# Patient Record
Sex: MALE | Race: WHITE | Employment: OTHER | URBAN - METROPOLITAN AREA
[De-identification: names, ages, dates, MRNs, and addresses within clinical notes are randomized per-mention and may not be internally consistent; named-entity substitution may affect disease eponyms.]

---

## 2017-03-21 ENCOUNTER — OFFICE VISIT (OUTPATIENT)
Dept: INTERNAL MEDICINE CLINIC | Age: 72
End: 2017-03-21

## 2017-03-21 VITALS
SYSTOLIC BLOOD PRESSURE: 144 MMHG | HEIGHT: 69 IN | BODY MASS INDEX: 28.58 KG/M2 | OXYGEN SATURATION: 98 % | DIASTOLIC BLOOD PRESSURE: 82 MMHG | HEART RATE: 63 BPM | WEIGHT: 193 LBS

## 2017-03-21 DIAGNOSIS — E07.9 THYROID DISORDER: ICD-10-CM

## 2017-03-21 DIAGNOSIS — E78.00 HYPERCHOLESTEROLEMIA: ICD-10-CM

## 2017-03-21 DIAGNOSIS — I10 ESSENTIAL HYPERTENSION: Primary | ICD-10-CM

## 2017-03-21 DIAGNOSIS — R73.9 HYPERGLYCEMIA: ICD-10-CM

## 2017-03-21 LAB
A/G RATIO: 2 (ref 1.1–2.2)
ALBUMIN SERPL-MCNC: 4.9 G/DL (ref 3.4–5)
ALP BLD-CCNC: 68 U/L (ref 40–129)
ALT SERPL-CCNC: 23 U/L (ref 10–40)
ANION GAP SERPL CALCULATED.3IONS-SCNC: 15 MMOL/L (ref 3–16)
AST SERPL-CCNC: 21 U/L (ref 15–37)
BILIRUB SERPL-MCNC: 0.4 MG/DL (ref 0–1)
BUN BLDV-MCNC: 18 MG/DL (ref 7–20)
CALCIUM SERPL-MCNC: 10 MG/DL (ref 8.3–10.6)
CHLORIDE BLD-SCNC: 101 MMOL/L (ref 99–110)
CHOLESTEROL, TOTAL: 165 MG/DL (ref 0–199)
CO2: 23 MMOL/L (ref 21–32)
CREAT SERPL-MCNC: 0.9 MG/DL (ref 0.8–1.3)
GFR AFRICAN AMERICAN: >60
GFR NON-AFRICAN AMERICAN: >60
GLOBULIN: 2.4 G/DL
GLUCOSE BLD-MCNC: 106 MG/DL (ref 70–99)
POTASSIUM SERPL-SCNC: 4.7 MMOL/L (ref 3.5–5.1)
SODIUM BLD-SCNC: 139 MMOL/L (ref 136–145)
TOTAL PROTEIN: 7.3 G/DL (ref 6.4–8.2)
TSH REFLEX: 3.46 UIU/ML (ref 0.27–4.2)

## 2017-03-21 PROCEDURE — 99213 OFFICE O/P EST LOW 20 MIN: CPT | Performed by: INTERNAL MEDICINE

## 2017-03-21 PROCEDURE — 3017F COLORECTAL CA SCREEN DOC REV: CPT | Performed by: INTERNAL MEDICINE

## 2017-03-21 PROCEDURE — G8420 CALC BMI NORM PARAMETERS: HCPCS | Performed by: INTERNAL MEDICINE

## 2017-03-21 PROCEDURE — 1123F ACP DISCUSS/DSCN MKR DOCD: CPT | Performed by: INTERNAL MEDICINE

## 2017-03-21 PROCEDURE — G8484 FLU IMMUNIZE NO ADMIN: HCPCS | Performed by: INTERNAL MEDICINE

## 2017-03-21 PROCEDURE — 4040F PNEUMOC VAC/ADMIN/RCVD: CPT | Performed by: INTERNAL MEDICINE

## 2017-03-21 PROCEDURE — G8427 DOCREV CUR MEDS BY ELIG CLIN: HCPCS | Performed by: INTERNAL MEDICINE

## 2017-03-21 PROCEDURE — 4004F PT TOBACCO SCREEN RCVD TLK: CPT | Performed by: INTERNAL MEDICINE

## 2017-03-21 ASSESSMENT — ENCOUNTER SYMPTOMS
ALLERGIC/IMMUNOLOGIC NEGATIVE: 1
EYES NEGATIVE: 1
RESPIRATORY NEGATIVE: 1

## 2017-03-22 LAB
ESTIMATED AVERAGE GLUCOSE: 125.5 MG/DL
HBA1C MFR BLD: 6 %

## 2017-04-17 RX ORDER — AMLODIPINE BESYLATE 10 MG/1
TABLET ORAL
Qty: 90 TABLET | Refills: 3 | Status: SHIPPED | OUTPATIENT
Start: 2017-04-17 | End: 2017-11-02 | Stop reason: SDUPTHER

## 2017-04-17 RX ORDER — SIMVASTATIN 20 MG
TABLET ORAL
Qty: 90 TABLET | Refills: 3 | Status: SHIPPED | OUTPATIENT
Start: 2017-04-17 | End: 2018-04-23 | Stop reason: SDUPTHER

## 2017-09-26 ENCOUNTER — OFFICE VISIT (OUTPATIENT)
Dept: INTERNAL MEDICINE CLINIC | Age: 72
End: 2017-09-26

## 2017-09-26 VITALS
SYSTOLIC BLOOD PRESSURE: 142 MMHG | WEIGHT: 193.6 LBS | HEIGHT: 69 IN | DIASTOLIC BLOOD PRESSURE: 72 MMHG | HEART RATE: 74 BPM | OXYGEN SATURATION: 98 % | BODY MASS INDEX: 28.68 KG/M2

## 2017-09-26 DIAGNOSIS — Z23 NEEDS FLU SHOT: ICD-10-CM

## 2017-09-26 DIAGNOSIS — E07.9 THYROID DISORDER: ICD-10-CM

## 2017-09-26 DIAGNOSIS — R73.9 HYPERGLYCEMIA: ICD-10-CM

## 2017-09-26 DIAGNOSIS — F32.A DEPRESSION, UNSPECIFIED DEPRESSION TYPE: ICD-10-CM

## 2017-09-26 DIAGNOSIS — E78.00 HYPERCHOLESTEROLEMIA: ICD-10-CM

## 2017-09-26 DIAGNOSIS — Z12.2 ENCOUNTER FOR SCREENING FOR LUNG CANCER: ICD-10-CM

## 2017-09-26 DIAGNOSIS — I10 ESSENTIAL HYPERTENSION: Primary | ICD-10-CM

## 2017-09-26 LAB
A/G RATIO: 2 (ref 1.1–2.2)
ALBUMIN SERPL-MCNC: 4.7 G/DL (ref 3.4–5)
ALP BLD-CCNC: 65 U/L (ref 40–129)
ALT SERPL-CCNC: 18 U/L (ref 10–40)
ANION GAP SERPL CALCULATED.3IONS-SCNC: 17 MMOL/L (ref 3–16)
AST SERPL-CCNC: 19 U/L (ref 15–37)
BILIRUB SERPL-MCNC: 0.4 MG/DL (ref 0–1)
BUN BLDV-MCNC: 16 MG/DL (ref 7–20)
CALCIUM SERPL-MCNC: 9.7 MG/DL (ref 8.3–10.6)
CHLORIDE BLD-SCNC: 99 MMOL/L (ref 99–110)
CHOLESTEROL, TOTAL: 163 MG/DL (ref 0–199)
CO2: 22 MMOL/L (ref 21–32)
CREAT SERPL-MCNC: 0.9 MG/DL (ref 0.8–1.3)
GFR AFRICAN AMERICAN: >60
GFR NON-AFRICAN AMERICAN: >60
GLOBULIN: 2.4 G/DL
GLUCOSE BLD-MCNC: 113 MG/DL (ref 70–99)
HDLC SERPL-MCNC: 44 MG/DL (ref 40–60)
LDL CHOLESTEROL CALCULATED: 83 MG/DL
POTASSIUM SERPL-SCNC: 4.9 MMOL/L (ref 3.5–5.1)
SODIUM BLD-SCNC: 138 MMOL/L (ref 136–145)
TOTAL PROTEIN: 7.1 G/DL (ref 6.4–8.2)
TRIGL SERPL-MCNC: 178 MG/DL (ref 0–150)
VLDLC SERPL CALC-MCNC: 36 MG/DL

## 2017-09-26 PROCEDURE — 4040F PNEUMOC VAC/ADMIN/RCVD: CPT | Performed by: INTERNAL MEDICINE

## 2017-09-26 PROCEDURE — 3017F COLORECTAL CA SCREEN DOC REV: CPT | Performed by: INTERNAL MEDICINE

## 2017-09-26 PROCEDURE — 99214 OFFICE O/P EST MOD 30 MIN: CPT | Performed by: INTERNAL MEDICINE

## 2017-09-26 PROCEDURE — 1123F ACP DISCUSS/DSCN MKR DOCD: CPT | Performed by: INTERNAL MEDICINE

## 2017-09-26 PROCEDURE — 4004F PT TOBACCO SCREEN RCVD TLK: CPT | Performed by: INTERNAL MEDICINE

## 2017-09-26 PROCEDURE — G8427 DOCREV CUR MEDS BY ELIG CLIN: HCPCS | Performed by: INTERNAL MEDICINE

## 2017-09-26 PROCEDURE — G8419 CALC BMI OUT NRM PARAM NOF/U: HCPCS | Performed by: INTERNAL MEDICINE

## 2017-09-26 PROCEDURE — 90662 IIV NO PRSV INCREASED AG IM: CPT | Performed by: INTERNAL MEDICINE

## 2017-09-26 PROCEDURE — G0008 ADMIN INFLUENZA VIRUS VAC: HCPCS | Performed by: INTERNAL MEDICINE

## 2017-09-26 RX ORDER — MIRTAZAPINE 45 MG/1
45 TABLET, FILM COATED ORAL NIGHTLY
COMMUNITY

## 2017-09-26 ASSESSMENT — ENCOUNTER SYMPTOMS
RESPIRATORY NEGATIVE: 1
EYES NEGATIVE: 1

## 2017-09-27 ENCOUNTER — TELEPHONE (OUTPATIENT)
Dept: INTERNAL MEDICINE CLINIC | Age: 72
End: 2017-09-27

## 2017-09-27 LAB
ESTIMATED AVERAGE GLUCOSE: 128.4 MG/DL
HBA1C MFR BLD: 6.1 %

## 2017-10-02 ENCOUNTER — TELEPHONE (OUTPATIENT)
Dept: INTERNAL MEDICINE CLINIC | Age: 72
End: 2017-10-02

## 2017-10-31 ENCOUNTER — TELEPHONE (OUTPATIENT)
Dept: INTERNAL MEDICINE CLINIC | Age: 72
End: 2017-10-31

## 2017-10-31 ENCOUNTER — HOSPITAL ENCOUNTER (OUTPATIENT)
Dept: CT IMAGING | Age: 72
Discharge: HOME | End: 2017-10-31
Admitting: INTERNAL MEDICINE

## 2017-10-31 DIAGNOSIS — Z12.2 ENCOUNTER FOR SCREENING FOR LUNG CANCER: ICD-10-CM

## 2017-10-31 DIAGNOSIS — Z12.2 ENCOUNTER FOR SCREENING FOR LUNG CANCER: Primary | ICD-10-CM

## 2017-10-31 DIAGNOSIS — Z12.2 ENCOUNTER FOR SCREENING FOR MALIGNANT NEOPLASM OF RESPIRATORY ORGANS: ICD-10-CM

## 2017-11-02 RX ORDER — LISINOPRIL 20 MG/1
TABLET ORAL
Qty: 90 TABLET | Refills: 3 | Status: SHIPPED | OUTPATIENT
Start: 2017-11-02 | End: 2018-09-20 | Stop reason: SDUPTHER

## 2017-11-02 RX ORDER — AMLODIPINE BESYLATE 10 MG/1
TABLET ORAL
Qty: 90 TABLET | Refills: 3 | Status: SHIPPED | OUTPATIENT
Start: 2017-11-02 | End: 2017-11-06 | Stop reason: SDUPTHER

## 2017-11-06 RX ORDER — AMLODIPINE BESYLATE 10 MG/1
TABLET ORAL
Qty: 90 TABLET | Refills: 3 | Status: SHIPPED | OUTPATIENT
Start: 2017-11-06 | End: 2017-11-20 | Stop reason: SDUPTHER

## 2017-11-09 ENCOUNTER — TELEPHONE (OUTPATIENT)
Dept: INTERNAL MEDICINE CLINIC | Age: 72
End: 2017-11-09

## 2017-11-09 DIAGNOSIS — I10 ESSENTIAL HYPERTENSION: Primary | ICD-10-CM

## 2017-11-09 RX ORDER — AMLODIPINE BESYLATE 10 MG/1
10 TABLET ORAL DAILY
Qty: 20 TABLET | Refills: 0 | Status: SHIPPED | OUTPATIENT
Start: 2017-11-09 | End: 2018-03-27 | Stop reason: CLARIF

## 2017-11-09 RX ORDER — AMLODIPINE BESYLATE 10 MG/1
TABLET ORAL
Qty: 90 TABLET | Refills: 3 | Status: CANCELLED | OUTPATIENT
Start: 2017-11-09

## 2017-11-20 RX ORDER — AMLODIPINE BESYLATE 10 MG/1
TABLET ORAL
Qty: 90 TABLET | Refills: 3 | Status: SHIPPED | OUTPATIENT
Start: 2017-11-20 | End: 2019-02-11 | Stop reason: SDUPTHER

## 2018-01-03 RX ORDER — LEVOTHYROXINE SODIUM 0.07 MG/1
TABLET ORAL
Qty: 90 TABLET | Refills: 3 | Status: SHIPPED | OUTPATIENT
Start: 2018-01-03 | End: 2018-11-15 | Stop reason: CLARIF

## 2018-03-27 ENCOUNTER — OFFICE VISIT (OUTPATIENT)
Dept: INTERNAL MEDICINE CLINIC | Age: 73
End: 2018-03-27

## 2018-03-27 VITALS
WEIGHT: 196 LBS | DIASTOLIC BLOOD PRESSURE: 82 MMHG | HEART RATE: 63 BPM | SYSTOLIC BLOOD PRESSURE: 138 MMHG | HEIGHT: 69 IN | BODY MASS INDEX: 29.03 KG/M2 | OXYGEN SATURATION: 97 %

## 2018-03-27 DIAGNOSIS — E78.00 HYPERCHOLESTEROLEMIA: ICD-10-CM

## 2018-03-27 DIAGNOSIS — R73.9 HYPERGLYCEMIA: ICD-10-CM

## 2018-03-27 DIAGNOSIS — J43.9 PULMONARY EMPHYSEMA, UNSPECIFIED EMPHYSEMA TYPE (HCC): ICD-10-CM

## 2018-03-27 DIAGNOSIS — E07.9 THYROID DISORDER: ICD-10-CM

## 2018-03-27 DIAGNOSIS — I10 ESSENTIAL HYPERTENSION: Primary | ICD-10-CM

## 2018-03-27 LAB
A/G RATIO: 2.1 (ref 1.1–2.2)
ALBUMIN SERPL-MCNC: 4.6 G/DL (ref 3.4–5)
ALP BLD-CCNC: 60 U/L (ref 40–129)
ALT SERPL-CCNC: 22 U/L (ref 10–40)
ANION GAP SERPL CALCULATED.3IONS-SCNC: 14 MMOL/L (ref 3–16)
AST SERPL-CCNC: 21 U/L (ref 15–37)
BILIRUB SERPL-MCNC: 0.3 MG/DL (ref 0–1)
BUN BLDV-MCNC: 21 MG/DL (ref 7–20)
CALCIUM SERPL-MCNC: 9.1 MG/DL (ref 8.3–10.6)
CHLORIDE BLD-SCNC: 102 MMOL/L (ref 99–110)
CHOLESTEROL, TOTAL: 150 MG/DL (ref 0–199)
CO2: 23 MMOL/L (ref 21–32)
CREAT SERPL-MCNC: 0.9 MG/DL (ref 0.8–1.3)
GFR AFRICAN AMERICAN: >60
GFR NON-AFRICAN AMERICAN: >60
GLOBULIN: 2.2 G/DL
GLUCOSE BLD-MCNC: 107 MG/DL (ref 70–99)
POTASSIUM SERPL-SCNC: 4.7 MMOL/L (ref 3.5–5.1)
SODIUM BLD-SCNC: 139 MMOL/L (ref 136–145)
TOTAL PROTEIN: 6.8 G/DL (ref 6.4–8.2)
TSH REFLEX: 3.43 UIU/ML (ref 0.27–4.2)

## 2018-03-27 PROCEDURE — 3017F COLORECTAL CA SCREEN DOC REV: CPT | Performed by: INTERNAL MEDICINE

## 2018-03-27 PROCEDURE — 99214 OFFICE O/P EST MOD 30 MIN: CPT | Performed by: INTERNAL MEDICINE

## 2018-03-27 PROCEDURE — G8482 FLU IMMUNIZE ORDER/ADMIN: HCPCS | Performed by: INTERNAL MEDICINE

## 2018-03-27 PROCEDURE — 4040F PNEUMOC VAC/ADMIN/RCVD: CPT | Performed by: INTERNAL MEDICINE

## 2018-03-27 PROCEDURE — 3023F SPIROM DOC REV: CPT | Performed by: INTERNAL MEDICINE

## 2018-03-27 PROCEDURE — 1123F ACP DISCUSS/DSCN MKR DOCD: CPT | Performed by: INTERNAL MEDICINE

## 2018-03-27 PROCEDURE — G8427 DOCREV CUR MEDS BY ELIG CLIN: HCPCS | Performed by: INTERNAL MEDICINE

## 2018-03-27 PROCEDURE — G8926 SPIRO NO PERF OR DOC: HCPCS | Performed by: INTERNAL MEDICINE

## 2018-03-27 PROCEDURE — 4004F PT TOBACCO SCREEN RCVD TLK: CPT | Performed by: INTERNAL MEDICINE

## 2018-03-27 PROCEDURE — G8419 CALC BMI OUT NRM PARAM NOF/U: HCPCS | Performed by: INTERNAL MEDICINE

## 2018-03-27 ASSESSMENT — PATIENT HEALTH QUESTIONNAIRE - PHQ9
SUM OF ALL RESPONSES TO PHQ QUESTIONS 1-9: 0
SUM OF ALL RESPONSES TO PHQ9 QUESTIONS 1 & 2: 0
2. FEELING DOWN, DEPRESSED OR HOPELESS: 0
1. LITTLE INTEREST OR PLEASURE IN DOING THINGS: 0

## 2018-03-27 ASSESSMENT — ENCOUNTER SYMPTOMS
RESPIRATORY NEGATIVE: 1
ALLERGIC/IMMUNOLOGIC NEGATIVE: 1
GASTROINTESTINAL NEGATIVE: 1
EYES NEGATIVE: 1

## 2018-03-28 ENCOUNTER — TELEPHONE (OUTPATIENT)
Dept: INTERNAL MEDICINE CLINIC | Age: 73
End: 2018-03-28

## 2018-03-28 LAB
ESTIMATED AVERAGE GLUCOSE: 125.5 MG/DL
HBA1C MFR BLD: 6 %

## 2018-04-23 RX ORDER — SIMVASTATIN 20 MG
TABLET ORAL
Qty: 90 TABLET | Refills: 3 | Status: SHIPPED | OUTPATIENT
Start: 2018-04-23 | End: 2018-04-26 | Stop reason: SDUPTHER

## 2018-04-26 RX ORDER — SIMVASTATIN 20 MG
TABLET ORAL
Qty: 90 TABLET | Refills: 3 | Status: SHIPPED | OUTPATIENT
Start: 2018-04-26 | End: 2019-04-29 | Stop reason: SDUPTHER

## 2018-06-15 ENCOUNTER — OFFICE VISIT (OUTPATIENT)
Dept: SURGERY | Age: 73
End: 2018-06-15

## 2018-06-15 VITALS
WEIGHT: 193 LBS | HEART RATE: 58 BPM | DIASTOLIC BLOOD PRESSURE: 85 MMHG | SYSTOLIC BLOOD PRESSURE: 155 MMHG | BODY MASS INDEX: 28.5 KG/M2

## 2018-06-15 DIAGNOSIS — L08.9 INFECTED SEBACEOUS CYST: Primary | ICD-10-CM

## 2018-06-15 DIAGNOSIS — L72.3 INFECTED SEBACEOUS CYST: Primary | ICD-10-CM

## 2018-06-15 PROCEDURE — 4004F PT TOBACCO SCREEN RCVD TLK: CPT | Performed by: SURGERY

## 2018-06-15 PROCEDURE — G8419 CALC BMI OUT NRM PARAM NOF/U: HCPCS | Performed by: SURGERY

## 2018-06-15 PROCEDURE — 99203 OFFICE O/P NEW LOW 30 MIN: CPT | Performed by: SURGERY

## 2018-06-15 PROCEDURE — 1123F ACP DISCUSS/DSCN MKR DOCD: CPT | Performed by: SURGERY

## 2018-06-15 PROCEDURE — 4040F PNEUMOC VAC/ADMIN/RCVD: CPT | Performed by: SURGERY

## 2018-06-15 PROCEDURE — 3017F COLORECTAL CA SCREEN DOC REV: CPT | Performed by: SURGERY

## 2018-06-15 PROCEDURE — G8427 DOCREV CUR MEDS BY ELIG CLIN: HCPCS | Performed by: SURGERY

## 2018-07-06 ENCOUNTER — PROCEDURE VISIT (OUTPATIENT)
Dept: SURGERY | Age: 73
End: 2018-07-06

## 2018-07-06 VITALS
HEART RATE: 61 BPM | BODY MASS INDEX: 28.5 KG/M2 | SYSTOLIC BLOOD PRESSURE: 150 MMHG | WEIGHT: 193 LBS | DIASTOLIC BLOOD PRESSURE: 87 MMHG

## 2018-07-06 DIAGNOSIS — L08.9 INFECTED SEBACEOUS CYST: Primary | ICD-10-CM

## 2018-07-06 DIAGNOSIS — L72.3 INFECTED SEBACEOUS CYST: Primary | ICD-10-CM

## 2018-07-06 PROCEDURE — 11402 EXC TR-EXT B9+MARG 1.1-2 CM: CPT | Performed by: SURGERY

## 2018-07-06 NOTE — PATIENT INSTRUCTIONS
OK to shower overtop of water tight dressing. OK to remove outer dressings in 48 hours. Leave steristrips in place. (These will fall of over time)   OK to take naproxen for pain. Call for any worsening redness, pain, or fevers greater than 101.5. No restrictions, activities as tolerated. OK to use ICE if needed. Will decrease inflammation. Follow up in 2 weeks to remove sutures.

## 2018-07-20 ENCOUNTER — OFFICE VISIT (OUTPATIENT)
Dept: SURGERY | Age: 73
End: 2018-07-20

## 2018-07-20 VITALS
HEART RATE: 66 BPM | BODY MASS INDEX: 28.94 KG/M2 | WEIGHT: 196 LBS | DIASTOLIC BLOOD PRESSURE: 87 MMHG | SYSTOLIC BLOOD PRESSURE: 161 MMHG

## 2018-07-20 DIAGNOSIS — L08.9 INFECTED SEBACEOUS CYST: Primary | ICD-10-CM

## 2018-07-20 DIAGNOSIS — L72.3 INFECTED SEBACEOUS CYST: Primary | ICD-10-CM

## 2018-07-20 PROCEDURE — 99024 POSTOP FOLLOW-UP VISIT: CPT | Performed by: SURGERY

## 2018-09-20 RX ORDER — LISINOPRIL 20 MG/1
TABLET ORAL
Qty: 90 TABLET | Refills: 3 | Status: SHIPPED | OUTPATIENT
Start: 2018-09-20 | End: 2018-09-24 | Stop reason: SDUPTHER

## 2018-11-05 ENCOUNTER — HOSPITAL ENCOUNTER (OUTPATIENT)
Dept: CT IMAGING | Age: 73
Discharge: HOME | End: 2018-11-05
Payer: MEDICARE

## 2018-11-05 DIAGNOSIS — F17.200 TOBACCO USE DISORDER: ICD-10-CM

## 2018-11-05 PROCEDURE — G0297 LDCT FOR LUNG CA SCREEN: HCPCS

## 2018-12-21 DIAGNOSIS — E07.9 THYROID DISORDER: ICD-10-CM

## 2018-12-21 DIAGNOSIS — R73.9 HYPERGLYCEMIA: ICD-10-CM

## 2018-12-21 LAB
T4 FREE: 1.2 NG/DL (ref 0.9–1.8)
TSH REFLEX: 5.83 UIU/ML (ref 0.27–4.2)

## 2018-12-22 LAB
ESTIMATED AVERAGE GLUCOSE: 125.5 MG/DL
HBA1C MFR BLD: 6 %

## 2019-11-12 ENCOUNTER — HOSPITAL ENCOUNTER (OUTPATIENT)
Dept: CT IMAGING | Age: 74
Discharge: HOME | End: 2019-11-12
Payer: MEDICARE

## 2019-11-12 DIAGNOSIS — E87.5 SERUM POTASSIUM ELEVATED: ICD-10-CM

## 2019-11-12 DIAGNOSIS — R77.0 ABNORMAL ALBUMIN: ICD-10-CM

## 2019-11-12 DIAGNOSIS — F17.200 TOBACCO USE DISORDER: ICD-10-CM

## 2019-11-12 LAB — POTASSIUM SERPL-SCNC: 4.4 MMOL/L (ref 3.5–5.1)

## 2019-11-12 PROCEDURE — G0297 LDCT FOR LUNG CA SCREEN: HCPCS

## 2019-11-13 LAB
ALBUMIN SERPL-MCNC: 3.9 G/DL (ref 3.1–4.9)
ALPHA-1-GLOBULIN: 0.3 G/DL (ref 0.2–0.4)
ALPHA-2-GLOBULIN: 1 G/DL (ref 0.4–1.1)
BETA GLOBULIN: 1.1 G/DL (ref 0.9–1.6)
GAMMA GLOBULIN: 1 G/DL (ref 0.6–1.8)
SPE/IFE INTERPRETATION: NORMAL
TOTAL PROTEIN: 7.2 G/DL (ref 6.4–8.2)

## 2019-11-15 LAB
KAPPA, FREE LIGHT CHAINS, SERUM: 26.29 MG/L (ref 3.3–19.4)
KAPPA/LAMBDA RATIO: 1.88 (ref 0.26–1.65)
KAPPA/LAMBDA TEST COMMENT: ABNORMAL
LAMBDA, FREE LIGHT CHAINS, SERUM: 14.02 MG/L (ref 5.71–26.3)

## 2019-12-10 ENCOUNTER — INITIAL CONSULT (OUTPATIENT)
Dept: SURGERY | Age: 74
End: 2019-12-10
Payer: MEDICARE

## 2019-12-10 VITALS
HEART RATE: 61 BPM | BODY MASS INDEX: 27.76 KG/M2 | WEIGHT: 188 LBS | DIASTOLIC BLOOD PRESSURE: 84 MMHG | SYSTOLIC BLOOD PRESSURE: 135 MMHG

## 2019-12-10 DIAGNOSIS — Z72.0 TOBACCO ABUSE: ICD-10-CM

## 2019-12-10 DIAGNOSIS — I71.40 ABDOMINAL AORTIC ANEURYSM (AAA) WITHOUT RUPTURE: Primary | ICD-10-CM

## 2019-12-10 PROCEDURE — G8427 DOCREV CUR MEDS BY ELIG CLIN: HCPCS | Performed by: SURGERY

## 2019-12-10 PROCEDURE — 99204 OFFICE O/P NEW MOD 45 MIN: CPT | Performed by: SURGERY

## 2019-12-10 PROCEDURE — G8482 FLU IMMUNIZE ORDER/ADMIN: HCPCS | Performed by: SURGERY

## 2019-12-10 PROCEDURE — G8417 CALC BMI ABV UP PARAM F/U: HCPCS | Performed by: SURGERY

## 2019-12-10 PROCEDURE — 3017F COLORECTAL CA SCREEN DOC REV: CPT | Performed by: SURGERY

## 2019-12-10 PROCEDURE — 4040F PNEUMOC VAC/ADMIN/RCVD: CPT | Performed by: SURGERY

## 2019-12-10 PROCEDURE — 1123F ACP DISCUSS/DSCN MKR DOCD: CPT | Performed by: SURGERY

## 2019-12-10 PROCEDURE — 4004F PT TOBACCO SCREEN RCVD TLK: CPT | Performed by: SURGERY

## 2019-12-10 ASSESSMENT — ENCOUNTER SYMPTOMS
GASTROINTESTINAL NEGATIVE: 1
ALLERGIC/IMMUNOLOGIC NEGATIVE: 1
RESPIRATORY NEGATIVE: 1
EYES NEGATIVE: 1

## 2020-01-14 ENCOUNTER — OFFICE VISIT (OUTPATIENT)
Dept: SURGERY | Age: 75
End: 2020-01-14
Payer: MEDICARE

## 2020-01-14 ENCOUNTER — PROCEDURE VISIT (OUTPATIENT)
Dept: SURGERY | Age: 75
End: 2020-01-14
Payer: MEDICARE

## 2020-01-14 VITALS — WEIGHT: 187 LBS | DIASTOLIC BLOOD PRESSURE: 80 MMHG | BODY MASS INDEX: 27.62 KG/M2 | SYSTOLIC BLOOD PRESSURE: 148 MMHG

## 2020-01-14 PROCEDURE — 99213 OFFICE O/P EST LOW 20 MIN: CPT | Performed by: SURGERY

## 2020-01-14 PROCEDURE — G8427 DOCREV CUR MEDS BY ELIG CLIN: HCPCS | Performed by: SURGERY

## 2020-01-14 PROCEDURE — 1123F ACP DISCUSS/DSCN MKR DOCD: CPT | Performed by: SURGERY

## 2020-01-14 PROCEDURE — 93978 VASCULAR STUDY: CPT | Performed by: SURGERY

## 2020-01-14 PROCEDURE — 4004F PT TOBACCO SCREEN RCVD TLK: CPT | Performed by: SURGERY

## 2020-01-14 PROCEDURE — G8482 FLU IMMUNIZE ORDER/ADMIN: HCPCS | Performed by: SURGERY

## 2020-01-14 PROCEDURE — G8417 CALC BMI ABV UP PARAM F/U: HCPCS | Performed by: SURGERY

## 2020-01-14 PROCEDURE — 3017F COLORECTAL CA SCREEN DOC REV: CPT | Performed by: SURGERY

## 2020-01-14 PROCEDURE — 4040F PNEUMOC VAC/ADMIN/RCVD: CPT | Performed by: SURGERY

## 2020-01-14 ASSESSMENT — ENCOUNTER SYMPTOMS
EYES NEGATIVE: 1
ALLERGIC/IMMUNOLOGIC NEGATIVE: 1
GASTROINTESTINAL NEGATIVE: 1
RESPIRATORY NEGATIVE: 1

## 2020-01-14 NOTE — PATIENT INSTRUCTIONS
Mr. Alma Delia Butler should schedule an appointment for 6 months for a repeated aortic iliac scan and office visit. At this time his aneurysm is only being monitored to make sure it isn't getting enlarged and in need of surgery.  He will also be getting a bilateral arterial scan of his lower legs

## 2020-01-14 NOTE — PROGRESS NOTES
Daily Progress Note   Lanette Blank MD      1/14/2020    Chief Complaint   Patient presents with    Other     Patient is here for 1 month Aortic Iliac graft f/u and office visit. HISTORY OF PRESENT ILLNESS:                The patient is a 76 y.o. male who presents with results of his aortic iliac graft.  .    Past Medical History:   Diagnosis Date    Anxiety     Depression     Hyperlipidemia     Hypertension     Hypothyroidism     Osteoarthritis        Past Surgical History:   Procedure Laterality Date    COLONOSCOPY  9/12/2014    Dr Margarito Park / Polyps    CYST REMOVAL  9/2011    LUMBAR LAMINECTOMY      OTHER SURGICAL HISTORY  1/29/2015    neck cyst excision       Social History     Socioeconomic History    Marital status:      Spouse name: Not on file    Number of children: Not on file    Years of education: Not on file    Highest education level: Not on file   Occupational History    Not on file   Social Needs    Financial resource strain: Not on file    Food insecurity:     Worry: Not on file     Inability: Not on file    Transportation needs:     Medical: Not on file     Non-medical: Not on file   Tobacco Use    Smoking status: Current Every Day Smoker     Packs/day: 0.50     Years: 53.00     Pack years: 26.50     Types: Cigarettes    Smokeless tobacco: Never Used    Tobacco comment: discused   using the water vapor    Substance and Sexual Activity    Alcohol use: Yes     Comment: occasionally    Drug use: No    Sexual activity: Yes     Partners: Female   Lifestyle    Physical activity:     Days per week: Not on file     Minutes per session: Not on file    Stress: Not on file   Relationships    Social connections:     Talks on phone: Not on file     Gets together: Not on file     Attends Jehovah's witness service: Not on file     Active member of club or organization: Not on file     Attends meetings of clubs or organizations: Not on file     Relationship status: Not on file   Renetta Intimate partner violence:     Fear of current or ex partner: Not on file     Emotionally abused: Not on file     Physically abused: Not on file     Forced sexual activity: Not on file   Other Topics Concern    Not on file   Social History Narrative    Not on file       Family History   Adopted: Yes         Current Outpatient Medications:     QUEtiapine (SEROQUEL) 25 MG tablet, Take 25 mg by mouth daily, Disp: , Rfl:     lisinopril (PRINIVIL;ZESTRIL) 30 MG tablet, Take 1 tablet by mouth daily, Disp: 90 tablet, Rfl: 3    levothyroxine (SYNTHROID) 88 MCG tablet, TAKE 1 TABLET EVERY DAY, Disp: 90 tablet, Rfl: 3    metoprolol tartrate (LOPRESSOR) 25 MG tablet, TAKE 1 TABLET DAILY, Disp: 90 tablet, Rfl: 3    simvastatin (ZOCOR) 20 MG tablet, TAKE 1 TABLET AT BEDTIME, Disp: 90 tablet, Rfl: 3    amLODIPine (NORVASC) 10 MG tablet, TAKE 1 TABLET EVERY DAY, Disp: 90 tablet, Rfl: 3    mirtazapine (REMERON) 45 MG tablet, Take 45 mg by mouth nightly, Disp: , Rfl:     LORazepam (ATIVAN) 1 MG tablet, Take 1 mg by mouth 2 times daily as needed for Anxiety. , Disp: , Rfl:     escitalopram (LEXAPRO) 10 MG tablet, Take 15 mg by mouth daily , Disp: , Rfl:     aspirin 81 MG tablet, Take 81 mg by mouth daily. , Disp: , Rfl:     Calcium Polycarbophil (FIBERCON PO), As directed , Disp: , Rfl:     Patient has no known allergies.     Vitals:    01/14/20 1121 01/14/20 1122   BP: (!) 152/76 (!) 148/80   Site: Right Upper Arm Left Upper Arm   Weight: 187 lb (84.8 kg)        Orders Only on 11/12/2019   Component Date Value Ref Range Status    Potassium 11/12/2019 4.4  3.5 - 5.1 mmol/L Final    Total Protein 11/12/2019 7.2  6.4 - 8.2 g/dL Final    Alb 11/12/2019 3.9  3.1 - 4.9 g/dL Final    Alpha-1-Globulin 11/12/2019 0.3  0.2 - 0.4 g/dL Final    Alpha-2-Globulin 11/12/2019 1.0  0.4 - 1.1 g/dL Final    Beta Globulin 11/12/2019 1.1  0.9 - 1.6 g/dL Final    Gamma Globulin 11/12/2019 1.0  0.6 - 1.8 g/dL Final    KAPPA, FREE LIGHT CHAINS, SERUM 11/12/2019 26.29* 3.30 - 19.40 mg/L Final    LAMBDA, FREE LIGHT CHAINS, SERUM 11/12/2019 14.02  5.71 - 26.30 mg/L Final    K/L RATIO 11/12/2019 1.88* 0.26 - 1.65 Final    KAPPA/LAMBDA TEST COMMENT 11/12/2019 see below   Final    Comment: Patients with impaired kidney function may have  falsely elevated free Kappa or free Lambda results.  SPE/BRITTANY Interpretation 11/12/2019 REVIEWED   Final    Comment: The serum protein electrophoresis is unremarkable. No monoclonal band  is seen on serum protein electrophoresis. According to the International Myeloma Working Group recommended  guideline,  SPEP alone may fail to detect monoclonal band in 12% of  patients. The addition of of serum light chains (sFL) and immunofixation  increases detection to >99%. CPT: 82562    Electronically signed out by  Roya Roman MD PhD         Review of Systems   Constitutional: Negative. HENT: Negative. Eyes: Negative. Respiratory: Negative. Cardiovascular: Negative. Gastrointestinal: Negative. Endocrine: Negative. Genitourinary: Negative. Musculoskeletal: Negative. Skin: Negative. Allergic/Immunologic: Negative. Neurological: Negative. Hematological: Negative. Psychiatric/Behavioral: Negative. All other systems reviewed and are negative. Physical Exam  Vitals signs and nursing note reviewed. Constitutional:       Appearance: Normal appearance. He is well-developed. HENT:      Head: Normocephalic and atraumatic. Right Ear: External ear normal.      Left Ear: External ear normal.   Eyes:      General: No scleral icterus. Conjunctiva/sclera: Conjunctivae normal.      Pupils: Pupils are equal, round, and reactive to light. Neck:      Musculoskeletal: Normal range of motion and neck supple. Thyroid: No thyromegaly. Vascular: No JVD. Trachea: No tracheal deviation. Cardiovascular:      Rate and Rhythm: Normal rate and regular rhythm.       Pulses:

## 2020-07-28 ENCOUNTER — OFFICE VISIT (OUTPATIENT)
Dept: SURGERY | Age: 75
End: 2020-07-28
Payer: MEDICARE

## 2020-07-28 ENCOUNTER — PROCEDURE VISIT (OUTPATIENT)
Dept: SURGERY | Age: 75
End: 2020-07-28
Payer: MEDICARE

## 2020-07-28 VITALS
WEIGHT: 192 LBS | SYSTOLIC BLOOD PRESSURE: 136 MMHG | HEIGHT: 69 IN | DIASTOLIC BLOOD PRESSURE: 80 MMHG | BODY MASS INDEX: 28.44 KG/M2

## 2020-07-28 PROCEDURE — G8417 CALC BMI ABV UP PARAM F/U: HCPCS | Performed by: SURGERY

## 2020-07-28 PROCEDURE — 99214 OFFICE O/P EST MOD 30 MIN: CPT | Performed by: SURGERY

## 2020-07-28 PROCEDURE — G8427 DOCREV CUR MEDS BY ELIG CLIN: HCPCS | Performed by: SURGERY

## 2020-07-28 PROCEDURE — 4004F PT TOBACCO SCREEN RCVD TLK: CPT | Performed by: SURGERY

## 2020-07-28 PROCEDURE — 93925 LOWER EXTREMITY STUDY: CPT | Performed by: SURGERY

## 2020-07-28 PROCEDURE — 3017F COLORECTAL CA SCREEN DOC REV: CPT | Performed by: SURGERY

## 2020-07-28 PROCEDURE — 4040F PNEUMOC VAC/ADMIN/RCVD: CPT | Performed by: SURGERY

## 2020-07-28 PROCEDURE — 93978 VASCULAR STUDY: CPT | Performed by: SURGERY

## 2020-07-28 PROCEDURE — 1123F ACP DISCUSS/DSCN MKR DOCD: CPT | Performed by: SURGERY

## 2020-07-28 ASSESSMENT — ENCOUNTER SYMPTOMS
ALLERGIC/IMMUNOLOGIC NEGATIVE: 1
GASTROINTESTINAL NEGATIVE: 1
RESPIRATORY NEGATIVE: 1
EYES NEGATIVE: 1

## 2020-07-28 NOTE — PATIENT INSTRUCTIONS
Mr. Raine Thorne should schedule an appointment for six months for an aorta iliac scan    Continue to try to quit smoking.

## 2020-07-28 NOTE — PROGRESS NOTES
occasionally    Drug use: No    Sexual activity: Yes     Partners: Female   Lifestyle    Physical activity     Days per week: Not on file     Minutes per session: Not on file    Stress: Not on file   Relationships    Social connections     Talks on phone: Not on file     Gets together: Not on file     Attends Jehovah's witness service: Not on file     Active member of club or organization: Not on file     Attends meetings of clubs or organizations: Not on file     Relationship status: Not on file    Intimate partner violence     Fear of current or ex partner: Not on file     Emotionally abused: Not on file     Physically abused: Not on file     Forced sexual activity: Not on file   Other Topics Concern    Not on file   Social History Narrative    Not on file       Family History   Adopted: Yes         Current Outpatient Medications:     amLODIPine (NORVASC) 10 MG tablet, TAKE 1 TABLET EVERY DAY, Disp: 90 tablet, Rfl: 3    simvastatin (ZOCOR) 20 MG tablet, TAKE 1 TABLET AT BEDTIME, Disp: 90 tablet, Rfl: 3    QUEtiapine (SEROQUEL) 25 MG tablet, Take 25 mg by mouth daily, Disp: , Rfl:     lisinopril (PRINIVIL;ZESTRIL) 30 MG tablet, Take 1 tablet by mouth daily, Disp: 90 tablet, Rfl: 3    levothyroxine (SYNTHROID) 88 MCG tablet, TAKE 1 TABLET EVERY DAY, Disp: 90 tablet, Rfl: 3    metoprolol tartrate (LOPRESSOR) 25 MG tablet, TAKE 1 TABLET DAILY, Disp: 90 tablet, Rfl: 3    mirtazapine (REMERON) 45 MG tablet, Take 45 mg by mouth nightly, Disp: , Rfl:     LORazepam (ATIVAN) 1 MG tablet, Take 1 mg by mouth 2 times daily as needed for Anxiety. , Disp: , Rfl:     escitalopram (LEXAPRO) 10 MG tablet, Take 15 mg by mouth daily , Disp: , Rfl:     aspirin 81 MG tablet, Take 81 mg by mouth daily. , Disp: , Rfl:     Calcium Polycarbophil (FIBERCON PO), As directed , Disp: , Rfl:     Patient has no known allergies.     Vitals:    07/28/20 1436   BP: 136/80   Site: Left Upper Arm   Position: Sitting   Cuff Size: Large Adult Weight: 192 lb (87.1 kg)   Height: 5' 9\" (1.753 m)       Orders Only on 11/12/2019   Component Date Value Ref Range Status    Potassium 11/12/2019 4.4  3.5 - 5.1 mmol/L Final    Total Protein 11/12/2019 7.2  6.4 - 8.2 g/dL Final    Alb 11/12/2019 3.9  3.1 - 4.9 g/dL Final    Alpha-1-Globulin 11/12/2019 0.3  0.2 - 0.4 g/dL Final    Alpha-2-Globulin 11/12/2019 1.0  0.4 - 1.1 g/dL Final    Beta Globulin 11/12/2019 1.1  0.9 - 1.6 g/dL Final    Gamma Globulin 11/12/2019 1.0  0.6 - 1.8 g/dL Final    KAPPA, FREE LIGHT CHAINS, SERUM 11/12/2019 26.29* 3.30 - 19.40 mg/L Final    LAMBDA, FREE LIGHT CHAINS, SERUM 11/12/2019 14.02  5.71 - 26.30 mg/L Final    K/L RATIO 11/12/2019 1.88* 0.26 - 1.65 Final    KAPPA/LAMBDA TEST COMMENT 11/12/2019 see below   Final    Comment: Patients with impaired kidney function may have  falsely elevated free Kappa or free Lambda results.  SPE/BRITTANY Interpretation 11/12/2019 REVIEWED   Final    Comment: The serum protein electrophoresis is unremarkable. No monoclonal band  is seen on serum protein electrophoresis. According to the International Myeloma Working Group recommended  guideline,  SPEP alone may fail to detect monoclonal band in 12% of  patients. The addition of of serum light chains (sFL) and immunofixation  increases detection to >99%. CPT: 42969    Electronically signed out by  Wilder Christie MD PhD         Review of Systems   Constitutional: Negative. HENT: Negative. Eyes: Negative. Respiratory: Negative. Cardiovascular: Negative. Gastrointestinal: Negative. Endocrine: Negative. Genitourinary: Negative. Musculoskeletal: Negative. Skin: Negative. Allergic/Immunologic: Negative. Neurological: Negative. Hematological: Negative. Psychiatric/Behavioral: Negative. All other systems reviewed and are negative. Physical Exam  Vitals signs and nursing note reviewed. Constitutional:       Appearance: Normal appearance.  He is well-developed. HENT:      Head: Normocephalic and atraumatic. Right Ear: External ear normal.      Left Ear: External ear normal.   Eyes:      General: No scleral icterus. Conjunctiva/sclera: Conjunctivae normal.      Pupils: Pupils are equal, round, and reactive to light. Neck:      Musculoskeletal: Normal range of motion and neck supple. Thyroid: No thyromegaly. Vascular: No JVD. Trachea: No tracheal deviation. Cardiovascular:      Rate and Rhythm: Normal rate and regular rhythm. Pulses:           Carotid pulses are 2+ on the right side and 2+ on the left side. Radial pulses are 2+ on the right side and 2+ on the left side. Femoral pulses are 2+ on the right side and 2+ on the left side. Popliteal pulses are 2+ on the right side and 2+ on the left side. Dorsalis pedis pulses are 2+ on the right side and 2+ on the left side. Posterior tibial pulses are 1+ on the right side and 2+ on the left side. Heart sounds: Normal heart sounds. No murmur. No gallop. Comments: 7/28/2020  No abdominal bruits    Pulmonary:      Effort: Pulmonary effort is normal.      Breath sounds: Normal breath sounds. No wheezing or rales. Chest:      Chest wall: No tenderness. Abdominal:      General: Bowel sounds are normal. There is no distension or abdominal bruit. Palpations: Abdomen is soft. There is no mass. Tenderness: There is no abdominal tenderness. There is no guarding or rebound. Hernia: No hernia is present. There is no hernia in the ventral area or left inguinal area. Genitourinary:     Comments: RECTAL EXAM  &  Guaiac NOT INDICATED  Musculoskeletal: Normal range of motion. General: No tenderness. Lymphadenopathy:      Head:      Right side of head: No submental, submandibular, preauricular or occipital adenopathy. Left side of head: No submental, submandibular, preauricular or occipital adenopathy.       Cervical: No cervical adenopathy. Right cervical: No superficial, deep or posterior cervical adenopathy. Left cervical: No superficial, deep or posterior cervical adenopathy. Upper Body:      Right upper body: No supraclavicular or pectoral adenopathy. Left upper body: No supraclavicular or pectoral adenopathy. Skin:     General: Skin is warm and dry. Neurological:      Mental Status: He is alert and oriented to person, place, and time. Cranial Nerves: No cranial nerve deficit. Sensory: No sensory deficit. Motor: No tremor, atrophy or abnormal muscle tone. Coordination: Coordination normal.      Gait: Gait normal.   Psychiatric:         Speech: Speech normal.         Behavior: Behavior normal.         Thought Content: Thought content normal.         Judgment: Judgment normal.           ASSESSMENT:    Problem List Items Addressed This Visit     None      Visit Diagnoses     Abdominal aortic aneurysm (AAA) without rupture (HCC)    -  Primary    Popliteal aneurysm (HCC)          His popliteal arteries are slightly enlarged but not big enough to follow on a regular basis perhaps in a year or year and a half we would rescan his aorta did get 3 mm bigger and just 6 months so we will keep a close eye on this    PLAN:    Mr. Priscila Lilly should schedule an appointment for six months for an aorta iliac scan    Continue to try to quit smoking. He has not made any progress on this front        I Lashay Cee MA am scribing for and in the presence of Michele Pena MD on this date of 07/28/20    I Batsheva Sargent MD personally performed the services described in this documentation as scribed by the Medical Assistant Jen Cee in my presence and it is both accurate and complete.         Electronically signed by Michele Pena MD on 7/28/2020 at 2:58 PM

## 2020-08-25 DIAGNOSIS — E78.00 HYPERCHOLESTEROLEMIA: ICD-10-CM

## 2020-08-25 DIAGNOSIS — R73.9 HYPERGLYCEMIA: ICD-10-CM

## 2020-08-25 DIAGNOSIS — E07.9 THYROID DISORDER: ICD-10-CM

## 2020-08-25 LAB
A/G RATIO: 1.6 (ref 1.1–2.2)
ALBUMIN SERPL-MCNC: 4.6 G/DL (ref 3.4–5)
ALP BLD-CCNC: 63 U/L (ref 40–129)
ALT SERPL-CCNC: 28 U/L (ref 10–40)
ANION GAP SERPL CALCULATED.3IONS-SCNC: 15 MMOL/L (ref 3–16)
AST SERPL-CCNC: 29 U/L (ref 15–37)
BILIRUB SERPL-MCNC: 0.5 MG/DL (ref 0–1)
BUN BLDV-MCNC: 25 MG/DL (ref 7–20)
CALCIUM SERPL-MCNC: 9.8 MG/DL (ref 8.3–10.6)
CHLORIDE BLD-SCNC: 103 MMOL/L (ref 99–110)
CHOLESTEROL, TOTAL: 154 MG/DL (ref 0–199)
CO2: 21 MMOL/L (ref 21–32)
CREAT SERPL-MCNC: 1 MG/DL (ref 0.8–1.3)
GFR AFRICAN AMERICAN: >60
GFR NON-AFRICAN AMERICAN: >60
GLOBULIN: 2.9 G/DL
GLUCOSE BLD-MCNC: 124 MG/DL (ref 70–99)
HDLC SERPL-MCNC: 38 MG/DL (ref 40–60)
LDL CHOLESTEROL CALCULATED: 80 MG/DL
POTASSIUM SERPL-SCNC: 4.7 MMOL/L (ref 3.5–5.1)
SODIUM BLD-SCNC: 139 MMOL/L (ref 136–145)
TOTAL PROTEIN: 7.5 G/DL (ref 6.4–8.2)
TRIGL SERPL-MCNC: 180 MG/DL (ref 0–150)
TSH REFLEX: 3.44 UIU/ML (ref 0.27–4.2)
VLDLC SERPL CALC-MCNC: 36 MG/DL

## 2020-08-26 LAB
ESTIMATED AVERAGE GLUCOSE: 131.2 MG/DL
HBA1C MFR BLD: 6.2 %

## 2021-03-04 DIAGNOSIS — R73.9 HYPERGLYCEMIA: ICD-10-CM

## 2021-03-04 DIAGNOSIS — D47.2 MONOCLONAL PARAPROTEINEMIA: ICD-10-CM

## 2021-03-04 DIAGNOSIS — E07.9 THYROID DISORDER: ICD-10-CM

## 2021-03-04 DIAGNOSIS — E78.00 HYPERCHOLESTEROLEMIA: ICD-10-CM

## 2021-03-05 ENCOUNTER — IMMUNIZATION (OUTPATIENT)
Dept: PRIMARY CARE CLINIC | Age: 76
End: 2021-03-05
Payer: MEDICARE

## 2021-03-05 LAB
A/G RATIO: 1.5 (ref 1.1–2.2)
ALBUMIN SERPL-MCNC: 4.5 G/DL (ref 3.4–5)
ALP BLD-CCNC: 57 U/L (ref 40–129)
ALT SERPL-CCNC: 16 U/L (ref 10–40)
ANION GAP SERPL CALCULATED.3IONS-SCNC: 12 MMOL/L (ref 3–16)
AST SERPL-CCNC: 24 U/L (ref 15–37)
BILIRUB SERPL-MCNC: 0.3 MG/DL (ref 0–1)
BUN BLDV-MCNC: 16 MG/DL (ref 7–20)
CALCIUM SERPL-MCNC: 10 MG/DL (ref 8.3–10.6)
CHLORIDE BLD-SCNC: 105 MMOL/L (ref 99–110)
CO2: 23 MMOL/L (ref 21–32)
CREAT SERPL-MCNC: 1 MG/DL (ref 0.8–1.3)
ESTIMATED AVERAGE GLUCOSE: 116.9 MG/DL
GFR AFRICAN AMERICAN: >60
GFR NON-AFRICAN AMERICAN: >60
GLOBULIN: 3 G/DL
GLUCOSE BLD-MCNC: 110 MG/DL (ref 70–99)
HBA1C MFR BLD: 5.7 %
KAPPA, FREE LIGHT CHAINS, SERUM: 21.53 MG/L (ref 3.3–19.4)
KAPPA/LAMBDA RATIO: 1.55 (ref 0.26–1.65)
KAPPA/LAMBDA TEST COMMENT: ABNORMAL
LAMBDA, FREE LIGHT CHAINS, SERUM: 13.9 MG/L (ref 5.71–26.3)
POTASSIUM SERPL-SCNC: 4.7 MMOL/L (ref 3.5–5.1)
SODIUM BLD-SCNC: 140 MMOL/L (ref 136–145)
TOTAL PROTEIN: 7.5 G/DL (ref 6.4–8.2)
TSH REFLEX: 2.83 UIU/ML (ref 0.27–4.2)

## 2021-03-05 PROCEDURE — 0001A PR IMM ADMN SARSCOV2 30MCG/0.3ML DIL RECON 1ST DOSE: CPT | Performed by: FAMILY MEDICINE

## 2021-03-05 PROCEDURE — 91300 COVID-19, PFIZER VACCINE 30MCG/0.3ML DOSE: CPT | Performed by: FAMILY MEDICINE

## 2021-03-10 ENCOUNTER — OFFICE VISIT (OUTPATIENT)
Dept: SURGERY | Age: 76
End: 2021-03-10
Payer: MEDICARE

## 2021-03-10 ENCOUNTER — PROCEDURE VISIT (OUTPATIENT)
Dept: SURGERY | Age: 76
End: 2021-03-10
Payer: MEDICARE

## 2021-03-10 VITALS
WEIGHT: 192.2 LBS | HEIGHT: 69 IN | SYSTOLIC BLOOD PRESSURE: 142 MMHG | DIASTOLIC BLOOD PRESSURE: 80 MMHG | BODY MASS INDEX: 28.47 KG/M2

## 2021-03-10 DIAGNOSIS — Z72.0 TOBACCO ABUSE: ICD-10-CM

## 2021-03-10 DIAGNOSIS — I71.40 ABDOMINAL AORTIC ANEURYSM (AAA) WITHOUT RUPTURE: Primary | ICD-10-CM

## 2021-03-10 DIAGNOSIS — I71.40 AAA (ABDOMINAL AORTIC ANEURYSM) WITHOUT RUPTURE: Primary | ICD-10-CM

## 2021-03-10 DIAGNOSIS — I10 HYPERTENSION, UNSPECIFIED TYPE: ICD-10-CM

## 2021-03-10 PROCEDURE — 4004F PT TOBACCO SCREEN RCVD TLK: CPT | Performed by: NURSE PRACTITIONER

## 2021-03-10 PROCEDURE — 99214 OFFICE O/P EST MOD 30 MIN: CPT | Performed by: NURSE PRACTITIONER

## 2021-03-10 PROCEDURE — G8427 DOCREV CUR MEDS BY ELIG CLIN: HCPCS | Performed by: NURSE PRACTITIONER

## 2021-03-10 PROCEDURE — 1123F ACP DISCUSS/DSCN MKR DOCD: CPT | Performed by: NURSE PRACTITIONER

## 2021-03-10 PROCEDURE — G8484 FLU IMMUNIZE NO ADMIN: HCPCS | Performed by: NURSE PRACTITIONER

## 2021-03-10 PROCEDURE — 4040F PNEUMOC VAC/ADMIN/RCVD: CPT | Performed by: NURSE PRACTITIONER

## 2021-03-10 PROCEDURE — 93978 VASCULAR STUDY: CPT | Performed by: STUDENT IN AN ORGANIZED HEALTH CARE EDUCATION/TRAINING PROGRAM

## 2021-03-10 PROCEDURE — 3017F COLORECTAL CA SCREEN DOC REV: CPT | Performed by: NURSE PRACTITIONER

## 2021-03-10 PROCEDURE — G8417 CALC BMI ABV UP PARAM F/U: HCPCS | Performed by: NURSE PRACTITIONER

## 2021-03-10 ASSESSMENT — ENCOUNTER SYMPTOMS
RESPIRATORY NEGATIVE: 1
ALLERGIC/IMMUNOLOGIC NEGATIVE: 1
EYES NEGATIVE: 1
GASTROINTESTINAL NEGATIVE: 1

## 2021-03-10 NOTE — PATIENT INSTRUCTIONS
Return in about 6 months (around 9/10/2021) for AAA scan and office visit. PATIENT EDUCATION focused on A&P of aneurysms and strong familial incidence. Patient was informed that smoking and high blood pressure can affect aneurysms. Smoking can thin vessels and high blood pressure results in too much pressure inside the vessel, which can potentially stretch it further. I explained that it is important to make family members aware of the influence of genetic factors in the development of an aneurysm.

## 2021-03-10 NOTE — LETTER
[address] [facility]  [id number]  [facility] [address]  Phone: [phone]  Fax: [phone]    DEDRA Lynn CNP        March 10, 2021      Alma Hendricks, [address] [place] [address]     Patient: Zeke Gupta    MR Number: <[id number]>    YOB: 1945    Date of Visit: 3/10/2021        Dear Alma Hendricks:    I saw your patient Princess Erwin, in the office for surveillance of an abdominal aortic aneurysm. His ultrasound showed an increase in the size of his aneurysm (4.71 cm). I have asked the patient to follow up in 6 months with a repeat ultrasound. I will keep you posted regarding this patient's progress.     Sincerely,      DEDRA Lynn CNP

## 2021-03-10 NOTE — PROGRESS NOTES
3/10/2021  Chief Complaint   Patient presents with    Circulatory Problem     6 month FU with AAA scan and office visit     Pain Assessment  The patient is currently not experiencing any pain at this time. HISTORY OF PRESENT ILLNESS:      Alma Burton is a 76 y.o. male who presents with a complaint of Aneurysm follow up. The Aneurysm is located in the abdominal aorta. Patient was last seen 7/8/20. Previous measurement was 4.58 x 4.54 cms per duplex scan at last evaluation. Current measurement is 4.71 x 4.51 cms per duplex scan. The patient has been asymptomatic since last visit. Contributing factors include use of tobacco and hypertension. Previous diagnostic tests have included CT scan and vascular scan. Recent diagnostic tests include: vascular scan. There has been no relevant prior surgery. He continues to smoke just under 1 PPD and has been smoking since he was 15years old. 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 Review of Systems   Constitutional: Negative. HENT: Negative. Eyes: Negative. Respiratory: Negative. Cardiovascular: Negative. Gastrointestinal: Negative. Endocrine: Negative. Genitourinary: Negative. Musculoskeletal: Negative. Skin: Negative. Allergic/Immunologic: Negative. Neurological: Negative. Hematological: Negative. Psychiatric/Behavioral: Negative. All other systems reviewed and are negative. No Known Allergies    Prior to Visit Medications    Medication Sig Taking? Authorizing Provider   traMADol (ULTRAM) 50 MG tablet Take 1 tablet by mouth every 8 hours as needed for Pain for up to 7 days. Intended supply: 7 days.  Take lowest dose possible to manage pain Yes Reshma Becker MD   lisinopril (PRINIVIL;ZESTRIL) 30 MG tablet TAKE ONE TABLET BY MOUTH DAILY Yes Reshma Becker MD   levothyroxine (SYNTHROID) 88 MCG tablet TAKE 1 Christobal Hai Yes Reshma Becker MD   amLODIPine (NORVASC) 10 MG tablet Take one a day Yes Josh Schmid MD   simvastatin (ZOCOR) 20 MG tablet TAKE 1 TABLET AT BEDTIME Yes Josh Schmid MD   metoprolol tartrate (LOPRESSOR) 25 MG tablet TAKE 1 TABLET DAILY Yes Josh Schmid MD   mirtazapine (REMERON) 45 MG tablet Take 45 mg by mouth nightly Yes Historical Provider, MD   LORazepam (ATIVAN) 1 MG tablet Take 1 mg by mouth 2 times daily as needed for Anxiety. Yes Historical Provider, MD   escitalopram (LEXAPRO) 10 MG tablet Take 15 mg by mouth daily  Yes Historical Provider, MD   aspirin 81 MG tablet Take 81 mg by mouth daily. Yes Historical Provider, MD   Calcium Polycarbophil (FIBERCON PO) As directed  Yes Historical Provider, MD       History reviewed. Physical Exam  Vitals signs and nursing note reviewed. Constitutional:       Appearance: Normal appearance. He is well-developed. HENT:      Head: Normocephalic and atraumatic. Right Ear: External ear normal.      Left Ear: External ear normal.   Eyes:      General: No scleral icterus. Conjunctiva/sclera: Conjunctivae normal.      Pupils: Pupils are equal, round, and reactive to light. Neck:      Musculoskeletal: Normal range of motion and neck supple. Thyroid: No thyromegaly. Vascular: No JVD. Trachea: No tracheal deviation. Cardiovascular:      Rate and Rhythm: Normal rate and regular rhythm. Pulses:           Carotid pulses are 2+ on the right side and 2+ on the left side. Radial pulses are 2+ on the right side and 2+ on the left side. Femoral pulses are 2+ on the right side and 2+ on the left side. Popliteal pulses are 2+ on the right side and 2+ on the left side. Dorsalis pedis pulses are 2+ on the right side and 2+ on the left side. Posterior tibial pulses are 1+ on the right side and 2+ on the left side. Heart sounds: Normal heart sounds. No murmur. No gallop.        Comments:   Popliteals are enlarged  Pulmonary:      Effort: Pulmonary effort is normal.      Breath sounds: Normal breath sounds. No wheezing or rales. Chest:      Chest wall: No tenderness. Abdominal:      General: Bowel sounds are normal. There is no distension or abdominal bruit. Palpations: Abdomen is soft. Pulsatile midline mass: AAA measures 4.71 cm x 4.51 cm per vascular scan today. Tenderness: There is no abdominal tenderness. There is no guarding or rebound. Musculoskeletal: Normal range of motion. General: No tenderness. Skin:     General: Skin is warm and dry. Neurological:      Mental Status: He is alert and oriented to person, place, and time. Cranial Nerves: No cranial nerve deficit. Sensory: No sensory deficit. Motor: No tremor, atrophy or abnormal muscle tone. Coordination: Coordination normal.      Gait: Gait normal.   Psychiatric:         Speech: Speech normal.         Behavior: Behavior normal.         Thought Content: Thought content normal.         Judgment: Judgment normal.         ASSESSMENT:     Diagnosis   1. Abdominal aortic aneurysm (AAA) without rupture ([address]    2. Tobacco abuse   The patient was instructed/counseled on smoking cessation. 3. Hypertension, unspecified type - stable, on lisinopril, amlodipine and metoprolol       PATIENT EDUCATION focused on A&P of aneurysms and strong familial incidence. Patient was informed that smoking and high blood pressure can affect aneurysms. Smoking can thin vessels and high blood pressure results in too much pressure inside the vessel, which can potentially stretch it further. I explained that it is important to make family members aware of the influence of genetic factors in the development of an aneurysm. PLAN:      Return in about 6 months (around 9/10/2021) for AAA scan and office visit.

## 2021-03-26 ENCOUNTER — IMMUNIZATION (OUTPATIENT)
Dept: PRIMARY CARE CLINIC | Age: 76
End: 2021-03-26
Payer: MEDICARE

## 2021-03-26 PROCEDURE — 0002A COVID-19, PFIZER VACCINE 30MCG/0.3ML DOSE: CPT | Performed by: FAMILY MEDICINE

## 2021-03-26 PROCEDURE — 91300 COVID-19, PFIZER VACCINE 30MCG/0.3ML DOSE: CPT | Performed by: FAMILY MEDICINE

## 2021-04-13 ENCOUNTER — OFFICE VISIT (OUTPATIENT)
Dept: SURGERY | Age: 76
End: 2021-04-13
Payer: MEDICARE

## 2021-04-13 VITALS
DIASTOLIC BLOOD PRESSURE: 89 MMHG | SYSTOLIC BLOOD PRESSURE: 154 MMHG | BODY MASS INDEX: 27.99 KG/M2 | RESPIRATION RATE: 16 BRPM | HEART RATE: 70 BPM | OXYGEN SATURATION: 99 % | HEIGHT: 69 IN | WEIGHT: 189 LBS

## 2021-04-13 DIAGNOSIS — K60.2 ANAL FISSURE: Primary | ICD-10-CM

## 2021-04-13 PROCEDURE — 1123F ACP DISCUSS/DSCN MKR DOCD: CPT | Performed by: SURGERY

## 2021-04-13 PROCEDURE — 4040F PNEUMOC VAC/ADMIN/RCVD: CPT | Performed by: SURGERY

## 2021-04-13 PROCEDURE — G8417 CALC BMI ABV UP PARAM F/U: HCPCS | Performed by: SURGERY

## 2021-04-13 PROCEDURE — 3017F COLORECTAL CA SCREEN DOC REV: CPT | Performed by: SURGERY

## 2021-04-13 PROCEDURE — 4004F PT TOBACCO SCREEN RCVD TLK: CPT | Performed by: SURGERY

## 2021-04-13 PROCEDURE — 99203 OFFICE O/P NEW LOW 30 MIN: CPT | Performed by: SURGERY

## 2021-04-13 PROCEDURE — G8427 DOCREV CUR MEDS BY ELIG CLIN: HCPCS | Performed by: SURGERY

## 2021-04-13 NOTE — PATIENT INSTRUCTIONS
have to limit fluids, talk with your doctor before you increase the amount of fluids you drink. ¨ Miralax or colace can be helpful to take as needed for constipation. Read and follow all instructions on the label. ¨ Use the toilet when only when you feel the urge. Do not strain when having a bowel movement. Do not sit on the toilet too long, play games on your cell phone, or read while on the commode. ¨ Get some exercise every day. Build up slowly to 30 to 60 minutes a day on 5 or more days of the week. · Support your feet with a small step stool when you sit on the toilet. This helps flex your hips and places your pelvis in a squatting position. · Most over-the-counter ointments and creams are not helpful, and can even cause damage to the skin  · Use baby wipes instead of toilet paper to clean after a bowel movement. These products do not irritate the anus. · Sit in a few inches of warm water (sitz bath) 3 times a day and after bowel movements. The warm water helps ease discomfort. Do not put soaps, salts, or shampoos in the water. Be sure to follow up in the office as instructed  · Typically you will have a follow up appointment scheduled in 4-6 weeks to reassess your symptoms. If not, please call the office to schedule this. · You may need to be seen sooner if you have fever, chills, excessive bleeding, increasing pain, inability to urinate, or changes in appetite. · Please call the office at [phone] with any questions or concerns  · If it is outside of normal hours and you have any concerns, please go to your nearest emergency room. What other options are available to treat fissures if I continue to have symptoms:  · Special ointments can be prescribed to help relax the muscle spasm. Typically, these need to be compounded (mixed) at a special pharmacy. A pea-sized amount should be used around (not inside) the anus twice daily.   · Botox injections of the sphincter can be performed, which will provide spasm relief for 1-2 months. Occasionally this needs to be repeated. This is typically performed as a same day surgery with anesthesia/sedation. · Internal sphincterotomy is a surgery in which a portion of the internal sphincter muscle is cut, to relieve the spasm. This procedure has a high success rate, but a higher risk of complications, including rarely a loss of bowel control (incontinence). This is typically performed as a same day surgery with anesthesia/sedation. · Fissurectomy and dermal advancement flap is a surgery in which healthy skin flaps are brought in from around the anus to cover the fissure and promote healing. This surgery is a bit more complex and sometimes require an overnight stay in the hospital.  · The decision of which treatment is right for you depends on the chronicity and severity of your symptoms, age, gender, previous anorectal and other surgeries, underlying bowel control issues, and any suspicion for cancer or other diseases. · Colonoscopy may be recommended at some point in your care if you have not had one recently or bleeding continues after the fissure heals    · Please talk to your colorectal surgeon about any health conditions or concerns you may have regarding your anal fissure treatment.

## 2021-04-13 NOTE — PROGRESS NOTES
[address] [address] [address]  Dept: [phone]  Dept Fax: [phone]  Loc: [phone]    Visit Date: 4/13/2021    Kim Gotti is a 76 y.o. male who presents today for: Rectal Pain and Rectal Bleeding      HPI:       Kim Gotti is a 76 y.o. male referred by Dr. Mauricio Woodard and Martín Baxter for anorectal discomfort. Patient has had 1.5 months of anorectal pain and discomfort. Symptoms occur most of the day, requiring narcotic louise nmedication. Bleeding: yes  Constipation: no  Patient has tried: rective ointment  Previous similar symptoms: no  Previous anorectal surgeries: no    Denies fever, chills, abd pain, nausea, emesis, weight loss. Patient's problem list, medications, past medical, surgical, family, and social histories were reviewed and updated in the chart as indicated today. Past Medical History:   Diagnosis Date    Anxiety     Depression     Hyperlipidemia     Hypertension     Hypothyroidism     Osteoarthritis        Past Surgical History:   Procedure Laterality Date    COLONOSCOPY  9/12/2014    Dr Martín Baxter / Polyps    CYST REMOVAL  9/2011    LUMBAR LAMINECTOMY      OTHER SURGICAL HISTORY  1/29/2015    neck cyst excision       Family History: Family history of colorectal cancer/polyps: no    Social History:   Social History     Tobacco Use    Smoking status: Current Every Day Smoker     Packs/day: 0.50     Years: 53.00     Pack years: 26.50     Types: Cigarettes    Smokeless tobacco: Never Used    Tobacco comment: discussed using the water vapor   Substance Use Topics    Alcohol use: Yes     Comment: occasionally      Tobacco cessation counseling provided as appropriate. REVIEW OF SYSTEMS:    Pertinent positives and negatives are mentioned in the HPI above. Otherwise, all other systems were reviewed and negative.       Objective:     Physical Exam   BP (!) 154/89 (Site: Left Upper Arm, Position: ointment, fiber supplementation, sitz baths, improving dietary and lifestyle choices  Risk of complications/morbidity: moderate    Patient has signs and symptoms consistent with anal fissure. Exam reveals posteior midline acute anal fissure. We will start with conservative management, including fiber supplementation, water, healthy fruits and vegetables, and medical management with prescription topical calcium channel blocker ointment. Discussed the use of the prescription ointment and that it will need to be obtained from a compounding pharmacy, which my office will arrange. If symptoms do not improve in 6-8 weeks, patient may require more invasive treatment options, such as Botox injection, partial sphincterotomy, or fissurectomy with anocutaneous advancement flap. We briefly discussed operative risks of these various options. He spent a lot of time talking about his pain issues including his previous back surgery. He is quite miserable. I told him that if he is not having a good response within the next 1 to 2 weeks, that he should call me and we can move forward with surgical planning sooner than 6 weeks if needed. Patient understands the need for full anorectal exam in the future after resolution of symptoms (or colonoscopy depending on other risk factors for colon cancer). I provided written information in the After Visit Summary AVS Regarding: Anal fissure    DISPOSITION:  Call me in 2 weeks for symptom reassessment    My findings will be relayed to consulting practitioner or PCP via Epic    Total encounter time:  35 min, including any number of the following: review of labs, imaging, provider notes, outside hospital records; performing examination/evaluation; counseling patient and family; ordering medications/tests; placing referrals and communication with referring physicians; coordination of care, and documentation in the EHR.     Note completed using dictation software, please excuse any errors.     Electronically signed by Dayami Alvarado MD on 4/13/2021 at 1:59 PM

## 2021-04-26 ENCOUNTER — TELEPHONE (OUTPATIENT)
Dept: SURGERY | Age: 76
End: 2021-04-26

## 2021-04-26 ENCOUNTER — PATIENT MESSAGE (OUTPATIENT)
Dept: SURGERY | Age: 76
End: 2021-04-26

## 2021-04-26 NOTE — TELEPHONE ENCOUNTER
Patient states that the ointment that was prescribed has not helped with his pain and would like to know what else he can do for his pain    Please contact the patient [phone]

## 2021-04-27 NOTE — TELEPHONE ENCOUNTER
From: Tha Servin  To: Bigg Mary MD  Sent: 4/26/2021 12:05 PM EDT  Subject: Visit Follow-Up Question    I wanted to be sure you have a clear view of the problem. The pain is becoming more acute. Unfortunately,  the medication did not work. This situation is playing havoc with my psychology as well! I need the next step as soon as possible. I have been dealing with this condition since the week of Feb, 22,2021.   Many Thanks

## 2021-04-27 NOTE — TELEPHONE ENCOUNTER
Patient returned call to office  Read him the message from Dr Todd Gross  He made an appt at [facility] this Thursday

## 2021-04-27 NOTE — TELEPHONE ENCOUNTER
Can use tylenol/motrin. Would not prescribe opioids, as they can cause constipation and worsen fissure. If he has tried the ointment for a few wks and no improvement, lets bring him back in to talk about surgery.

## 2021-04-29 ENCOUNTER — OFFICE VISIT (OUTPATIENT)
Dept: SURGERY | Age: 76
End: 2021-04-29
Payer: MEDICARE

## 2021-04-29 VITALS
RESPIRATION RATE: 20 BRPM | TEMPERATURE: 97.5 F | DIASTOLIC BLOOD PRESSURE: 87 MMHG | HEART RATE: 99 BPM | WEIGHT: 190.2 LBS | SYSTOLIC BLOOD PRESSURE: 136 MMHG | OXYGEN SATURATION: 97 % | BODY MASS INDEX: 28.09 KG/M2

## 2021-04-29 DIAGNOSIS — K60.2 ANAL FISSURE: Primary | ICD-10-CM

## 2021-04-29 PROCEDURE — 1123F ACP DISCUSS/DSCN MKR DOCD: CPT | Performed by: SURGERY

## 2021-04-29 PROCEDURE — G8417 CALC BMI ABV UP PARAM F/U: HCPCS | Performed by: SURGERY

## 2021-04-29 PROCEDURE — 4040F PNEUMOC VAC/ADMIN/RCVD: CPT | Performed by: SURGERY

## 2021-04-29 PROCEDURE — G8427 DOCREV CUR MEDS BY ELIG CLIN: HCPCS | Performed by: SURGERY

## 2021-04-29 PROCEDURE — 99213 OFFICE O/P EST LOW 20 MIN: CPT | Performed by: SURGERY

## 2021-04-29 PROCEDURE — 3017F COLORECTAL CA SCREEN DOC REV: CPT | Performed by: SURGERY

## 2021-04-29 PROCEDURE — 4004F PT TOBACCO SCREEN RCVD TLK: CPT | Performed by: SURGERY

## 2021-04-29 RX ORDER — NITROGLYCERIN 4 MG/G
OINTMENT RECTAL
Status: ON HOLD | COMMUNITY
Start: 2021-03-25 | End: 2021-05-13 | Stop reason: CLARIF

## 2021-04-29 NOTE — PROGRESS NOTES
Disp: , Rfl:   No Known Allergies  Past Surgical History:   Procedure Laterality Date    COLONOSCOPY  9/12/2014    Dr Clemons Prim / Polyps    CYST REMOVAL  9/2011    LUMBAR LAMINECTOMY      OTHER SURGICAL HISTORY  1/29/2015    neck cyst excision     Family History   Adopted: Yes       Social History:   Social History     Tobacco Use    Smoking status: Current Every Day Smoker     Packs/day: 0.50     Years: 53.00     Pack years: 26.50     Types: Cigarettes    Smokeless tobacco: Never Used    Tobacco comment: discussed using the water vapor   Substance Use Topics    Alcohol use: Yes     Comment: occasionally      Tobacco cessation counseling provided as appropriate. REVIEW OF SYSTEMS:    Pertinent positives and negatives are mentioned in the HPI. Otherwise, all other systems were reviewed and negative. Objective:     Physical Exam   /87   Pulse 99   Temp 97.5 °F (36.4 °C)   Resp 20   Wt 190 lb 3.2 oz (86.3 kg)   SpO2 97%   BMI 28.09 kg/m²   Constitutional: Appears well-developed and well-nourished. Grooming appropriate. No gross deformities. Body mass index is 28.09 kg/m². Eyes: No scleral icterus. Conjunctiva/lids normal. Vision intact grossly. Pupils equal/symmetric, reactive bilaterally. ENT: External ears/nose without defect, scars, or masses. Hearing grossly intact. No facial deformity. Lips normal, normal dentition. Neck: No masses. Trachea midline. No crepitus. Thyroid not enlarged. Cardiovascular: Normal rate. No peripheral edema. Abdominal aorta normal size to palpation. Pulmonary/Chest: Effort normal. No respiratory distress. No wheezes. No use of accessory muscles. Musculoskeletal: Normal range of motion of head/neck, without deformity, pain, or crepitus, with normal strength and tone. Normal gait. Nails without clubbing or cyanosis. Neurological: Alert and oriented to person, place, and time. No gross deficits. Sensation intact. Skin: Skin is dry. No rashes noted. No pallor.  No

## 2021-05-06 ENCOUNTER — TELEPHONE (OUTPATIENT)
Dept: SURGERY | Age: 76
End: 2021-05-06

## 2021-05-06 NOTE — TELEPHONE ENCOUNTER
Patient called in stating that he needs to schedule his surgery    Please contact the patient at [phone]

## 2021-05-07 ENCOUNTER — PREP FOR PROCEDURE (OUTPATIENT)
Dept: SURGERY | Age: 76
End: 2021-05-07

## 2021-05-07 RX ORDER — SODIUM CHLORIDE 0.9 % (FLUSH) 0.9 %
10 SYRINGE (ML) INJECTION EVERY 12 HOURS SCHEDULED
Status: CANCELLED | OUTPATIENT
Start: 2021-05-07

## 2021-05-07 RX ORDER — SODIUM CHLORIDE 0.9 % (FLUSH) 0.9 %
10 SYRINGE (ML) INJECTION PRN
Status: CANCELLED | OUTPATIENT
Start: 2021-05-07

## 2021-05-07 RX ORDER — SODIUM CHLORIDE 9 MG/ML
25 INJECTION, SOLUTION INTRAVENOUS PRN
Status: CANCELLED | OUTPATIENT
Start: 2021-05-07

## 2021-05-07 NOTE — TELEPHONE ENCOUNTER
Surgery scheduled for 5-13-21. Instructions given and emailed. Patient is frantic that he will be out of pain medication because he is in so much pain.

## 2021-05-07 NOTE — TELEPHONE ENCOUNTER
OK, we can Rx more if need be.  Alternatively we can try to schedule his surgery as an add on case Monday

## 2021-05-10 ENCOUNTER — OFFICE VISIT (OUTPATIENT)
Dept: PRIMARY CARE CLINIC | Age: 76
End: 2021-05-10
Payer: MEDICARE

## 2021-05-10 DIAGNOSIS — Z01.818 PRE-OP EXAMINATION: Primary | ICD-10-CM

## 2021-05-10 LAB — SARS-COV-2: NOT DETECTED

## 2021-05-10 PROCEDURE — 99211 OFF/OP EST MAY X REQ PHY/QHP: CPT | Performed by: NURSE PRACTITIONER

## 2021-05-11 NOTE — PROGRESS NOTES
[facility] [employer] / [facility] ([facility]) [address], [address]    Acknowledgment of Informed Consent for Surgical or Medical Procedure and Sedation  I agree to allow doctor(s) Silvana Mccall and his/her associates or assistants, including residents and/or other qualified medical practitioner to perform the following medical treatment or procedure and to administer or direct the administration of sedation as necessary:  Procedure(s):EXAM UNDER ANESTHESIA, FISSURECTOMY, BOTOX INJECTION ANAL FISSURE    My doctor has explained the following regarding the proposed procedure:   the explanation of the procedure   the benefits of the procedure   the potential problems that might occur during recuperation   the risks and side effects of the procedure which could include but are not limited to severe blood loss, infection, stroke or death   the benefits, risks and side effect of alternative procedures including the consequences of declining this procedure or any alternative procedures   the likelihood of achieving satisfactory results. I acknowledge no guarantee or assurance has been made to me regarding the results. I understand that during the course of this treatment/procedure, unforeseen conditions can occur which require an additional or different procedure. I agree to allow my physician or assistants to perform such extension of the original procedure as they may find necessary. I understand that sedation will often result in temporary impairment of memory and fine motor skills and that sedation can occasionally progress to a state of deep sedation or general anesthesia. I understand the risks of anesthesia for surgery include, but are not limited to, sore throat, hoarseness, injury to face, mouth, or teeth; nausea; headache; injury to blood vessels or nerves; death, brain damage, or paralysis.     I understand that if I have a Limitation of Treatment order in effect during my hospitalization, the order may or may not be in effect during this procedure. I give my doctor permission to give me blood or blood products. I understand that there are risks with receiving blood such as hepatitis, AIDS, fever, or allergic reaction. I acknowledge that the risks, benefits, and alternatives of this treatment have been explained to me and that no express or implied warranty has been given by the hospital, any blood bank, or any person or entity as to the blood or blood components transfused. At the discretion of my doctor, I agree to allow observers, equipment/product representatives and allow photographing, and/or televising of the procedure, provided my name or identity is maintained confidentially. I agree the hospital may dispose of or use for scientific or educational purposes any tissue, fluid, or body parts which may be removed.     ________________________________Date________Time______ am/pm  ([employer] [employer])  Patient or Signature of Closest Relative or Legal Guardian    ________________________________Date________Time______am/pm      Page 1 of  1  Witness

## 2021-05-11 NOTE — PROGRESS NOTES
[address] [facility] patients having surgery or anesthesia are required to be Covid tested. You will need to quarantine from the time you are tested until your surgery. PRIOR TO PROCEDURE DATE:        1. PLEASE FOLLOW ANY  GUIDELINES/ INSTRUCTIONS PRIOR TO YOUR PROCEDURE AS ADVISED BY YOUR SURGEON. 2. Arrange for someone to drive you home and be with you for the first 24 hours after discharge for your safety after your procedure for which you received sedation. Ensure it is someone we can share information with regarding your discharge. 3. You must contact your surgeon for instructions IF:   You are taking any blood thinners, aspirin, anti-inflammatory or vitamin E.   There is a change in your physical condition such as a cold, fever, rash, cuts, sores or any other infection, especially near your surgical site. 4. Do not drink alcohol the day before or day of your procedure. 5. A Pre-op History and Physical for surgery MUST be completed by your Physician or Urgent Care within 30 days of your procedure date. Please bring a copy with you on the day of your procedure and along with any other testing performed. THE DAY OF YOUR PROCEDURE:  1. Follow instructions for ARRIVAL TIME as DIRECTED BY YOUR SURGEON. 2. Enter the MAIN entrance from [address] [place] [address] and follow the signs to the free [employer] or [employer] parking (offered free of charge 6am-5pm). 3. Enter the Main Entrance of the hospital (do not enter from the lower level of the parking garage). Upon entrance, check in with the  at the main desk on your left. If no one is available at the desk, proceed into the [facility] Waiting Room and go through the door directly into the [facility]. There is a Check-in desk ACROSS from Room 5 (marked with a sign hanging from the ceiling).  The phone number for the surgery center is [phone]. 4. Please call [phone] option #2 option #2 if you have not been preregistered yet. On the day of your procedure bring your insurance card and photo ID. You will be registered at your bedside once brought back to your room. 5. DO NOT EAT ANYTHING eight hours prior to your arrival for surgery. May have 8 ounces of water 4 hours prior to your arrival for surgery. NOTE: ALL Gastric, Bariatric and Bowel surgery patients MUST follow their surgeon's instructions. 6. MEDICATIONS    Take the following medications with a SMALL sip of water: Metoprolol, Amlodipine, Synthroid   Use your usual dose of inhalers the morning of surgery. BRING your rescue inhaler with you to hospital.    Anesthesia does NOT want you to take insulin the morning of surgery. They will control your blood sugar while you are at the hospital. Please contact your ordering physician for instructions regarding your insulin the night before your procedure. If you have an insulin pump, please keep it set on basal rate. 7. Do not swallow water when brushing teeth. No gum, candy, mints or ice chips. Refrain from smoking or at least decrease the amount. 8. Dress in loose, comfortable clothing appropriate for redressing after your procedure. Do not wear jewelry (including body piercings), make-up (especially NO eye make-up), fingernail polish (NO toenail polish if foot/leg surgery), lotion, powders or metal hairclips. 9. Dentures, glasses, or contacts will need to be removed before your procedure. Bring cases for your glasses, contacts, dentures, or hearing aids to protect them while you are in surgery. 10. If you use a CPAP, please bring it with you on the day of your procedure. 11. We recommend that valuable personal  belongings such as cash, cell phones, e-tablets or jewelry, be left at home during your stay. The hospital will not be responsible for valuables that are not secured in the hospital safe. However, if your insurance requires a co-pay, you may want to bring a method of payment, i.e. Check or credit card, if you wish to pay your co-pay the day of surgery. 12. If you are to stay overnight, you may bring a bag with personal items. Please have any large items you may need brought in by your family after your arrival to your hospital room. 15. If you have a Living Will or Durable Power of , please bring a copy on the day of your procedure. 15. With your permission, one family member may accompany you while you are being prepared for surgery. Once you are ready, additional family members may join you. HOW WE KEEP YOU SAFE and WORK TO PREVENT SURGICAL SITE INFECTIONS:  1. Health care workers should always check your ID bracelet to verify your name and birth date. You will be asked many times to state your name, date of birth, and allergies. 2. Health care workers should always clean their hands with soap or alcohol gel before providing care to you. It is okay to ask anyone if they cleaned their hands before they touch you. 3. You will be actively involved in verifying the type of procedure you are having and ensuring the correct surgical site. This will be confirmed multiple times prior to your procedure. Do NOT yane your surgery site UNLESS instructed to by your surgeon. 4. Do not shave or wax for 72 hours prior to procedure near your operative site. Shaving with a razor can irritate your skin and make it easier to develop an infection. On the day of your procedure, any hair that needs to be removed near the surgical site will be clipped by a healthcare worker using a special clippers designed to avoid skin irritation. 5. When you are in the operating room, your surgical site will be cleansed with a special soap, and in most cases, you will be given an antibiotic before the surgery begins. What to expect AFTER YOUR PROCEDURE:  1.  Immediately following your

## 2021-05-12 ENCOUNTER — ANESTHESIA EVENT (OUTPATIENT)
Dept: OPERATING ROOM | Age: 76
End: 2021-05-12
Payer: MEDICARE

## 2021-05-13 ENCOUNTER — HOSPITAL ENCOUNTER (OUTPATIENT)
Age: 76
Setting detail: OUTPATIENT SURGERY
Discharge: HOME | End: 2021-05-13
Attending: SURGERY | Admitting: SURGERY
Payer: MEDICARE

## 2021-05-13 ENCOUNTER — ANESTHESIA (OUTPATIENT)
Dept: OPERATING ROOM | Age: 76
End: 2021-05-13
Payer: MEDICARE

## 2021-05-13 VITALS
TEMPERATURE: 97.1 F | OXYGEN SATURATION: 95 % | HEIGHT: 70 IN | DIASTOLIC BLOOD PRESSURE: 88 MMHG | HEART RATE: 52 BPM | SYSTOLIC BLOOD PRESSURE: 152 MMHG | WEIGHT: 185 LBS | RESPIRATION RATE: 14 BRPM | BODY MASS INDEX: 26.48 KG/M2

## 2021-05-13 VITALS
RESPIRATION RATE: 8 BRPM | SYSTOLIC BLOOD PRESSURE: 154 MMHG | OXYGEN SATURATION: 98 % | TEMPERATURE: 91.2 F | DIASTOLIC BLOOD PRESSURE: 83 MMHG

## 2021-05-13 DIAGNOSIS — K60.2 ANAL FISSURE: Primary | ICD-10-CM

## 2021-05-13 PROCEDURE — 7100000011 HC PHASE II RECOVERY - ADDTL 15 MIN: Performed by: SURGERY

## 2021-05-13 PROCEDURE — 7100000010 HC PHASE II RECOVERY - FIRST 15 MIN: Performed by: SURGERY

## 2021-05-13 PROCEDURE — 46505 CHEMODENERVATION ANAL MUSC: CPT | Performed by: SURGERY

## 2021-05-13 PROCEDURE — 3700000001 HC ADD 15 MINUTES (ANESTHESIA): Performed by: SURGERY

## 2021-05-13 PROCEDURE — 7100000001 HC PACU RECOVERY - ADDTL 15 MIN: Performed by: SURGERY

## 2021-05-13 PROCEDURE — 6360000002 HC RX W HCPCS: Performed by: NURSE ANESTHETIST, CERTIFIED REGISTERED

## 2021-05-13 PROCEDURE — 6360000002 HC RX W HCPCS: Performed by: SURGERY

## 2021-05-13 PROCEDURE — 7100000000 HC PACU RECOVERY - FIRST 15 MIN: Performed by: SURGERY

## 2021-05-13 PROCEDURE — 3600000003 HC SURGERY LEVEL 3 BASE: Performed by: SURGERY

## 2021-05-13 PROCEDURE — 2580000003 HC RX 258: Performed by: SURGERY

## 2021-05-13 PROCEDURE — 3700000000 HC ANESTHESIA ATTENDED CARE: Performed by: SURGERY

## 2021-05-13 PROCEDURE — 3600000013 HC SURGERY LEVEL 3 ADDTL 15MIN: Performed by: SURGERY

## 2021-05-13 PROCEDURE — 2580000003 HC RX 258: Performed by: ANESTHESIOLOGY

## 2021-05-13 PROCEDURE — 2500000003 HC RX 250 WO HCPCS: Performed by: NURSE ANESTHETIST, CERTIFIED REGISTERED

## 2021-05-13 PROCEDURE — 2709999900 HC NON-CHARGEABLE SUPPLY: Performed by: SURGERY

## 2021-05-13 RX ORDER — SODIUM CHLORIDE, SODIUM LACTATE, POTASSIUM CHLORIDE, CALCIUM CHLORIDE 600; 310; 30; 20 MG/100ML; MG/100ML; MG/100ML; MG/100ML
INJECTION, SOLUTION INTRAVENOUS CONTINUOUS
Status: DISCONTINUED | OUTPATIENT
Start: 2021-05-13 | End: 2021-05-13 | Stop reason: HOSPADM

## 2021-05-13 RX ORDER — SODIUM CHLORIDE 0.9 % (FLUSH) 0.9 %
10 SYRINGE (ML) INJECTION EVERY 12 HOURS SCHEDULED
Status: DISCONTINUED | OUTPATIENT
Start: 2021-05-13 | End: 2021-05-13 | Stop reason: HOSPADM

## 2021-05-13 RX ORDER — SODIUM CHLORIDE 9 MG/ML
25 INJECTION, SOLUTION INTRAVENOUS PRN
Status: DISCONTINUED | OUTPATIENT
Start: 2021-05-13 | End: 2021-05-13 | Stop reason: HOSPADM

## 2021-05-13 RX ORDER — FENTANYL CITRATE 50 UG/ML
INJECTION, SOLUTION INTRAMUSCULAR; INTRAVENOUS PRN
Status: DISCONTINUED | OUTPATIENT
Start: 2021-05-13 | End: 2021-05-13 | Stop reason: SDUPTHER

## 2021-05-13 RX ORDER — DIPHENHYDRAMINE HYDROCHLORIDE 50 MG/ML
12.5 INJECTION INTRAMUSCULAR; INTRAVENOUS
Status: DISCONTINUED | OUTPATIENT
Start: 2021-05-13 | End: 2021-05-13 | Stop reason: HOSPADM

## 2021-05-13 RX ORDER — ONDANSETRON 2 MG/ML
4 INJECTION INTRAMUSCULAR; INTRAVENOUS
Status: DISCONTINUED | OUTPATIENT
Start: 2021-05-13 | End: 2021-05-13 | Stop reason: HOSPADM

## 2021-05-13 RX ORDER — DEXAMETHASONE SODIUM PHOSPHATE 4 MG/ML
INJECTION, SOLUTION INTRA-ARTICULAR; INTRALESIONAL; INTRAMUSCULAR; INTRAVENOUS; SOFT TISSUE PRN
Status: DISCONTINUED | OUTPATIENT
Start: 2021-05-13 | End: 2021-05-13 | Stop reason: SDUPTHER

## 2021-05-13 RX ORDER — PROCHLORPERAZINE EDISYLATE 5 MG/ML
5 INJECTION INTRAMUSCULAR; INTRAVENOUS
Status: DISCONTINUED | OUTPATIENT
Start: 2021-05-13 | End: 2021-05-13 | Stop reason: HOSPADM

## 2021-05-13 RX ORDER — MEPERIDINE HYDROCHLORIDE 25 MG/ML
12.5 INJECTION INTRAMUSCULAR; INTRAVENOUS; SUBCUTANEOUS EVERY 5 MIN PRN
Status: DISCONTINUED | OUTPATIENT
Start: 2021-05-13 | End: 2021-05-13 | Stop reason: HOSPADM

## 2021-05-13 RX ORDER — OXYCODONE HYDROCHLORIDE 5 MG/1
5 TABLET ORAL EVERY 6 HOURS PRN
Qty: 28 TABLET | Refills: 0 | Status: SHIPPED | OUTPATIENT
Start: 2021-05-13 | End: 2021-05-20

## 2021-05-13 RX ORDER — ROCURONIUM BROMIDE 10 MG/ML
INJECTION, SOLUTION INTRAVENOUS PRN
Status: DISCONTINUED | OUTPATIENT
Start: 2021-05-13 | End: 2021-05-13 | Stop reason: SDUPTHER

## 2021-05-13 RX ORDER — SODIUM CHLORIDE 0.9 % (FLUSH) 0.9 %
10 SYRINGE (ML) INJECTION PRN
Status: DISCONTINUED | OUTPATIENT
Start: 2021-05-13 | End: 2021-05-13 | Stop reason: HOSPADM

## 2021-05-13 RX ORDER — HYDROCODONE BITARTRATE AND ACETAMINOPHEN 5; 325 MG/1; MG/1
1 TABLET ORAL
Status: DISCONTINUED | OUTPATIENT
Start: 2021-05-13 | End: 2021-05-13 | Stop reason: HOSPADM

## 2021-05-13 RX ORDER — PROPOFOL 10 MG/ML
INJECTION, EMULSION INTRAVENOUS PRN
Status: DISCONTINUED | OUTPATIENT
Start: 2021-05-13 | End: 2021-05-13 | Stop reason: SDUPTHER

## 2021-05-13 RX ORDER — HYDRALAZINE HYDROCHLORIDE 20 MG/ML
5 INJECTION INTRAMUSCULAR; INTRAVENOUS EVERY 10 MIN PRN
Status: DISCONTINUED | OUTPATIENT
Start: 2021-05-13 | End: 2021-05-13 | Stop reason: HOSPADM

## 2021-05-13 RX ORDER — MIDAZOLAM HYDROCHLORIDE 1 MG/ML
INJECTION INTRAMUSCULAR; INTRAVENOUS PRN
Status: DISCONTINUED | OUTPATIENT
Start: 2021-05-13 | End: 2021-05-13 | Stop reason: SDUPTHER

## 2021-05-13 RX ORDER — 0.9 % SODIUM CHLORIDE 0.9 %
500 INTRAVENOUS SOLUTION INTRAVENOUS
Status: DISCONTINUED | OUTPATIENT
Start: 2021-05-13 | End: 2021-05-13 | Stop reason: HOSPADM

## 2021-05-13 RX ORDER — LIDOCAINE HCL/PF 100 MG/5ML
SYRINGE (ML) INJECTION PRN
Status: DISCONTINUED | OUTPATIENT
Start: 2021-05-13 | End: 2021-05-13 | Stop reason: SDUPTHER

## 2021-05-13 RX ORDER — MAGNESIUM HYDROXIDE 1200 MG/15ML
LIQUID ORAL CONTINUOUS PRN
Status: COMPLETED | OUTPATIENT
Start: 2021-05-13 | End: 2021-05-13

## 2021-05-13 RX ORDER — SUCCINYLCHOLINE/SOD CL,ISO/PF 200MG/10ML
SYRINGE (ML) INTRAVENOUS PRN
Status: DISCONTINUED | OUTPATIENT
Start: 2021-05-13 | End: 2021-05-13 | Stop reason: SDUPTHER

## 2021-05-13 RX ORDER — ONDANSETRON 2 MG/ML
INJECTION INTRAMUSCULAR; INTRAVENOUS PRN
Status: DISCONTINUED | OUTPATIENT
Start: 2021-05-13 | End: 2021-05-13 | Stop reason: SDUPTHER

## 2021-05-13 RX ORDER — DOCUSATE SODIUM 100 MG/1
100 CAPSULE, LIQUID FILLED ORAL 2 TIMES DAILY PRN
Qty: 20 CAPSULE | Refills: 0 | Status: SHIPPED | OUTPATIENT
Start: 2021-05-13 | End: 2021-05-23

## 2021-05-13 RX ADMIN — ONDANSETRON 4 MG: 2 INJECTION INTRAMUSCULAR; INTRAVENOUS at 11:47

## 2021-05-13 RX ADMIN — MIDAZOLAM HYDROCHLORIDE 2 MG: 2 INJECTION, SOLUTION INTRAMUSCULAR; INTRAVENOUS at 11:34

## 2021-05-13 RX ADMIN — PROPOFOL 170 MG: 10 INJECTION, EMULSION INTRAVENOUS at 11:37

## 2021-05-13 RX ADMIN — FENTANYL CITRATE 50 MCG: 50 INJECTION, SOLUTION INTRAMUSCULAR; INTRAVENOUS at 11:36

## 2021-05-13 RX ADMIN — SODIUM CHLORIDE, POTASSIUM CHLORIDE, SODIUM LACTATE AND CALCIUM CHLORIDE: 600; 310; 30; 20 INJECTION, SOLUTION INTRAVENOUS at 09:50

## 2021-05-13 RX ADMIN — ROCURONIUM BROMIDE 5 MG: 10 INJECTION INTRAVENOUS at 11:37

## 2021-05-13 RX ADMIN — DEXAMETHASONE SODIUM PHOSPHATE 4 MG: 4 INJECTION, SOLUTION INTRAMUSCULAR; INTRAVENOUS at 11:47

## 2021-05-13 RX ADMIN — Medication 140 MG: at 11:37

## 2021-05-13 RX ADMIN — Medication 50 MG: at 11:37

## 2021-05-13 ASSESSMENT — PAIN - FUNCTIONAL ASSESSMENT: PAIN_FUNCTIONAL_ASSESSMENT: 0-10

## 2021-05-13 ASSESSMENT — PAIN SCALES - GENERAL: PAINLEVEL_OUTOF10: 0

## 2021-05-13 ASSESSMENT — PULMONARY FUNCTION TESTS
PIF_VALUE: 2
PIF_VALUE: 0
PIF_VALUE: 21
PIF_VALUE: 0
PIF_VALUE: 2
PIF_VALUE: 22
PIF_VALUE: 0
PIF_VALUE: 17
PIF_VALUE: 17
PIF_VALUE: 19
PIF_VALUE: 20
PIF_VALUE: 7
PIF_VALUE: 4
PIF_VALUE: 4
PIF_VALUE: 22
PIF_VALUE: 23
PIF_VALUE: 18
PIF_VALUE: 4
PIF_VALUE: 20
PIF_VALUE: 24
PIF_VALUE: 19
PIF_VALUE: 23

## 2021-05-13 ASSESSMENT — PAIN DESCRIPTION - DESCRIPTORS: DESCRIPTORS: DISCOMFORT

## 2021-05-13 ASSESSMENT — COPD QUESTIONNAIRES: CAT_SEVERITY: MODERATE

## 2021-05-13 ASSESSMENT — LIFESTYLE VARIABLES: SMOKING_STATUS: 1

## 2021-05-13 NOTE — PROGRESS NOTES
Patient received from the OR to PACU #6 post EXAM UNDER ANESTHESIA, BOTOX INJECTION ANAL FISSURE of Dr. Catherine Lee. Placed on PACU monitoring equipment. Report given per CRNA and Dr. Eduin Bassett. Per report, patient was stable during the procedure. On arrival, patient is arouseable with frequent NPC and no complaints of pain.

## 2021-05-13 NOTE — ANESTHESIA PRE PROCEDURE
Department of Anesthesiology  Preprocedure Note       Name:  Nimesh Chamberlain   Age:  76 y.o.  :  1945                                          MRN:  [id number]         Date:  2021      Surgeon: Ayanna Guillory):  Graciela Bautista MD    Procedure: Procedure(s):  EXAM UNDER ANESTHESIA, FISSURECTOMY, BOTOX INJECTION ANAL FISSURE    Medications prior to admission:   Prior to Admission medications    Medication Sig Start Date End Date Taking? Authorizing Provider   acetaminophen-codeine (TYLENOL/CODEINE #3) 300-30 MG per tablet Take 1 tablet by mouth every 6 hours as needed for Pain for up to 7 days. 5/10/21 5/17/21 Yes Nancy Perez MD   levothyroxine (SYNTHROID) 88 MCG tablet TAKE 1 TABLET EVERY DAY 21  Yes Nancy Perez MD   amLODIPine (NORVASC) 10 MG tablet Take one a day 21  Yes Nancy Perez MD   lisinopril (PRINIVIL;ZESTRIL) 30 MG tablet TAKE ONE TABLET BY MOUTH DAILY 21  Yes Nancy Perez MD   simvastatin (ZOCOR) 20 MG tablet TAKE 1 TABLET AT BEDTIME 21  Yes Nancy Perez MD   metoprolol tartrate (LOPRESSOR) 25 MG tablet TAKE 1 TABLET DAILY 10/14/19  Yes Nancy Perez MD   mirtazapine (REMERON) 45 MG tablet Take 45 mg by mouth nightly   Yes Historical Provider, MD   LORazepam (ATIVAN) 1 MG tablet Take 1 mg by mouth 2 times daily as needed for Anxiety. Yes Historical Provider, MD   escitalopram (LEXAPRO) 10 MG tablet Take 15 mg by mouth daily    Yes Historical Provider, MD   aspirin 81 MG tablet Take 81 mg by mouth daily.      Yes Historical Provider, MD   Calcium Polycarbophil (FIBERCON PO) As directed    Yes Historical Provider, MD       Current medications:    Current Facility-Administered Medications   Medication Dose Route Frequency Provider Last Rate Last Admin    lactated ringers infusion   Intravenous Continuous Domenico Rubio  mL/hr at 21 0950 New Bag at 21 0950    0.9 % sodium chloride infusion  25 mL Intravenous PRN Susanna Topete MD        sodium chloride flush 0.9 % injection 10 mL  10 mL Intravenous 2 times per day Susanna Topete MD        sodium chloride flush 0.9 % injection 10 mL  10 mL Intravenous PRN Susanna Topete MD        meperidine (DEMEROL) injection 12.5 mg  12.5 mg Intravenous Q5 Min PRN Riki Feather, DO        HYDROmorphone (DILAUDID) injection 0.25 mg  0.25 mg Intravenous Q5 Min PRN Riki Feather, DO        HYDROmorphone (DILAUDID) injection 0.5 mg  0.5 mg Intravenous Q5 Min PRN Riki Feather, DO        HYDROcodone-acetaminophen [facility]) 5-325 MG per tablet 1 tablet  1 tablet Oral Once PRN Riki Feather, DO        ondansetron [facility]) injection 4 mg  4 mg Intravenous Once PRN Riki Feather, DO        prochlorperazine (COMPAZINE) injection 5 mg  5 mg Intravenous Once PRN Riki Feather, DO        0.9 % sodium chloride bolus  500 mL Intravenous Once PRN Riki Feather, DO        diphenhydrAMINE (BENADRYL) injection 12.5 mg  12.5 mg Intravenous Once PRN Riki Feather, DO        hydrALAZINE (APRESOLINE) injection 5 mg  5 mg Intravenous Q10 Min PRN Riki Feather, DO           Allergies:  No Known Allergies    Problem List:    Patient Active Problem List   Diagnosis Code    HTN (hypertension) I10    Palpitations R00.2    Thyroid disorder E07.9    Hypertrophy of prostate without urinary obstruction and other lower urinary tract symptoms (LUTS) N40.0    Hypercholesterolemia E78.00    Depression F32.9    Renal cyst N28.1    Epidermal cyst of neck L72.0    BPH w/o urinary obs/LUTS N40.0    Pulmonary emphysema (HCC) J43.9    Hyperglycemia R73.9    Tobacco abuse Z72.0    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    Abdominal aortic aneurysm (AAA) without rupture (HCC) I71.4       Past Medical History:        Diagnosis Date    Anxiety     Depression     Hyperlipidemia     Hypertension     Hypothyroidism     Osteoarthritis        Past Surgical History:        Procedure Laterality Date    COLONOSCOPY  9/12/2014    Dr Dariel Corral / Polyps    CYST REMOVAL  9/2011    LUMBAR LAMINECTOMY      OTHER SURGICAL HISTORY  1/29/2015    neck cyst excision       Social History:    Social History     Tobacco Use    Smoking status: Current Every Day Smoker     Packs/day: 0.50     Years: 53.00     Pack years: 26.50     Types: Cigarettes    Smokeless tobacco: Never Used    Tobacco comment: discussed using the water vapor   Substance Use Topics    Alcohol use: Yes     Comment: occasionally                                Ready to quit: Not Answered  Counseling given: Not Answered  Comment: discussed using the water vapor      Vital Signs (Current):   Vitals:    05/11/21 0907 05/13/21 0921   BP:  (!) 158/96   Pulse:  79   Resp:  14   Temp:  98.7 °F (37.1 °C)   TempSrc:  Temporal   SpO2:  95%   Weight: 185 lb (83.9 kg) 185 lb (83.9 kg)   Height: 5' 10\" (1.778 m) 5' 10\" (1.778 m)                                              BP Readings from Last 3 Encounters:   05/13/21 (!) 158/96   04/29/21 136/87   04/13/21 (!) 154/89       NPO Status: Time of last liquid consumption: 0730                        Time of last solid consumption: 1900                        Date of last liquid consumption: 05/13/21                        Date of last solid food consumption: 05/12/21    BMI:   Wt Readings from Last 3 Encounters:   05/13/21 185 lb (83.9 kg)   04/29/21 190 lb 3.2 oz (86.3 kg)   04/13/21 189 lb (85.7 kg)     Body mass index is 26.54 kg/m².     CBC: No results found for: WBC, RBC, HGB, HCT, MCV, RDW, PLT    CMP:   Lab Results   Component Value Date     03/04/2021    K 4.7 03/04/2021     03/04/2021    CO2 23 03/04/2021    BUN 16 03/04/2021    CREATININE 1.0 03/04/2021    GFRAA >60 03/04/2021    GFRAA >60 04/26/2013    AGRATIO 1.5 03/04/2021    LABGLOM >60 03/04/2021    LABGLOM 84.4 08/01/2011    GLUCOSE 110 03/04/2021    GLUCOSE 113 08/01/2011    PROT 7.5 03/04/2021    PROT 7.2 10/16/2012    CALCIUM 10.0 03/04/2021    BILITOT 0.3 03/04/2021    ALKPHOS 57 03/04/2021    AST 24 03/04/2021    ALT 16 03/04/2021       POC Tests: No results for input(s): POCGLU, POCNA, POCK, POCCL, POCBUN, POCHEMO, POCHCT in the last 72 hours. Coags: No results found for: PROTIME, INR, APTT    HCG (If Applicable): No results found for: PREGTESTUR, PREGSERUM, HCG, HCGQUANT     ABGs: No results found for: PHART, PO2ART, NNX0ZFL, QVK4GTL, BEART, B6JUEYWS     Type & Screen (If Applicable):  No results found for: LABABO, LABRH    Drug/Infectious Status (If Applicable):  No results found for: HIV, HEPCAB    COVID-19 Screening (If Applicable):   Lab Results   Component Value Date    COVID19 Not Detected 05/10/2021           Anesthesia Evaluation  Patient summary reviewed and Nursing notes reviewed no history of anesthetic complications:   Airway: Mallampati: II  TM distance: >3 FB   Neck ROM: full  Mouth opening: > = 3 FB Dental: normal exam         Pulmonary: breath sounds clear to auscultation  (+) COPD: moderate,  current smoker (1 ppd )          Patient smoked on day of surgery. Cardiovascular:  Exercise tolerance: good (>4 METS),   (+) hypertension: moderate,     (-) past MI    NYHA Classification: II    Rhythm: regular  Rate: normal           Beta Blocker:  Dose within 24 Hrs         Neuro/Psych:               GI/Hepatic/Renal:        (-) GERD      ROS comment: Anal Fissure . Endo/Other:    (+) hypothyroidism::., .                 Abdominal:           Vascular:                                        Anesthesia Plan      general     ASA 2       Induction: intravenous. MIPS: Postoperative opioids intended and Prophylactic antiemetics administered. Anesthetic plan and risks discussed with patient and spouse. Plan discussed with CRNA.     Attending anesthesiologist reviewed and agrees with Pre Eval content              Tamara Alexis DO   5/13/2021

## 2021-05-13 NOTE — OP NOTE
OPERATIVE NOTE     Chris Munoz  1945  [id number]    DATE OF PROCEDURE: 05/13/21     PREOPERATIVE DIAGNOSES: Anal fissure      POSTOPERATIVE DIAGNOSES: Healing mild anal fissure     PROCEDURE: Botox injection anal fissure      SURGEON: MD Elsa Perkins, PGY-5, resident     ANESTHESIA: GET. COMPLICATIONS: None. EBL: min    SPECIMEN: none     FINDINGS: healing anal fissure     INDICATIONS: The patient is a 80-year-old male who has had symptoms of anal fissure, waxing and waning. Patient was recommended to undergo EUA, botox injection anal fissure. The risks, benefits, and alternatives of procedure were explained to the patient including risks of bleeding, infection, pelvic sepsis, unexpected findings, missed lesions, urinary retention, anal stenosis, and potential  sphincter damage and dysfunction, either temporary or permanent. Patient understood all of these risks and agreed to  proceed. Typical postoperative course was discussed, including pain and likely need for up to 3 weeks of recovery. PROCEDURE DETAILS:   The patient was brought to the operating theater. The patient was placed in the prone emiliano-knife position after induction of anesthesia. Buttocks were taped apart carefully using tape. The patient's perianal region was prepped and draped in usual sterile fashion using Betadine prep solution. Time-out was performed confirming the patient's identity as well as the operative site. Antibiotics were not indicated. SCDs were on and functioning. All safety points were followed. Digital rectal exam and anoscopy was performed in all 4 quadrants using lighted anal retractor, noting: normal mucosa, with slightly tight internal anal sphincter. 100 units botox was mixed into 5 cc of sterile saline and injected into internal anal sphincter on etther lateral aspect. Anoscopy was then performed again, wounds were irrigated, confirming complete hemostasis. The patient tolerated the procedure well, was woken up and brought to the PACU in stable condition. All counts were correct x2 at the end of the procedure. No complications. Aria Duffy.  Du Wilkerson MD    Electronically signed by Poncho Iverson MD on 5/13/2021 at 12:08 PM

## 2021-05-13 NOTE — H&P
[facility]    [id number]    [facility] Same Day Surgery Update H & P  Department of General Surgery   Surgical Service   Pre-operative History and Physical  Last H & P within the last 30 days. DIAGNOSIS:   Anal fissure [K60.2]    Procedure(s):  EXAM UNDER ANESTHESIA, FISSURECTOMY, BOTOX INJECTION ANAL FISSURE     HISTORY OF PRESENT ILLNESS:   Patient has an anal fissure that causes pain and discharge. Covid 19:  Patient denies fever, chills, cough or known exposure to Covid-19.        Past Medical History:        Diagnosis Date    Anxiety     Depression     Hyperlipidemia     Hypertension     Hypothyroidism     Osteoarthritis      Past Surgical History:        Procedure Laterality Date    COLONOSCOPY  9/12/2014    Dr Lavelle Santillan / Polyps    CYST REMOVAL  9/2011    LUMBAR LAMINECTOMY      OTHER SURGICAL HISTORY  1/29/2015    neck cyst excision     Past Social History:  Social History     Socioeconomic History    Marital status:      Spouse name: None    Number of children: None    Years of education: None    Highest education level: None   Occupational History    None   Social Needs    Financial resource strain: None    Food insecurity     Worry: None     Inability: None    Transportation needs     Medical: None     Non-medical: None   Tobacco Use    Smoking status: Current Every Day Smoker     Packs/day: 0.50     Years: 53.00     Pack years: 26.50     Types: Cigarettes    Smokeless tobacco: Never Used    Tobacco comment: discussed using the water vapor   Substance and Sexual Activity    Alcohol use: Yes     Comment: occasionally    Drug use: No    Sexual activity: Yes     Partners: Female   Lifestyle    Physical activity     Days per week: None     Minutes per session: None    Stress: None   Relationships    Social connections     Talks on phone: None     Gets together: None     Attends Taoism service: None     Active member of club or organization: None     Attends stridor    Heart:  regular rate and rhythm, No murmur noted    Lungs:  No increased work of breathing, good air exchange, clear to auscultation bilaterally, no crackles or wheezing    Abdomen:  soft, non-distended, non-tender, no rebound tenderness or guarding, normal active bowel sounds and no masses palpated    ASSESSMENT AND PLAN     Patient is a 76 y.o. male with above specified procedure planned. 1.  The patients history and physical was obtained and signed off by the pre-admission testing department. Patient seen and focused exam done today- no new changes since last physical exam on 4-    2. Access to ancillary services are available per request of the provider.     Annemarie Briones     5/13/2021

## 2021-05-13 NOTE — ANESTHESIA POSTPROCEDURE EVALUATION
Department of Anesthesiology  Postprocedure Note    Patient: Lavonne Singh  MRN: [id number]  YOB: 1945  Date of evaluation: 5/13/2021  Time:  4:12 PM     Procedure Summary     Date: 05/13/21 Room / Location: [facility]    Anesthesia Start: 1134 Anesthesia Stop: 1226    Procedure: EXAM UNDER ANESTHESIA, BOTOX INJECTION ANAL FISSURE (N/A ) Diagnosis:       Anal fissure      (Anal fissure [K60.2])    Surgeons: Rogelio Mustafa MD Responsible Provider: Anthony Howard DO    Anesthesia Type: general ASA Status: 2          Anesthesia Type: general    Karen Phase I: Karen Score: 10    Karen Phase II: Karen Score: 10    Last vitals: Reviewed and per EMR flowsheets.        Anesthesia Post Evaluation    Patient location during evaluation: PACU  Patient participation: complete - patient participated  Level of consciousness: awake  Pain score: 2  Airway patency: patent  Nausea & Vomiting: no nausea and no vomiting  Complications: no  Cardiovascular status: hemodynamically stable  Respiratory status: acceptable  Hydration status: euvolemic

## 2021-05-13 NOTE — PROGRESS NOTES
Ambulatory Surgery/Procedure Discharge Note    Vitals:    05/13/21 1336   BP: (!) 152/88   Pulse: 52   Resp: 14   Temp: 97.1 °F (36.2 °C)   SpO2: 95%     Elevated BP meets Karen Score criteria    In: 975 [P.O.:200; I.V.:775]  Out: 250 [Urine:250]    Restroom use offered before discharge. Yes    Pain assessment:  level of pain (1-10, 10 severe),   Pain Level: 0    Rectal pad and underwear in place. Tolerates po well and DC instructions given to pt and S. O. with verbalization of understanding of pt and S. O. with both states 'ready to go home'. Patient discharged to home/self care.  Patient discharged via wheel chair by transporter to waiting family/S.O.       5/13/2021 2:02 PM

## 2021-05-25 ENCOUNTER — OFFICE VISIT (OUTPATIENT)
Dept: SURGERY | Age: 76
End: 2021-05-25

## 2021-05-25 VITALS
SYSTOLIC BLOOD PRESSURE: 141 MMHG | HEART RATE: 81 BPM | BODY MASS INDEX: 26.05 KG/M2 | HEIGHT: 70 IN | DIASTOLIC BLOOD PRESSURE: 95 MMHG | WEIGHT: 182 LBS

## 2021-05-25 DIAGNOSIS — K60.2 ANAL FISSURE: Primary | ICD-10-CM

## 2021-05-25 PROCEDURE — 99024 POSTOP FOLLOW-UP VISIT: CPT | Performed by: SURGERY

## 2021-05-25 RX ORDER — OXYCODONE HYDROCHLORIDE 5 MG/1
5 TABLET ORAL EVERY 6 HOURS PRN
Qty: 28 TABLET | Refills: 0 | Status: SHIPPED | OUTPATIENT
Start: 2021-05-25 | End: 2021-06-01

## 2021-05-25 NOTE — PROGRESS NOTES
[address] [address] [address]  Dept: [phone]  Dept Fax: [phone]: [phone]    Visit Date: 5/25/2021    Anton Shultz is a 68 y.o. male who presents today for: Post-Op Check ([address] 5/13 ANAL FISSURE )      Subjective:     Anton Shultz is a 68 y.o. male here for postoperative visit after EUA, Botox anal fissure less than 2 weeks ago. Discussed with him and his wife the operative findings that the fissure itself is actually fairly mild. Unfortunately, he is quite miserable requiring pain medication, and states he is having some mucus incontinence as well as some bleeding. Patient's problem list, medications, past medical, surgical, family, and social histories were reviewed and updated in the chart as indicated today. Objective:     BP (!) 141/95 (Site: Right Upper Arm, Position: Sitting, Cuff Size: Large Adult)   Pulse 81   Ht 5' 10\" (1.778 m)   Wt 182 lb (82.6 kg)   BMI 26.11 kg/m²     Abdominal/wound: Anorectal exam with chaperone in room, noted prolapsing internal hemorrhoids with small amount of ulceration and bleeding. I gently reduce them and he had some relief of his pain    Assessment/Plan:       ASSESSMENT/PLAN:    2-week status post EUA, Botox for mild anal fissure. Discussed that the fissure actually was very mild and likely that his major issue is from his prolapsing hemorrhoids today. I reduced them and instructed them on how to reduce them. I discussed that if the Botox has caused mild incontinence, this will resolve in the next month or so. If the incontinence is mucus from the hemorrhoids, this will resolve a bit quicker as the hemorrhoids improve. I will go ahead and prescribe another week's worth of pain medication for him. DISPOSITION:  Call me next week    Note completed using dictation software, please excuse any errors.     Referring/primary care physician updated through Epic note if PCP was listed.     Electronically signed by Matheus Ngo MD on 5/25/2021 at 2:18 PM

## 2021-06-07 ENCOUNTER — TELEPHONE (OUTPATIENT)
Dept: SURGERY | Age: 76
End: 2021-06-07

## 2021-06-07 NOTE — TELEPHONE ENCOUNTER
Patient called in and stated that he is still in pain and now has developed hemorrhoids    Patient would like to know if Dr. Sabrina Hinkle wants to see him in office    Please contact the patient at [phone]

## 2021-06-08 NOTE — TELEPHONE ENCOUNTER
I can take a look, but unlikely I will do anything surgically at this point, given his recent fissure surgery

## 2021-06-08 NOTE — TELEPHONE ENCOUNTER
Patient returned call and requested to speak with Dr. Reyna Caldwell    Please call patient at [phone]

## 2021-06-08 NOTE — TELEPHONE ENCOUNTER
Patient called in requesting to speak with Dr. Danyelle Coburn.       Please call patient at [phone]

## 2021-06-08 NOTE — TELEPHONE ENCOUNTER
Spoke to him on the phone. Overall doing better.   I will see him next week on Tuesday for office visit

## 2021-06-15 ENCOUNTER — OFFICE VISIT (OUTPATIENT)
Dept: SURGERY | Age: 76
End: 2021-06-15
Payer: MEDICARE

## 2021-06-15 VITALS
WEIGHT: 182 LBS | HEIGHT: 70 IN | SYSTOLIC BLOOD PRESSURE: 160 MMHG | HEART RATE: 63 BPM | DIASTOLIC BLOOD PRESSURE: 98 MMHG | BODY MASS INDEX: 26.05 KG/M2

## 2021-06-15 DIAGNOSIS — K64.1 GRADE II HEMORRHOIDS: ICD-10-CM

## 2021-06-15 PROCEDURE — 46221 LIGATION OF HEMORRHOID(S): CPT | Performed by: SURGERY

## 2021-06-15 PROCEDURE — 99999 PR OFFICE/OUTPT VISIT,PROCEDURE ONLY: CPT | Performed by: SURGERY

## 2021-06-15 NOTE — PROGRESS NOTES
INTERNAL HEMORRHOID RUBBER BAND LIGATION PROCEDURE NOTE:    Patient presented with symptomatic internal hemorrhoids unresponsive to conservative management. See previous office notes for details of previous history and treatments. Risks of rubber band ligation explained to patient, including but not limited to: immediate and delayed bleeding, pain, infection, external hemorrhoids thrombosis, pelvic sepsis, urinary retention, sphincter dysfunction, need for additional procedures, and recurrence. Patient was previously provided with information in office AVS (after visit summary) and given opportunity to ask any questions and bring up any concerns. Chaperone/MA present in room during entire procedure. Patient was placed in either lateral or knee-chest positioning depending on patient preference. Exam table manipulated for proper visualization and lighting. Buttocks spread. Digital exam and anoscopy performed confirming internal hemorrhoids. Suction rubber band ligator used to place band in 3 positions, 1 cm proximal to the dentate line, at the apex of the internal hemorrhoid. Anoscope removed. Patient tolerated the procedure well without complication. EBL minimal. Post procedure expectations and instructions explained to patient. Written instructions provided as well. Disposition: Follow up in 4 weeks for symptom reassessment.     Electronically signed by Stephany Patino MD on 6/15/2021 at 9:30 AM

## 2021-06-15 NOTE — PATIENT INSTRUCTIONS
from our office for free or at most drug stores over-the-counter. At the doctor's office  · Bring a picture ID, insurance information, and any required copay. Please arrive 10 minutes early. · The procedure is performed without sedation, as most patients have only minor discomfort, if any. · Some patients may experience lightheadedness right after the procedure and need to sit down for 5-10 minutes before leaving the office  · Some patients will feel more comfortable having someone else drive them to the appointment, though this is not required    After the procedure:  · There are no specific wound care instructions other than keeping stools soft as mentioned above  · Warm water soaks (5-10 minutes) can be helpful for any discomfort  · You may feel a \"fullness\" in your rectum and the urge to defecate during the first 24 hours. This is normal.  · You should expect the hemorrhoid tissue to fall off and pass within 2-10 days. This may be accompanied by passing tissue or a small amount of blood. This is normal.  · You will have an office appointment scheduled in 3-4 weeks to assess the need for additional rubber banding or other treatments. Risks and potential complications  · Potential need for additional rubber banding in office (common)  · Potential need for future hemorrhoid surgery (uncommon)  · Clotting and pain from external hemorrhoids (uncommon)  · Rectal bleeding requiring surgery (uncommon)  · Difficulty with urinating (uncommon)  · Damage to sphincter muscle resulting in changes in your ability to control stool or gas (rare)  · Infections requiring emergency surgery and colostomy (very rare)    When should you call the office? · You have any questions or concerns. · You don't understand how to prepare for your procedure.   · You experience sharp or severe pain that doesn't subside within 1-2 hours  · You have excessive bleeding, fever, chills, or inability to urinate  · You need to reschedule or have changed your mind about having the procedure. · Dr Linton Book office phone # is [phone]  · If you are unable to reach the office (outside of normal business hours) and you have any concerns, go to your nearest emergency room.

## 2021-06-28 ENCOUNTER — TELEPHONE (OUTPATIENT)
Dept: SURGERY | Age: 76
End: 2021-06-28

## 2021-06-28 NOTE — TELEPHONE ENCOUNTER
Patient called in stating that he is doing well after his banding, but he thinks there is one more hemorrhoid that needs a possible banding     Please contact the patient at [phone]

## 2021-07-07 ENCOUNTER — OFFICE VISIT (OUTPATIENT)
Dept: SURGERY | Age: 76
End: 2021-07-07
Payer: MEDICARE

## 2021-07-07 VITALS
SYSTOLIC BLOOD PRESSURE: 161 MMHG | DIASTOLIC BLOOD PRESSURE: 94 MMHG | WEIGHT: 185 LBS | OXYGEN SATURATION: 96 % | HEIGHT: 70 IN | TEMPERATURE: 97.4 F | BODY MASS INDEX: 26.48 KG/M2 | HEART RATE: 71 BPM

## 2021-07-07 DIAGNOSIS — K64.1 GRADE II HEMORRHOIDS: Primary | ICD-10-CM

## 2021-07-07 PROCEDURE — 99999 PR OFFICE/OUTPT VISIT,PROCEDURE ONLY: CPT | Performed by: SURGERY

## 2021-07-07 PROCEDURE — 46221 LIGATION OF HEMORRHOID(S): CPT | Performed by: SURGERY

## 2021-07-07 RX ORDER — OXYCODONE HYDROCHLORIDE AND ACETAMINOPHEN 5; 325 MG/1; MG/1
1 TABLET ORAL EVERY 6 HOURS PRN
Qty: 12 TABLET | Refills: 0 | Status: SHIPPED | OUTPATIENT
Start: 2021-07-07 | End: 2021-07-10

## 2021-07-07 NOTE — PATIENT INSTRUCTIONS
DISCHARGE INSTRUCTIONS:     Rubber Band Ligation for Hemorrhoids: Before, During, and After Your Procedure    What is rubber band ligation? Rubber band ligation treats hemorrhoids. Hemorrhoids are swollen veins in the rectal area that can commonly cause bleeding, excess tissue (prolapse), discomfort, and difficulty keeping clean. This treatment is only for internal hemorrhoids. To do the procedure, your doctor puts a special viewing tool into your anus. This tool is called an anoscope. The doctor then uses a suction tool to grab the hemorrhoid and put a rubber band around it. The band stops the blood flow. This causes the hemorrhoids to shrink and fall off in 2 to 10 days, and purposeful scarring of the tissue back into the rectum. This procedure is done in the office. Typically one hemorrhoid is treated at a time during your first visit. More can be treated if a repeat procedure is required. The procedure takes about 10-15 minutes. You can go home when it's done. Some people are able to return to regular activities right away. It's very important not to strain when you have a bowel movement before and after your procedure. Continue with your daily fiber supplement (metamucil, benefiber, konsyl, generic brand), healthy fruits and vegetables, plenty of water (4-6 glasses per day), and MiraLax as needed. Stools should be soft and easy to pass, but not diarrhea. Preparing for the procedure  · Understand exactly what procedure is planned, along with the risks, benefits, and other options. · If you take blood thinners, such as warfarin (Coumadin), clopidogrel (Plavix), or aspirin, be sure to talk to your doctor. He or she will tell you if you should stop taking these medicines before your procedure. Make sure that you understand exactly what your doctor wants you to do. · Please perform a fleets enema 1-2 hours before your appointment. This will insure the rectum is cleaned out.  This can be provided from our office for free or at most drug stores over-the-counter. At the doctor's office  · Bring a picture ID, insurance information, and any required copay. Please arrive 10 minutes early. · The procedure is performed without sedation, as most patients have only minor discomfort, if any. · Some patients may experience lightheadedness right after the procedure and need to sit down for 5-10 minutes before leaving the office  · Some patients will feel more comfortable having someone else drive them to the appointment, though this is not required    After the procedure:  · There are no specific wound care instructions other than keeping stools soft as mentioned above  · Warm water soaks (5-10 minutes) can be helpful for any discomfort  · You may feel a \"fullness\" in your rectum and the urge to defecate during the first 24 hours. This is normal.  · You should expect the hemorrhoid tissue to fall off and pass within 2-10 days. This may be accompanied by passing tissue or a small amount of blood. This is normal.  · You will have an office appointment scheduled in 3-4 weeks to assess the need for additional rubber banding or other treatments. Risks and potential complications  · Potential need for additional rubber banding in office (common)  · Potential need for future hemorrhoid surgery (uncommon)  · Clotting and pain from external hemorrhoids (uncommon)  · Rectal bleeding requiring surgery (uncommon)  · Difficulty with urinating (uncommon)  · Damage to sphincter muscle resulting in changes in your ability to control stool or gas (rare)  · Infections requiring emergency surgery and colostomy (very rare)    When should you call the office? · You have any questions or concerns. · You don't understand how to prepare for your procedure.   · You experience sharp or severe pain that doesn't subside within 1-2 hours  · You have excessive bleeding, fever, chills, or inability to urinate  · You need to reschedule or have changed your mind about having the procedure. · Dr Ventura [facility] office phone # is [phone]  · If you are unable to reach the office (outside of normal business hours) and you have any concerns, go to your nearest emergency room.

## 2021-07-07 NOTE — PROGRESS NOTES
INTERNAL HEMORRHOID RUBBER BAND LIGATION PROCEDURE NOTE:    Patient presented with symptomatic internal hemorrhoids unresponsive to conservative management. See previous office notes for details of previous history and treatments. Risks of rubber band ligation explained to patient, including but not limited to: immediate and delayed bleeding, pain, infection, external hemorrhoids thrombosis, pelvic sepsis, urinary retention, sphincter dysfunction, need for additional procedures, and recurrence. Patient was previously provided with information in office AVS (after visit summary) and given opportunity to ask any questions and bring up any concerns. Chaperone/MA present in room during entire procedure. Patient was placed in either lateral or knee-chest positioning depending on patient preference. Exam table manipulated for proper visualization and lighting. Buttocks spread. Digital exam and anoscopy performed confirming internal hemorrhoids. Suction rubber band ligator used to place band in 2 positions, 1 cm proximal to the dentate line, at the apex of the internal hemorrhoid. Anoscope removed. Patient tolerated the procedure well without complication. EBL minimal. Post procedure expectations and instructions explained to patient. Written instructions provided as well. Disposition: Follow up in 4 weeks for symptom reassessment.     Electronically signed by Donna Watt MD on 7/7/2021 at 2:35 PM

## 2021-09-16 ENCOUNTER — PROCEDURE VISIT (OUTPATIENT)
Dept: SURGERY | Age: 76
End: 2021-09-16
Payer: MEDICARE

## 2021-09-16 ENCOUNTER — OFFICE VISIT (OUTPATIENT)
Dept: SURGERY | Age: 76
End: 2021-09-16
Payer: MEDICARE

## 2021-09-16 VITALS
DIASTOLIC BLOOD PRESSURE: 80 MMHG | SYSTOLIC BLOOD PRESSURE: 136 MMHG | WEIGHT: 183 LBS | HEIGHT: 69 IN | BODY MASS INDEX: 27.11 KG/M2

## 2021-09-16 DIAGNOSIS — I71.40 ABDOMINAL AORTIC ANEURYSM (AAA) WITHOUT RUPTURE: Primary | ICD-10-CM

## 2021-09-16 DIAGNOSIS — I10 HYPERTENSION, UNSPECIFIED TYPE: ICD-10-CM

## 2021-09-16 DIAGNOSIS — I77.1 ILIAC ARTERY STENOSIS, LEFT (HCC): ICD-10-CM

## 2021-09-16 DIAGNOSIS — Z72.0 TOBACCO ABUSE: ICD-10-CM

## 2021-09-16 PROCEDURE — G8417 CALC BMI ABV UP PARAM F/U: HCPCS | Performed by: NURSE PRACTITIONER

## 2021-09-16 PROCEDURE — 1123F ACP DISCUSS/DSCN MKR DOCD: CPT | Performed by: NURSE PRACTITIONER

## 2021-09-16 PROCEDURE — 4004F PT TOBACCO SCREEN RCVD TLK: CPT | Performed by: NURSE PRACTITIONER

## 2021-09-16 PROCEDURE — 99214 OFFICE O/P EST MOD 30 MIN: CPT | Performed by: NURSE PRACTITIONER

## 2021-09-16 PROCEDURE — G8427 DOCREV CUR MEDS BY ELIG CLIN: HCPCS | Performed by: NURSE PRACTITIONER

## 2021-09-16 PROCEDURE — 4040F PNEUMOC VAC/ADMIN/RCVD: CPT | Performed by: NURSE PRACTITIONER

## 2021-09-16 ASSESSMENT — ENCOUNTER SYMPTOMS
EYES NEGATIVE: 1
RESPIRATORY NEGATIVE: 1
GASTROINTESTINAL NEGATIVE: 1
ALLERGIC/IMMUNOLOGIC NEGATIVE: 1

## 2021-09-16 NOTE — LETTER
[facility]  [address]  [facility] [address]  Phone: [phone]  Fax: [phone]    DEDRA Kennedy CNP        September 16, 2021      Barbara Page, [address] [place] [address]     Patient: Peewee Serrano    MR Number: <[id number]>    YOB: 1945    Date of Visit: 9/16/2021        Dear Barbara Page:    I saw your patient Osmar Zee, in the office for surveillance of an abdominal aortic aneurysm. His ultrasound showed no significant change in the size of his aneurysm (4.63 cm x 4.44 cm). I have asked the patient to follow up in 6 months with a repeat ultrasound. I will keep you posted regarding this patient's progress.     Sincerely,      DEDRA Kennedy CNP

## 2021-09-16 NOTE — PATIENT INSTRUCTIONS
Return in about 6 months (around 3/16/2022) for AAA scan and office visit. PATIENT EDUCATION focused on A&P of aneurysms and strong familial incidence. Patient was informed that smoking and high blood pressure can affect aneurysms. Smoking can thin vessels and high blood pressure results in too much pressure inside the vessel, which can potentially stretch it further. I explained that it is important to make family members aware of the influence of genetic factors in the development of an aneurysm.

## 2021-09-17 PROCEDURE — 93978 VASCULAR STUDY: CPT | Performed by: SURGERY

## 2021-09-17 NOTE — PROGRESS NOTES
9/16/2021  Chief Complaint   Patient presents with    Circulatory Problem     6 month FU with AAA scan and office visit         Pain Assessment  The patient is currently not experiencing any pain at this time. HISTORY OF PRESENT ILLNESS:      Dinah Stearns is a 68 y.o. male who presents with a complaint of Aneurysm follow up. The Aneurysm is located in the abdominal aorta. Patient was last seen 3/10/2021. Previous measurement was 4.71 x 4.51 cms per duplex scan at last evaluation. Current measurement is 4.63 x 4.44 cms per duplex scan. The patient has been asymptomatic since last visit. Contributing factors include use of tobacco and hypertension. Previous diagnostic tests have included CT scan and vascular scan. Recent diagnostic tests include: vascular scan. There has been no relevant prior surgery. He continues to smoke just under 1 PPD and has been smoking since he was 15years old. 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 Review of Systems   Constitutional: Negative. HENT: Negative. Eyes: Negative. Respiratory: Negative. Cardiovascular: Negative. Gastrointestinal: Negative. Endocrine: Negative. Genitourinary: Negative. Musculoskeletal: Negative. Skin: Negative. Allergic/Immunologic: Negative. Neurological: Negative. Hematological: Negative. Psychiatric/Behavioral: Negative. All other systems reviewed and are negative. Allergies   Allergen Reactions    Lidocaine      Burning sensation         Prior to Visit Medications    Medication Sig Taking? Authorizing Provider   gabapentin (NEURONTIN) 300 MG capsule Take 300 mg by mouth 3 times daily.  Yes Historical Provider, MD   amLODIPine (NORVASC) 10 MG tablet TAKE 1 TABLET EVERY DAY Yes Barbara Page MD   levothyroxine (SYNTHROID) 88 MCG tablet TAKE 1 TABLET EVERY DAY Yes Barbara Page MD   lisinopril (PRINIVIL;ZESTRIL) 30 MG tablet TAKE ONE TABLET BY MOUTH DAILY Yes Francisco Jenkins normal. There is no distension or abdominal bruit. Palpations: Abdomen is soft. Pulsatile midline mass: AAA measures 4.63 cm x 4.44 cm per vascular scan today. Tenderness: There is no abdominal tenderness. There is no guarding or rebound. Musculoskeletal:         General: No tenderness. Normal range of motion. Cervical back: Normal range of motion and neck supple. Skin:     General: Skin is warm and dry. Neurological:      Mental Status: He is alert and oriented to person, place, and time. Cranial Nerves: No cranial nerve deficit. Sensory: No sensory deficit. Motor: No tremor, atrophy or abnormal muscle tone. Coordination: Coordination normal.      Gait: Gait normal.   Psychiatric:         Speech: Speech normal.         Behavior: Behavior normal.         Thought Content: Thought content normal.         Judgment: Judgment normal.         ASSESSMENT:     Diagnosis   1. Abdominal aortic aneurysm (AAA) without rupture (HCC)  4.63 cm x 4.44 cm   2. Iliac artery stenosis, left (HCC) Velocity is 206.1 cm/s   3. Tobacco abuse   The patient was instructed/counseled on smoking cessation. 4. Hypertension, unspecified type - stable, on lisinopril 30 mg, amlodipine 10 mg and metoprolol 25 mg         PATIENT EDUCATION focused on A&P of aneurysms and strong familial incidence. Patient was informed that smoking and high blood pressure can affect aneurysms. Smoking can thin vessels and high blood pressure results in too much pressure inside the vessel, which can potentially stretch it further. I explained that it is important to make family members aware of the influence of genetic factors in the development of an aneurysm. PLAN:      Return in about 6 months (around 3/16/2022) for AAA scan and office visit.

## 2021-11-04 ENCOUNTER — APPOINTMENT (OUTPATIENT)
Dept: CT IMAGING | Age: 76
End: 2021-11-04
Payer: MEDICARE

## 2021-11-04 ENCOUNTER — HOSPITAL ENCOUNTER (EMERGENCY)
Age: 76
Discharge: HOME | End: 2021-11-04
Attending: EMERGENCY MEDICINE
Payer: MEDICARE

## 2021-11-04 VITALS
DIASTOLIC BLOOD PRESSURE: 77 MMHG | TEMPERATURE: 98.7 F | SYSTOLIC BLOOD PRESSURE: 149 MMHG | RESPIRATION RATE: 27 BRPM | OXYGEN SATURATION: 97 % | HEIGHT: 70 IN | WEIGHT: 186.29 LBS | HEART RATE: 50 BPM | BODY MASS INDEX: 26.67 KG/M2

## 2021-11-04 DIAGNOSIS — N20.1 LEFT URETERAL STONE: Primary | ICD-10-CM

## 2021-11-04 LAB
A/G RATIO: 1.5 (ref 1.1–2.2)
ALBUMIN SERPL-MCNC: 4.1 G/DL (ref 3.4–5)
ALP BLD-CCNC: 73 U/L (ref 40–129)
ALT SERPL-CCNC: 16 U/L (ref 10–40)
ANION GAP SERPL CALCULATED.3IONS-SCNC: 10 MMOL/L (ref 3–16)
AST SERPL-CCNC: 17 U/L (ref 15–37)
BACTERIA: ABNORMAL /HPF
BASOPHILS ABSOLUTE: 0.1 K/UL (ref 0–0.2)
BASOPHILS RELATIVE PERCENT: 0.8 %
BILIRUB SERPL-MCNC: <0.2 MG/DL (ref 0–1)
BILIRUBIN URINE: NEGATIVE
BLOOD, URINE: ABNORMAL
BUN BLDV-MCNC: 24 MG/DL (ref 7–20)
CALCIUM SERPL-MCNC: 9.5 MG/DL (ref 8.3–10.6)
CHLORIDE BLD-SCNC: 106 MMOL/L (ref 99–110)
CLARITY: CLEAR
CO2: 24 MMOL/L (ref 21–32)
COLOR: YELLOW
CREAT SERPL-MCNC: 1.2 MG/DL (ref 0.8–1.3)
EOSINOPHILS ABSOLUTE: 0.5 K/UL (ref 0–0.6)
EOSINOPHILS RELATIVE PERCENT: 3.4 %
EPITHELIAL CELLS, UA: ABNORMAL /HPF (ref 0–5)
GFR AFRICAN AMERICAN: >60
GFR NON-AFRICAN AMERICAN: 59
GLUCOSE BLD-MCNC: 149 MG/DL (ref 70–99)
GLUCOSE URINE: NEGATIVE MG/DL
HCT VFR BLD CALC: 40.2 % (ref 40.5–52.5)
HEMOGLOBIN: 13.5 G/DL (ref 13.5–17.5)
KETONES, URINE: ABNORMAL MG/DL
LEUKOCYTE ESTERASE, URINE: ABNORMAL
LIPASE: 38 U/L (ref 13–60)
LYMPHOCYTES ABSOLUTE: 5 K/UL (ref 1–5.1)
LYMPHOCYTES RELATIVE PERCENT: 35.5 %
MCH RBC QN AUTO: 30.3 PG (ref 26–34)
MCHC RBC AUTO-ENTMCNC: 33.5 G/DL (ref 31–36)
MCV RBC AUTO: 90.4 FL (ref 80–100)
MICROSCOPIC EXAMINATION: YES
MONOCYTES ABSOLUTE: 0.9 K/UL (ref 0–1.3)
MONOCYTES RELATIVE PERCENT: 6.4 %
NEUTROPHILS ABSOLUTE: 7.7 K/UL (ref 1.7–7.7)
NEUTROPHILS RELATIVE PERCENT: 53.9 %
NITRITE, URINE: NEGATIVE
PDW BLD-RTO: 15.5 % (ref 12.4–15.4)
PH UA: 7 (ref 5–8)
PLATELET # BLD: 251 K/UL (ref 135–450)
PMV BLD AUTO: 8 FL (ref 5–10.5)
POTASSIUM SERPL-SCNC: 3.9 MMOL/L (ref 3.5–5.1)
PROTEIN UA: NEGATIVE MG/DL
RBC # BLD: 4.45 M/UL (ref 4.2–5.9)
RBC UA: ABNORMAL /HPF (ref 0–4)
SODIUM BLD-SCNC: 140 MMOL/L (ref 136–145)
SPECIFIC GRAVITY UA: 1.02 (ref 1–1.03)
TOTAL PROTEIN: 6.8 G/DL (ref 6.4–8.2)
URINE REFLEX TO CULTURE: ABNORMAL
URINE TYPE: ABNORMAL
UROBILINOGEN, URINE: 0.2 E.U./DL
WBC # BLD: 14.2 K/UL (ref 4–11)
WBC UA: ABNORMAL /HPF (ref 0–5)

## 2021-11-04 PROCEDURE — 85025 COMPLETE CBC W/AUTO DIFF WBC: CPT

## 2021-11-04 PROCEDURE — 96376 TX/PRO/DX INJ SAME DRUG ADON: CPT

## 2021-11-04 PROCEDURE — 36415 COLL VENOUS BLD VENIPUNCTURE: CPT

## 2021-11-04 PROCEDURE — 81001 URINALYSIS AUTO W/SCOPE: CPT

## 2021-11-04 PROCEDURE — 99285 EMERGENCY DEPT VISIT HI MDM: CPT

## 2021-11-04 PROCEDURE — 74177 CT ABD & PELVIS W/CONTRAST: CPT

## 2021-11-04 PROCEDURE — 83690 ASSAY OF LIPASE: CPT

## 2021-11-04 PROCEDURE — 80053 COMPREHEN METABOLIC PANEL: CPT

## 2021-11-04 PROCEDURE — 96374 THER/PROPH/DIAG INJ IV PUSH: CPT

## 2021-11-04 PROCEDURE — 96375 TX/PRO/DX INJ NEW DRUG ADDON: CPT

## 2021-11-04 PROCEDURE — 6360000004 HC RX CONTRAST MEDICATION: Performed by: EMERGENCY MEDICINE

## 2021-11-04 PROCEDURE — 6360000002 HC RX W HCPCS: Performed by: EMERGENCY MEDICINE

## 2021-11-04 RX ORDER — ONDANSETRON 4 MG/1
4 TABLET, ORALLY DISINTEGRATING ORAL EVERY 6 HOURS PRN
Qty: 12 TABLET | Refills: 0 | Status: SHIPPED | OUTPATIENT
Start: 2021-11-04

## 2021-11-04 RX ORDER — OXYCODONE HYDROCHLORIDE AND ACETAMINOPHEN 5; 325 MG/1; MG/1
1 TABLET ORAL EVERY 6 HOURS PRN
Qty: 12 TABLET | Refills: 0 | Status: SHIPPED | OUTPATIENT
Start: 2021-11-04 | End: 2021-11-07

## 2021-11-04 RX ORDER — MORPHINE SULFATE 4 MG/ML
4 INJECTION, SOLUTION INTRAMUSCULAR; INTRAVENOUS ONCE
Status: COMPLETED | OUTPATIENT
Start: 2021-11-04 | End: 2021-11-04

## 2021-11-04 RX ORDER — TAMSULOSIN HYDROCHLORIDE 0.4 MG/1
0.4 CAPSULE ORAL DAILY
Qty: 10 CAPSULE | Refills: 0 | Status: SHIPPED | OUTPATIENT
Start: 2021-11-04 | End: 2022-03-08 | Stop reason: CLARIF

## 2021-11-04 RX ORDER — MORPHINE SULFATE 2 MG/ML
4 INJECTION, SOLUTION INTRAMUSCULAR; INTRAVENOUS ONCE
Status: COMPLETED | OUTPATIENT
Start: 2021-11-04 | End: 2021-11-04

## 2021-11-04 RX ORDER — KETOROLAC TROMETHAMINE 30 MG/ML
15 INJECTION, SOLUTION INTRAMUSCULAR; INTRAVENOUS ONCE
Status: COMPLETED | OUTPATIENT
Start: 2021-11-04 | End: 2021-11-04

## 2021-11-04 RX ORDER — ONDANSETRON 2 MG/ML
4 INJECTION INTRAMUSCULAR; INTRAVENOUS ONCE
Status: COMPLETED | OUTPATIENT
Start: 2021-11-04 | End: 2021-11-04

## 2021-11-04 RX ADMIN — KETOROLAC TROMETHAMINE 15 MG: 30 INJECTION, SOLUTION INTRAMUSCULAR at 06:20

## 2021-11-04 RX ADMIN — MORPHINE SULFATE 4 MG: 2 INJECTION, SOLUTION INTRAMUSCULAR; INTRAVENOUS at 04:09

## 2021-11-04 RX ADMIN — MORPHINE SULFATE 4 MG: 4 INJECTION INTRAVENOUS at 03:37

## 2021-11-04 RX ADMIN — HYDROMORPHONE HYDROCHLORIDE 0.5 MG: 1 INJECTION, SOLUTION INTRAMUSCULAR; INTRAVENOUS; SUBCUTANEOUS at 05:26

## 2021-11-04 RX ADMIN — ONDANSETRON 4 MG: 2 INJECTION INTRAMUSCULAR; INTRAVENOUS at 03:37

## 2021-11-04 RX ADMIN — HYDROMORPHONE HYDROCHLORIDE 0.5 MG: 1 INJECTION, SOLUTION INTRAMUSCULAR; INTRAVENOUS; SUBCUTANEOUS at 04:43

## 2021-11-04 RX ADMIN — IOPAMIDOL 100 ML: 755 INJECTION, SOLUTION INTRAVENOUS at 04:15

## 2021-11-04 ASSESSMENT — PAIN DESCRIPTION - DESCRIPTORS
DESCRIPTORS: ACHING
DESCRIPTORS: ACHING

## 2021-11-04 ASSESSMENT — PAIN SCALES - GENERAL
PAINLEVEL_OUTOF10: 2
PAINLEVEL_OUTOF10: 10
PAINLEVEL_OUTOF10: 2
PAINLEVEL_OUTOF10: 10
PAINLEVEL_OUTOF10: 4
PAINLEVEL_OUTOF10: 10
PAINLEVEL_OUTOF10: 5
PAINLEVEL_OUTOF10: 5
PAINLEVEL_OUTOF10: 10
PAINLEVEL_OUTOF10: 2

## 2021-11-04 ASSESSMENT — PAIN DESCRIPTION - PAIN TYPE
TYPE: ACUTE PAIN

## 2021-11-04 ASSESSMENT — PAIN - FUNCTIONAL ASSESSMENT: PAIN_FUNCTIONAL_ASSESSMENT: 0-10

## 2021-11-04 ASSESSMENT — PAIN DESCRIPTION - FREQUENCY
FREQUENCY: CONTINUOUS

## 2021-11-04 ASSESSMENT — PAIN DESCRIPTION - LOCATION
LOCATION: FLANK

## 2021-11-04 ASSESSMENT — PAIN DESCRIPTION - ORIENTATION
ORIENTATION: LEFT

## 2021-11-04 NOTE — ED NOTES
Patient states,  His pain is coming back. Wife states, it comes in spasms of pain.  aware.       Jessie Fischer RN  11/04/21 6949

## 2021-11-04 NOTE — ED PROVIDER NOTES
[facility]  eMERGENCY dEPARTMENT eNCOUnter      Pt Name: Kirill Bauer  MRN: [id number]  [place] 1945  Date of evaluation: 11/4/2021  Provider: Elizabeth Aiken MD    [address] [place] [address]       Chief Complaint   Patient presents with    Flank Pain     woke up at 2 am with left side flank pain         CRITICAL CARE TIME   Total Critical Care time was 0 minutes, excluding separately reportable procedures. There was a high probability of clinically significant/life threatening deterioration in the patient's condition which required my urgent intervention. HISTORY OF PRESENT ILLNESS  (Location/Symptom, Timing/Onset, Context/Setting, Quality, Duration, Modifying Factors, Severity.)   Kirill Bauer is a 68 y.o. male who presents to the emergency department complaining of severe left flank pain sudden onset that woke him up at 2 AM.  He has nausea but no vomiting. No frequency urgency or dysuria. He said he voided before he left home. He has no previous history of kidney stones. He has a history of an abdominal aortic aneurysm that which they have been monitoring closely every 6 months. Nursing Notes were reviewed and I agree. REVIEW OF SYSTEMS    (2-9 systems for level 4, 10 or more for level 5)     General: No fever or chills. ENT: Negative. Cardiovascular: No chest pain. Pulmonary: No shortness of breath or cough. GI: Left flank, left lower abdominal pain. Nausea, no vomiting. : No frequency urgency or dysuria. No change in color of her urine. Musculoskeletal: No lower extremity pain. Except as noted above the remainder of the review of systems was reviewed and negative.        PAST MEDICAL HISTORY     Past Medical History:   Diagnosis Date    Anxiety     Depression     Hyperlipidemia     Hypertension     Hypothyroidism     Osteoarthritis          SURGICAL HISTORY       Past Surgical History:   Procedure Laterality Date    ANUS SURGERY N/A 5/13/2021    EXAM UNDER ANESTHESIA, BOTOX INJECTION ANAL FISSURE performed by Cathy Santamaria MD at [address] [facility]  9/12/2014    Dr Ashley Wallace / Alex Mayen  9/2011    LUMBAR LAMINECTOMY      OTHER SURGICAL HISTORY  1/29/2015    neck cyst excision         CURRENT MEDICATIONS       Previous Medications    AMLODIPINE (NORVASC) 10 MG TABLET    TAKE 1 TABLET EVERY DAY    ASPIRIN 81 MG TABLET    Take 81 mg by mouth daily. CALCIUM POLYCARBOPHIL (FIBERCON PO)    As directed     ESCITALOPRAM (LEXAPRO) 10 MG TABLET    Take 15 mg by mouth daily     GABAPENTIN (NEURONTIN) 300 MG CAPSULE    Take 300 mg by mouth 3 times daily. LEVOTHYROXINE (SYNTHROID) 88 MCG TABLET    TAKE 1 TABLET EVERY DAY    LISINOPRIL (PRINIVIL;ZESTRIL) 30 MG TABLET    TAKE 1 TABLET EVERY DAY    LORAZEPAM (ATIVAN) 1 MG TABLET    Take 1 mg by mouth 2 times daily as needed for Anxiety.     METOPROLOL TARTRATE (LOPRESSOR) 25 MG TABLET    TAKE 1 TABLET DAILY    MIRTAZAPINE (REMERON) 45 MG TABLET    Take 45 mg by mouth nightly    SIMVASTATIN (ZOCOR) 20 MG TABLET    TAKE 1 TABLET AT BEDTIME       ALLERGIES     Lidocaine    FAMILY HISTORY       Family History   Adopted: Yes          SOCIAL HISTORY       Social History     Socioeconomic History    Marital status:      Spouse name: None    Number of children: None    Years of education: None    Highest education level: None   Occupational History    None   Tobacco Use    Smoking status: Current Every Day Smoker     Packs/day: 0.50     Years: 53.00     Pack years: 26.50     Types: Cigarettes    Smokeless tobacco: Never Used    Tobacco comment: discussed the effect smoking has on aneurysms   Substance and Sexual Activity    Alcohol use: Yes     Comment: rare    Drug use: No    Sexual activity: Yes     Partners: Female   Other Topics Concern    None   Social History Narrative    None     Social Determinants of Health     Financial Resource Strain: High Risk    Difficulty of Paying Living Expenses: Very hard   Food Insecurity: No Food Insecurity    Worried About Running Out of Food in the Last Year: Never true    Ran Out of Food in the Last Year: Never true   Transportation Needs:     Lack of Transportation (Medical):  Lack of Transportation (Non-Medical):    Physical Activity:     Days of Exercise per Week:     Minutes of Exercise per Session:    Stress:     Feeling of Stress :    Social Connections:     Frequency of Communication with Friends and Family:     Frequency of Social Gatherings with Friends and Family:     Attends Gnosticist Services:     Active Member of Clubs or Organizations:     Attends Club or Organization Meetings:     Marital Status:    Intimate Partner Violence:     Fear of Current or Ex-Partner:     Emotionally Abused:     Physically Abused:     Sexually Abused:          PHYSICAL EXAM    (up to 7 for level 4, 8 or more for level 5)     ED Triage Vitals [11/04/21 0319]   BP Temp Temp Source Pulse Resp SpO2 Height Weight   (!) 177/90 97 °F (36.1 °C) Oral 59 20 97 % 5' 10\" (1.778 m) 186 lb 4.6 oz (84.5 kg)       General: Alert male appears uncomfortable. Head: Atraumatic and normocephalic. Eyes: No conjunctival injection. No pallor. Pupils equal round reactive. ENT: Trude Crosser is clear. Oropharynx moist without erythema. Neck: Supple without adenopathy, nontender. Heart: Regular rate and rhythm. No murmurs or gallops noted. Lungs: Breath sounds equal bilaterally and clear. Abdomen: Soft, nondistended, mild left lower abdominal tenderness without guarding or rebound. No palpable mass organomegaly. No flank tenderness. Bowel sounds are normal.  Musculoskeletal: No lower extremity edema. Intact symmetrical distal pulses upper lower extremity. Skin: Warm and dry, good turgor. No pallor or cyanosis. No diaphoresis. Neuro: Awake, alert, oriented. No focal motor deficits. Normal gait.       DIFFERENTIAL DIAGNOSIS   Differential includes but is not limited to pyelonephritis, ureterolithiasis, diverticulitis, ruptured/leaking abdominal aortic aneurysm, other. DIAGNOSTIC RESULTS     EKG: All EKG's are interpreted by Wan Drake MD in the absence of a cardiologist.      RADIOLOGY:   Non-plain film images such as CT, Ultrasound and MRI are read by the radiologist. Plain radiographic images are visualized and preliminarily interpreted Wan Drake MD with the below findings:      Interpretation per the Radiologist below, if available at the time of this note:    CT ABDOMEN PELVIS W IV CONTRAST Additional Contrast? None   Final Result   1. Severe perinephric stranding around the left kidney which appears to be   due to a stone in the proximal left ureter measuring 5 mm. 2. AAA measuring up to 5.1 cm.   3. Dense nodular airspace opacification in the visualized lungs. Pattern may   represent bronchopneumonia or developing viral pattern of pneumonitis. Correlate with pulmonary symptoms.                ED BEDSIDE ULTRASOUND:   Performed by ED Physician - none    LABS:  Labs Reviewed   URINE RT REFLEX TO CULTURE - Abnormal; Notable for the following components:       Result Value    Ketones, Urine TRACE (*)     Blood, Urine MODERATE (*)     Leukocyte Esterase, Urine TRACE (*)     All other components within normal limits    Narrative:     Performed at:  [facility]  [address]   Phone [phone]   CBC WITH AUTO DIFFERENTIAL - Abnormal; Notable for the following components:    WBC 14.2 (*)     Hematocrit 40.2 (*)     RDW 15.5 (*)     All other components within normal limits    Narrative:     Performed at:  [facility]) - [facility]  [address]   Phone [phone]   COMPREHENSIVE METABOLIC PANEL - Abnormal; Notable for the following components:    Glucose 149 (*)     BUN 24 (*)     GFR Non- American 61 (*)     All other components within normal limits    Narrative:     Performed at:  [facility]) [facility]  [address] [facility]   Phone [phone]   MICROSCOPIC URINALYSIS - Abnormal; Notable for the following components:    RBC, UA 11-20 (*)     Bacteria, UA Rare (*)     All other components within normal limits    Narrative:     Performed at:  [facility]) - [facility]  [address]  [facility]   Phone [phone]   LIPASE    Narrative:     Performed at:  [address] [place] [address]  [address]  [facility]   Phone [phone]       All other labs were within normal range or not returned as of this dictation. EMERGENCY DEPARTMENT COURSE and DIFFERENTIAL DIAGNOSIS/MDM:   Vitals:    Vitals:    11/04/21 0545 11/04/21 0600 11/04/21 0615 11/04/21 0630   BP: (!) 140/77 138/78 (!) 153/77 (!) 149/77   Pulse: 59 59 60 50   Resp: 19 20 23 27   Temp:    98.7 °F (37.1 °C)   TempSrc:    Oral   SpO2: 93% 95% 97% 97%   Weight:       Height:           Patient presents with sudden onset left flank pain with nausea. He has hematuria with 11-20 red cells in his urine. He has 0-2 white cells. His BUN is 24 with a creatinine of 1.2. His liver enzymes and lipase are normal.  His CT shows perinephric stranding on the left with a proximal left ureteral stone measuring 5 mm. He does have an abdominal aortic aneurysm up to 5.1 cm which is known by the patient and has been followed regularly every 6 months. There are some nodular opacifications in the lungs which could represent developing pneumonia, per radiology correlate with symptoms. Patient was medicated for pain and nausea. His pain and nausea were controlled. He was referred to urology for follow-up. He was prescribed medication for pain control and nausea. He was put on Flomax. He has no respiratory symptoms.   He will follow up with his primary care physician for recheck of his lungs. He will return here for uncontrolled pain, uncontrolled vomiting, fever, inability to urinate. Test results, diagnosis, and treatment plan were discussed with the patient and his wife. They understand the treatment plan follow-up as discussed. CONSULTS:  None    PROCEDURES:  None    FINAL IMPRESSION      1. Left ureteral stone          DISPOSITION/PLAN   DISPOSITION Decision To Discharge 11/04/2021 06:34:37 AM      PATIENT REFERRED TO:  Lily Esquivel MD  [address] [place] [address]  [phone]    In 3 days        DISCHARGE MEDICATIONS:  New Prescriptions    ONDANSETRON (ZOFRAN ODT) 4 MG DISINTEGRATING TABLET    Take 1 tablet by mouth every 6 hours as needed for Nausea    OXYCODONE-ACETAMINOPHEN (PERCOCET) 5-325 MG PER TABLET    Take 1 tablet by mouth every 6 hours as needed for Pain for up to 3 days. Intended supply: 3 days.  Take lowest dose possible to manage pain    TAMSULOSIN (FLOMAX) 0.4 MG CAPSULE    Take 1 capsule by mouth daily for 10 days       (Please note that portions of this note were completed with a voice recognition program.  Efforts were made to edit the dictations but occasionally words are mis-transcribed.)    Simon Rivera MD  Attending Emergency Physician        Aurelia West MD  11/04/21 0987

## 2021-11-04 NOTE — ED NOTES
Patient resting in bed. States, his pain is down 2-3 out of 10.  Wife at bedside      Eric Watson, [address]  11/04/21 8985

## 2021-11-04 NOTE — ED NOTES
States, the pain medication didn't work.  Patient request more pain medication      Gisela Quiroz RN  11/04/21 0409 on elequis/high blood pressure/diabetes

## 2021-11-04 NOTE — ED NOTES
Patient states, his pain is down 2/10. He is ready to go home. Gave wife and patient discharge instructions. He states, understanding.  Patient discharged to home      Heather Watt RN  11/04/21 2885

## 2021-11-04 NOTE — ED NOTES
Patient rolling in bed. Yelling he is having pain. He is asking his wife \" What is this? It hurts so bad. \"     Ayesha Fernandez RN  11/04/21 8064

## 2021-11-04 NOTE — ED NOTES
Patient states, his pain is down to 5/10. He states,\" I still have some core pain\".  Dr. Macy Holstein aware      Wolf Horner RN  11/04/21 5720 Arya Trejo RN  11/04/21 3175

## 2021-11-04 NOTE — ED NOTES
Patient back from CT-scan he is yelling in pain. He rates 10/10. He states, \"Please stop the pain\".       Heather Watt RN  11/04/21 7343

## 2022-04-22 ENCOUNTER — OFFICE VISIT (OUTPATIENT)
Dept: VASCULAR SURGERY | Age: 77
End: 2022-04-22
Payer: MEDICARE

## 2022-04-22 ENCOUNTER — PROCEDURE VISIT (OUTPATIENT)
Dept: SURGERY | Age: 77
End: 2022-04-22
Payer: MEDICARE

## 2022-04-22 VITALS — BODY MASS INDEX: 27.26 KG/M2 | DIASTOLIC BLOOD PRESSURE: 80 MMHG | SYSTOLIC BLOOD PRESSURE: 126 MMHG | WEIGHT: 190 LBS

## 2022-04-22 DIAGNOSIS — I10 HYPERTENSION, UNSPECIFIED TYPE: ICD-10-CM

## 2022-04-22 DIAGNOSIS — I77.1 ILIAC ARTERY STENOSIS, LEFT (HCC): ICD-10-CM

## 2022-04-22 DIAGNOSIS — I71.40 ABDOMINAL AORTIC ANEURYSM (AAA) WITHOUT RUPTURE: Primary | ICD-10-CM

## 2022-04-22 DIAGNOSIS — Z72.0 TOBACCO ABUSE: ICD-10-CM

## 2022-04-22 PROCEDURE — G8417 CALC BMI ABV UP PARAM F/U: HCPCS | Performed by: NURSE PRACTITIONER

## 2022-04-22 PROCEDURE — 93978 VASCULAR STUDY: CPT | Performed by: SURGERY

## 2022-04-22 PROCEDURE — 99214 OFFICE O/P EST MOD 30 MIN: CPT | Performed by: NURSE PRACTITIONER

## 2022-04-22 PROCEDURE — 1123F ACP DISCUSS/DSCN MKR DOCD: CPT | Performed by: NURSE PRACTITIONER

## 2022-04-22 PROCEDURE — G8427 DOCREV CUR MEDS BY ELIG CLIN: HCPCS | Performed by: NURSE PRACTITIONER

## 2022-04-22 PROCEDURE — 4004F PT TOBACCO SCREEN RCVD TLK: CPT | Performed by: NURSE PRACTITIONER

## 2022-04-22 PROCEDURE — 4040F PNEUMOC VAC/ADMIN/RCVD: CPT | Performed by: NURSE PRACTITIONER

## 2022-04-22 ASSESSMENT — ENCOUNTER SYMPTOMS
EYES NEGATIVE: 1
GASTROINTESTINAL NEGATIVE: 1
RESPIRATORY NEGATIVE: 1
ALLERGIC/IMMUNOLOGIC NEGATIVE: 1

## 2022-04-22 NOTE — PATIENT INSTRUCTIONS
PATIENT EDUCATION focused on A&P of aneurysms and strong familial incidence. Patient was informed that smoking and high blood pressure can affect aneurysms. Smoking can thin vessels and high blood pressure results in too much pressure inside the vessel, which can potentially stretch it further. I explained that it is important to make family members aware of the influence of genetic factors in the development of an aneurysm. Return in about 6 months (around 10/22/2022) for AAA scan and office visit .

## 2022-04-22 NOTE — LETTER
[address] [place] [address] [facility], [address] [place] [address] 2010  [address] [address]  Phone: [phone]  Fax: [phone]    Leopoldo Grebe, APRN - CNP        April 22, 2022      Ana Rosa Betancourt, [address] [place] [id number]     Patient: Claudetta Ghazi    MR Number: [id number]    YOB: 1945    Date of Visit: 4/22/2022        Dear Ana Rosa Betancourt:    I saw your patient Fernando Napoles, in the office for surveillance of an abdominal aortic aneurysm. His ultrasound showed a slight increase in the size of his aneurysm (4.85 cm). I have asked the patient to follow up in 6 months with a repeat ultrasound. I will keep you posted regarding this patient's progress.     Sincerely,      Electronically signed by Leopoldo Grebe, APRN - CNP on 4/22/2022 at 3:51 PM    Leopoldo Grebe, APRN - CNP

## 2022-09-13 PROBLEM — I77.1 ILIAC ARTERY STENOSIS, LEFT (HCC): Status: RESOLVED | Noted: 2021-09-16 | Resolved: 2022-09-13

## 2022-10-28 ENCOUNTER — TELEPHONE (OUTPATIENT)
Dept: VASCULAR SURGERY | Age: 77
End: 2022-10-28

## 2022-10-28 ENCOUNTER — PROCEDURE VISIT (OUTPATIENT)
Dept: SURGERY | Age: 77
End: 2022-10-28
Payer: MEDICARE

## 2022-10-28 DIAGNOSIS — I71.40 ABDOMINAL AORTIC ANEURYSM (AAA) WITHOUT RUPTURE, UNSPECIFIED PART: Primary | ICD-10-CM

## 2022-10-28 DIAGNOSIS — I71.43 INFRARENAL ABDOMINAL AORTIC ANEURYSM (AAA) WITHOUT RUPTURE: Primary | ICD-10-CM

## 2022-10-28 PROCEDURE — 93978 VASCULAR STUDY: CPT | Performed by: STUDENT IN AN ORGANIZED HEALTH CARE EDUCATION/TRAINING PROGRAM

## 2022-10-28 NOTE — TELEPHONE ENCOUNTER
Dr. Murali Torres spoke with Mr. Brandt Daniels about his aorta iliac scan. His AAA has enlarged to more than 5.0 cm, the third enlargement in the last year. Mr. Brandt Daniels has a CTA ordered and was given the scheduling number to call for his CTA to be scheduled. Phone call completed.

## 2022-11-17 ENCOUNTER — TELEPHONE (OUTPATIENT)
Dept: SURGERY | Age: 77
End: 2022-11-17

## 2022-11-17 NOTE — TELEPHONE ENCOUNTER
PT is having his CT Scan on 11/19//22 @ 9:30 a.m. at [facility]

## 2022-11-19 ENCOUNTER — HOSPITAL ENCOUNTER (OUTPATIENT)
Dept: CT IMAGING | Age: 77
Discharge: HOME | End: 2022-11-19
Payer: MEDICARE

## 2022-11-19 DIAGNOSIS — I71.40 ABDOMINAL AORTIC ANEURYSM (AAA) WITHOUT RUPTURE, UNSPECIFIED PART: ICD-10-CM

## 2022-11-19 LAB
GFR SERPL CREATININE-BSD FRML MDRD: >60 ML/MIN/{1.73_M2}
PERFORMED ON: NORMAL
POC CREATININE: 1.1 MG/DL (ref 0.8–1.3)
POC SAMPLE TYPE: NORMAL

## 2022-11-19 PROCEDURE — 6360000004 HC RX CONTRAST MEDICATION: Performed by: SURGERY

## 2022-11-19 PROCEDURE — G1010 CDSM STANSON: HCPCS

## 2022-11-19 PROCEDURE — 82565 ASSAY OF CREATININE: CPT

## 2022-11-19 RX ADMIN — IOPAMIDOL 75 ML: 755 INJECTION, SOLUTION INTRAVENOUS at 09:24

## 2022-12-01 ENCOUNTER — OFFICE VISIT (OUTPATIENT)
Dept: VASCULAR SURGERY | Age: 77
End: 2022-12-01
Payer: MEDICARE

## 2022-12-01 VITALS — BODY MASS INDEX: 27.69 KG/M2 | HEIGHT: 70 IN

## 2022-12-01 DIAGNOSIS — I71.40 ABDOMINAL AORTIC ANEURYSM (AAA) GREATER THAN 5.5 CM IN DIAMETER IN MALE: Primary | ICD-10-CM

## 2022-12-01 PROCEDURE — 1123F ACP DISCUSS/DSCN MKR DOCD: CPT | Performed by: STUDENT IN AN ORGANIZED HEALTH CARE EDUCATION/TRAINING PROGRAM

## 2022-12-01 PROCEDURE — G8427 DOCREV CUR MEDS BY ELIG CLIN: HCPCS | Performed by: STUDENT IN AN ORGANIZED HEALTH CARE EDUCATION/TRAINING PROGRAM

## 2022-12-01 PROCEDURE — 99214 OFFICE O/P EST MOD 30 MIN: CPT | Performed by: STUDENT IN AN ORGANIZED HEALTH CARE EDUCATION/TRAINING PROGRAM

## 2022-12-01 PROCEDURE — G8484 FLU IMMUNIZE NO ADMIN: HCPCS | Performed by: STUDENT IN AN ORGANIZED HEALTH CARE EDUCATION/TRAINING PROGRAM

## 2022-12-01 PROCEDURE — 4004F PT TOBACCO SCREEN RCVD TLK: CPT | Performed by: STUDENT IN AN ORGANIZED HEALTH CARE EDUCATION/TRAINING PROGRAM

## 2022-12-01 PROCEDURE — G8417 CALC BMI ABV UP PARAM F/U: HCPCS | Performed by: STUDENT IN AN ORGANIZED HEALTH CARE EDUCATION/TRAINING PROGRAM

## 2022-12-01 NOTE — PROGRESS NOTES
Sofia Moffett (:  1945) is a 68 y.o. male,Established patient, here for evaluation of the following chief complaint(s):  Surgical Consult         ASSESSMENT/PLAN:  1. Abdominal aortic aneurysm (AAA) greater than 5.5 cm in diameter in male    This is a 68year old male patient with enlarging AAA. The CTA was reviewed demonstrating around 5.6 cm AAA. There is a conical neck that likely will be prohibitive for standard endograft use. Will request consultation with Hugo Hernandez for fenestrated endograft. Patient would like to pursue endograft placement. Once cleared by Hugo Hernandez and graft made will plan to schedule surgery. Risks/benefits/alternatives to surgery clearly reviewed with patient and family member present. Discussed also open surgical repair but patient refused that treatment option at this time. All questions answered. Will plan for f-EVAR in the near future. Subjective   SUBJECTIVE/OBJECTIVE:  This is a 68year old male patient who presents to the office today with known AAA that has grown to 5.6 cm in diameter. He is relatively asymptomatic. He has been followed now for 2 years. He denies back pain or abdominal pain. He does smoke. He denies chest pain or shortness of breath. CTA personally reviewed demonstrating the AAA with conical neck that is concerning for length and effective proximal seal.         Objective   Physical Exam  Constitutional:       Appearance: Normal appearance. Cardiovascular:      Rate and Rhythm: Normal rate and regular rhythm. Pulses: Normal pulses. Pulmonary:      Effort: Pulmonary effort is normal. No respiratory distress. Abdominal:      Palpations: Abdomen is soft. Tenderness: There is no abdominal tenderness. Skin:     General: Skin is warm and dry. Neurological:      Mental Status: He is alert.           Crista Breath, DO, FACS, FSVS, [address] [facility]

## 2022-12-02 ENCOUNTER — TELEPHONE (OUTPATIENT)
Dept: VASCULAR SURGERY | Age: 77
End: 2022-12-02

## 2022-12-02 NOTE — TELEPHONE ENCOUNTER
After deliberation, the patient would like to proceed with open repair of the AAA. He appreciated the concise information you provided at the visit. His concerns are with the possibility of an endoleak and needing routine surveillance. He would like to proceed with surgery in January. Discussed January 17th.

## 2022-12-15 ENCOUNTER — OFFICE VISIT (OUTPATIENT)
Dept: VASCULAR SURGERY | Age: 77
End: 2022-12-15
Payer: MEDICARE

## 2022-12-15 VITALS — BODY MASS INDEX: 27.69 KG/M2 | HEIGHT: 70 IN

## 2022-12-15 DIAGNOSIS — I71.43 INFRARENAL ABDOMINAL AORTIC ANEURYSM (AAA) WITHOUT RUPTURE: Primary | ICD-10-CM

## 2022-12-15 PROCEDURE — G8484 FLU IMMUNIZE NO ADMIN: HCPCS | Performed by: STUDENT IN AN ORGANIZED HEALTH CARE EDUCATION/TRAINING PROGRAM

## 2022-12-15 PROCEDURE — 4004F PT TOBACCO SCREEN RCVD TLK: CPT | Performed by: STUDENT IN AN ORGANIZED HEALTH CARE EDUCATION/TRAINING PROGRAM

## 2022-12-15 PROCEDURE — 99214 OFFICE O/P EST MOD 30 MIN: CPT | Performed by: STUDENT IN AN ORGANIZED HEALTH CARE EDUCATION/TRAINING PROGRAM

## 2022-12-15 PROCEDURE — 1123F ACP DISCUSS/DSCN MKR DOCD: CPT | Performed by: STUDENT IN AN ORGANIZED HEALTH CARE EDUCATION/TRAINING PROGRAM

## 2022-12-15 PROCEDURE — G8417 CALC BMI ABV UP PARAM F/U: HCPCS | Performed by: STUDENT IN AN ORGANIZED HEALTH CARE EDUCATION/TRAINING PROGRAM

## 2022-12-15 PROCEDURE — G8427 DOCREV CUR MEDS BY ELIG CLIN: HCPCS | Performed by: STUDENT IN AN ORGANIZED HEALTH CARE EDUCATION/TRAINING PROGRAM

## 2022-12-16 DIAGNOSIS — Z01.810 ENCOUNTER FOR PRE-OPERATIVE CARDIOVASCULAR CLEARANCE: Primary | ICD-10-CM

## 2023-01-03 ENCOUNTER — HOSPITAL ENCOUNTER (OUTPATIENT)
Dept: NON INVASIVE DIAGNOSTICS | Age: 78
Discharge: HOME | End: 2023-01-03
Payer: MEDICARE

## 2023-01-03 DIAGNOSIS — Z01.810 ENCOUNTER FOR PRE-OPERATIVE CARDIOVASCULAR CLEARANCE: ICD-10-CM

## 2023-01-03 PROCEDURE — 6360000002 HC RX W HCPCS: Performed by: STUDENT IN AN ORGANIZED HEALTH CARE EDUCATION/TRAINING PROGRAM

## 2023-01-03 PROCEDURE — 93017 CV STRESS TEST TRACING ONLY: CPT | Performed by: INTERNAL MEDICINE

## 2023-01-03 PROCEDURE — A9502 TC99M TETROFOSMIN: HCPCS | Performed by: STUDENT IN AN ORGANIZED HEALTH CARE EDUCATION/TRAINING PROGRAM

## 2023-01-03 PROCEDURE — 78452 HT MUSCLE IMAGE SPECT MULT: CPT

## 2023-01-03 PROCEDURE — 3430000000 HC RX DIAGNOSTIC RADIOPHARMACEUTICAL: Performed by: STUDENT IN AN ORGANIZED HEALTH CARE EDUCATION/TRAINING PROGRAM

## 2023-01-03 RX ADMIN — TETROFOSMIN 10 MILLICURIE: 1.38 INJECTION, POWDER, LYOPHILIZED, FOR SOLUTION INTRAVENOUS at 12:42

## 2023-01-03 RX ADMIN — TETROFOSMIN 30 MILLICURIE: 1.38 INJECTION, POWDER, LYOPHILIZED, FOR SOLUTION INTRAVENOUS at 13:31

## 2023-01-03 RX ADMIN — REGADENOSON 0.4 MG: 0.08 INJECTION, SOLUTION INTRAVENOUS at 13:40

## 2023-01-03 NOTE — PROGRESS NOTES
Instructed on Lexiscan Stress Test Procedure including possible side effects/ adverse reactions. Patient verbalizes  understanding and denies having any questions . See [address] [place] [address] Cardiology

## 2023-01-10 ENCOUNTER — ANESTHESIA EVENT (OUTPATIENT)
Dept: OPERATING ROOM | Age: 78
End: 2023-01-10
Payer: MEDICARE

## 2023-01-10 PROBLEM — Z01.818 PREOP EXAMINATION: Status: ACTIVE | Noted: 2023-01-10

## 2023-01-12 ENCOUNTER — TELEPHONE (OUTPATIENT)
Dept: SURGERY | Age: 78
End: 2023-01-12

## 2023-01-12 NOTE — TELEPHONE ENCOUNTER
Pt is allergic to Lidocaine. He can tolerate the medication his dentis uses. He had a visit today and he used Mepivacaine Hcl 3%. The patient wanted you to have this information.

## 2023-01-18 NOTE — PROGRESS NOTES
[facility] time _____0600_______        Surgery time _____0730_______    Take the following medications with a sip of water: Follow your MD/Surgeons pre-procedure instructions regarding your medications     Do not eat or drink anything after 12:00 midnight prior to your surgery. This includes water chewing gum, mints and ice chips. You may brush your teeth and gargle the morning of your surgery, but do not swallow the water     Please see your family doctor/pediatrician for a history and physical and/or concerning medications. Bring any test results/reports from your physicians office. If you are under the care of a heart doctor or specialist doctor, please be aware that you may be asked to them for clearance    You may be asked to stop blood thinners such as Coumadin, Plavix, Fragmin, Lovenox, etc., or any anti-inflammatories such as:  Aspirin, Ibuprofen, Advil, Naproxen prior to your surgery. We also ask that you stop any OTC medications such as fish oil, vitamin E, glucosamine, garlic, Multivitamins, COQ 10, etc.    We ask that you do not smoke 24 hours prior to surgery  We ask that you do not  drink any alcoholic beverages 24 hours prior to surgery     You must make arrangements for a responsible adult to take you home after your surgery. For your safety you will not be allowed to leave alone or drive yourself home. Your surgery will be cancelled if you do not have a ride home. Also for your safety, it is strongly suggested that someone stay with you the first 24 hours after your surgery. A parent or legal guardian must accompany a child scheduled for surgery and plan to stay at the hospital until the child is discharged. Please do not bring other children with you. For your comfort, please wear simple loose fitting clothing to the hospital.  Please do not bring valuables.     Do not wear any make-up or nail polish on your fingers or toes      For your safety, please do not wear any jewelry or body piercing's on the day of surgery. All jewelry must be removed. If you have dentures, they will be removed before going to operating room. For your convenience, we will provide you with a container. If you wear contact lenses or glasses, they will be removed, please bring a case for them. If you have a living will and a durable power of  for healthcare, please bring in a copy. As part of our patient safety program to minimize surgical site infections, we ask you to do the following:    Please notify your surgeon if you develop any illness between         now and the  day of your surgery. This includes a cough, cold, fever, sore throat, nausea,         or vomiting, and diarrhea, etc.   Please notify your surgeon if you experience dizziness, shortness         of breath or blurred vision between now and the time of your surgery. Do not shave your operative site 96 hours prior to surgery. For face and neck surgery, men may use an electric razor 48 hours   prior to surgery. You may shower the night before surgery or the morning of   your surgery with an antibacterial soap. You will need to bring a photo ID and insurance card    [facility] has an onsite pharmacy, would you like to utilize our pharmacy     If you will be staying overnight and use a C-pap machine, please bring   your C-pap to hospital     Our goal is to provide you with excellent care, therefore, visitors will be limited to two(2) in the room at a time so that we may focus on providing this care for you. Please contact pre-admission testing if you have any further questions. [facility] phone number:  [phone] PAT fax number:  [phone]  Please note these are generalized instructions for all surgical cases, you may be provided with more specific instructions according to your surgery.     C-Difficile admission screening and protocol:       * Admitted with diarrhea? [] YES    [x]  NO     *Prior history of C-Diff. In last 3 months? [] YES    [x]  NO     *Antibiotic use in the past 6-8 weeks? [x]  NO    []  YES                 If yes, which ANTIBIOTIC AND REASON______     *Prior hospitalization or nursing home in the last month? []  YES    [x]  NO        SAFETY FIRST. .call before you fall

## 2023-01-25 ENCOUNTER — TELEPHONE (OUTPATIENT)
Dept: SURGERY | Age: 78
End: 2023-01-25

## 2023-01-25 NOTE — TELEPHONE ENCOUNTER
Patient would like a call to discuss his medications prior to surgery. AAA on 1-31. He has a \"morning routine\" and needs to know if he has to hold anything. Please call.  Thanks

## 2023-01-25 NOTE — TELEPHONE ENCOUNTER
Per sx letter pt is to hold low dose aspirin 7 days prior to procedure, called pt and advised via VM.

## 2023-01-31 ENCOUNTER — APPOINTMENT (OUTPATIENT)
Dept: GENERAL RADIOLOGY | Age: 78
DRG: 270 | End: 2023-01-31
Attending: STUDENT IN AN ORGANIZED HEALTH CARE EDUCATION/TRAINING PROGRAM
Payer: MEDICARE

## 2023-01-31 ENCOUNTER — HOSPITAL ENCOUNTER (INPATIENT)
Age: 78
LOS: 7 days | Discharge: HOME | DRG: 270 | End: 2023-02-07
Attending: STUDENT IN AN ORGANIZED HEALTH CARE EDUCATION/TRAINING PROGRAM | Admitting: STUDENT IN AN ORGANIZED HEALTH CARE EDUCATION/TRAINING PROGRAM
Payer: MEDICARE

## 2023-01-31 ENCOUNTER — ANESTHESIA (OUTPATIENT)
Dept: OPERATING ROOM | Age: 78
End: 2023-01-31
Payer: MEDICARE

## 2023-01-31 DIAGNOSIS — Z86.79 S/P AAA REPAIR: Primary | ICD-10-CM

## 2023-01-31 DIAGNOSIS — Z98.890 S/P AAA REPAIR: Primary | ICD-10-CM

## 2023-01-31 DIAGNOSIS — I71.43 INFRARENAL ABDOMINAL AORTIC ANEURYSM (AAA) WITHOUT RUPTURE: ICD-10-CM

## 2023-01-31 PROBLEM — I71.40 ABDOMINAL AORTIC ANEURYSM (AAA) GREATER THAN 5.5 CM IN DIAMETER IN MALE (HCC): Status: ACTIVE | Noted: 2023-01-31

## 2023-01-31 LAB
ABO/RH: NORMAL
ANION GAP SERPL CALCULATED.3IONS-SCNC: 13 MMOL/L (ref 3–16)
ANION GAP SERPL CALCULATED.3IONS-SCNC: 9 MMOL/L (ref 3–16)
ANTIBODY SCREEN: NORMAL
APTT: 30 SEC (ref 23–34.3)
APTT: >180 SEC (ref 23–34.3)
BASE EXCESS ARTERIAL: -0.9 MMOL/L (ref -3–3)
BASE EXCESS ARTERIAL: -2 (ref -3–3)
BASE EXCESS ARTERIAL: -4.2 MMOL/L (ref -3–3)
BASE EXCESS ARTERIAL: -4.5 MMOL/L (ref -3–3)
BASE EXCESS ARTERIAL: -6.5 MMOL/L (ref -3–3)
BASE EXCESS ARTERIAL: 3.4 MMOL/L (ref -3–3)
BLOOD BANK DISPENSE STATUS: NORMAL
BLOOD BANK DISPENSE STATUS: NORMAL
BLOOD BANK PRODUCT CODE: NORMAL
BLOOD BANK PRODUCT CODE: NORMAL
BPU ID: NORMAL
BPU ID: NORMAL
BUN BLDV-MCNC: 20 MG/DL (ref 7–20)
BUN BLDV-MCNC: 24 MG/DL (ref 7–20)
CALCIUM SERPL-MCNC: 8.7 MG/DL (ref 8.3–10.6)
CALCIUM SERPL-MCNC: 9.6 MG/DL (ref 8.3–10.6)
CARBOXYHEMOGLOBIN ARTERIAL: 1.3 % (ref 0–1.5)
CARBOXYHEMOGLOBIN ARTERIAL: 1.4 % (ref 0–1.5)
CARBOXYHEMOGLOBIN ARTERIAL: 1.7 % (ref 0–1.5)
CARBOXYHEMOGLOBIN ARTERIAL: 1.9 % (ref 0–1.5)
CARBOXYHEMOGLOBIN ARTERIAL: 2.1 % (ref 0–1.5)
CHLORIDE BLD-SCNC: 106 MMOL/L (ref 99–110)
CHLORIDE BLD-SCNC: 113 MMOL/L (ref 99–110)
CO2: 23 MMOL/L (ref 21–32)
CO2: 26 MMOL/L (ref 21–32)
CREAT SERPL-MCNC: 1.2 MG/DL (ref 0.8–1.3)
CREAT SERPL-MCNC: 1.4 MG/DL (ref 0.8–1.3)
DESCRIPTION BLOOD BANK: NORMAL
DESCRIPTION BLOOD BANK: NORMAL
FIBRINOGEN: 141 MG/DL (ref 207–509)
FIBRINOGEN: 203 MG/DL (ref 207–509)
GFR SERPL CREATININE-BSD FRML MDRD: 52 ML/MIN/{1.73_M2}
GFR SERPL CREATININE-BSD FRML MDRD: >60 ML/MIN/{1.73_M2}
GLUCOSE BLD-MCNC: 105 MG/DL (ref 70–99)
GLUCOSE BLD-MCNC: 231 MG/DL (ref 70–99)
HCO3 ARTERIAL: 20 MMOL/L (ref 21–29)
HCO3 ARTERIAL: 22.7 MMOL/L (ref 21–29)
HCO3 ARTERIAL: 22.9 MMOL/L (ref 21–29)
HCO3 ARTERIAL: 24.2 MMOL/L (ref 21–29)
HCO3 ARTERIAL: 25 MMOL/L (ref 21–29)
HCO3 ARTERIAL: 29 MMOL/L (ref 21–29)
HCT VFR BLD CALC: 28.8 % (ref 40.5–52.5)
HCT VFR BLD CALC: 32 % (ref 40.5–52.5)
HCT VFR BLD CALC: 35.8 % (ref 40.5–52.5)
HCT VFR BLD CALC: 42.8 % (ref 40.5–52.5)
HEMOGLOBIN, ART, EXTENDED: 10.6 G/DL (ref 13.5–17.5)
HEMOGLOBIN, ART, EXTENDED: 10.6 G/DL (ref 13.5–17.5)
HEMOGLOBIN, ART, EXTENDED: 10.7 G/DL (ref 13.5–17.5)
HEMOGLOBIN, ART, EXTENDED: 10.9 G/DL (ref 13.5–17.5)
HEMOGLOBIN, ART, EXTENDED: 13.7 G/DL (ref 13.5–17.5)
HEMOGLOBIN: 10.1 G/DL (ref 13.5–17.5)
HEMOGLOBIN: 11.7 G/DL (ref 13.5–17.5)
HEMOGLOBIN: 14.1 G/DL (ref 13.5–17.5)
HEMOGLOBIN: 9.1 G/DL (ref 13.5–17.5)
INR BLD: 1.42 (ref 0.87–1.14)
INR BLD: 1.75 (ref 0.87–1.14)
MCH RBC QN AUTO: 29.1 PG (ref 26–34)
MCH RBC QN AUTO: 30.1 PG (ref 26–34)
MCH RBC QN AUTO: 30.1 PG (ref 26–34)
MCHC RBC AUTO-ENTMCNC: 31.5 G/DL (ref 31–36)
MCHC RBC AUTO-ENTMCNC: 32.8 G/DL (ref 31–36)
MCHC RBC AUTO-ENTMCNC: 33 G/DL (ref 31–36)
MCV RBC AUTO: 91.2 FL (ref 80–100)
MCV RBC AUTO: 91.7 FL (ref 80–100)
MCV RBC AUTO: 92.4 FL (ref 80–100)
METHEMOGLOBIN ARTERIAL: 0.6 %
METHEMOGLOBIN ARTERIAL: 0.6 %
METHEMOGLOBIN ARTERIAL: 0.7 %
METHEMOGLOBIN ARTERIAL: 0.8 %
METHEMOGLOBIN ARTERIAL: 0.8 %
O2 SAT, ARTERIAL: 96 % (ref 93–100)
O2 SAT, ARTERIAL: >100 %
O2 THERAPY: ABNORMAL
PCO2 ARTERIAL: 43.4 MMHG (ref 35–45)
PCO2 ARTERIAL: 44.8 MMHG (ref 35–45)
PCO2 ARTERIAL: 47.3 MMHG (ref 35–45)
PCO2 ARTERIAL: 47.6 MM HG (ref 35–45)
PCO2 ARTERIAL: 50.1 MMHG (ref 35–45)
PCO2 ARTERIAL: 50.5 MMHG (ref 35–45)
PDW BLD-RTO: 15.5 % (ref 12.4–15.4)
PDW BLD-RTO: 15.6 % (ref 12.4–15.4)
PDW BLD-RTO: 15.7 % (ref 12.4–15.4)
PERFORMED ON: ABNORMAL
PH ARTERIAL: 7.26 (ref 7.35–7.45)
PH ARTERIAL: 7.27 (ref 7.35–7.45)
PH ARTERIAL: 7.27 (ref 7.35–7.45)
PH ARTERIAL: 7.31 (ref 7.35–7.45)
PH ARTERIAL: 7.35 (ref 7.35–7.45)
PH ARTERIAL: 7.39 (ref 7.35–7.45)
PLATELET # BLD: 174 K/UL (ref 135–450)
PLATELET # BLD: 178 K/UL (ref 135–450)
PLATELET # BLD: 92 K/UL (ref 135–450)
PLATELET SLIDE REVIEW: ABNORMAL
PMV BLD AUTO: 8 FL (ref 5–10.5)
PMV BLD AUTO: 8 FL (ref 5–10.5)
PMV BLD AUTO: 8.3 FL (ref 5–10.5)
PO2 ARTERIAL: 244 MMHG (ref 75–108)
PO2 ARTERIAL: 259 MMHG (ref 75–108)
PO2 ARTERIAL: 269 MMHG (ref 75–108)
PO2 ARTERIAL: 280 MMHG (ref 75–108)
PO2 ARTERIAL: 298 MMHG (ref 75–108)
PO2 ARTERIAL: 86.4 MM HG (ref 75–108)
POC SAMPLE TYPE: ABNORMAL
POTASSIUM SERPL-SCNC: 4.2 MMOL/L (ref 3.5–5.1)
POTASSIUM SERPL-SCNC: 4.3 MMOL/L (ref 3.5–5.1)
PROTHROMBIN TIME: 17.3 SEC (ref 11.7–14.5)
PROTHROMBIN TIME: 20.4 SEC (ref 11.7–14.5)
RBC # BLD: 3.46 M/UL (ref 4.2–5.9)
RBC # BLD: 3.9 M/UL (ref 4.2–5.9)
RBC # BLD: 4.69 M/UL (ref 4.2–5.9)
SLIDE REVIEW: ABNORMAL
SODIUM BLD-SCNC: 141 MMOL/L (ref 136–145)
SODIUM BLD-SCNC: 149 MMOL/L (ref 136–145)
TCO2 ARTERIAL: 21.4 MMOL/L
TCO2 ARTERIAL: 24.3 MMOL/L
TCO2 ARTERIAL: 24.4 MMOL/L
TCO2 ARTERIAL: 26 MMOL/L
TCO2 ARTERIAL: 26.4 MMOL/L
TCO2 ARTERIAL: 30.4 MMOL/L
WBC # BLD: 14.9 K/UL (ref 4–11)
WBC # BLD: 18.8 K/UL (ref 4–11)
WBC # BLD: 7.5 K/UL (ref 4–11)

## 2023-01-31 PROCEDURE — 2500000003 HC RX 250 WO HCPCS

## 2023-01-31 PROCEDURE — 3700000000 HC ANESTHESIA ATTENDED CARE: Performed by: STUDENT IN AN ORGANIZED HEALTH CARE EDUCATION/TRAINING PROGRAM

## 2023-01-31 PROCEDURE — P9012 CRYOPRECIPITATE EACH UNIT: HCPCS

## 2023-01-31 PROCEDURE — 6360000002 HC RX W HCPCS: Performed by: STUDENT IN AN ORGANIZED HEALTH CARE EDUCATION/TRAINING PROGRAM

## 2023-01-31 PROCEDURE — 2709999900 HC NON-CHARGEABLE SUPPLY: Performed by: STUDENT IN AN ORGANIZED HEALTH CARE EDUCATION/TRAINING PROGRAM

## 2023-01-31 PROCEDURE — 3600000015 HC SURGERY LEVEL 5 ADDTL 15MIN: Performed by: STUDENT IN AN ORGANIZED HEALTH CARE EDUCATION/TRAINING PROGRAM

## 2023-01-31 PROCEDURE — 02HV33Z INSERTION OF INFUSION DEVICE INTO SUPERIOR VENA CAVA, PERCUTANEOUS APPROACH: ICD-10-PCS | Performed by: ANESTHESIOLOGY

## 2023-01-31 PROCEDURE — 2580000003 HC RX 258: Performed by: ANESTHESIOLOGY

## 2023-01-31 PROCEDURE — 36430 TRANSFUSION BLD/BLD COMPNT: CPT

## 2023-01-31 PROCEDURE — 94761 N-INVAS EAR/PLS OXIMETRY MLT: CPT

## 2023-01-31 PROCEDURE — 6360000002 HC RX W HCPCS: Performed by: ANESTHESIOLOGY

## 2023-01-31 PROCEDURE — 3E0T3BZ INTRODUCTION OF ANESTHETIC AGENT INTO PERIPHERAL NERVES AND PLEXI, PERCUTANEOUS APPROACH: ICD-10-PCS | Performed by: ANESTHESIOLOGY

## 2023-01-31 PROCEDURE — P9045 ALBUMIN (HUMAN), 5%, 250 ML: HCPCS

## 2023-01-31 PROCEDURE — 2580000003 HC RX 258: Performed by: STUDENT IN AN ORGANIZED HEALTH CARE EDUCATION/TRAINING PROGRAM

## 2023-01-31 PROCEDURE — 3700000001 HC ADD 15 MINUTES (ANESTHESIA): Performed by: STUDENT IN AN ORGANIZED HEALTH CARE EDUCATION/TRAINING PROGRAM

## 2023-01-31 PROCEDURE — L8670 VASCULAR GRAFT, SYNTHETIC: HCPCS | Performed by: STUDENT IN AN ORGANIZED HEALTH CARE EDUCATION/TRAINING PROGRAM

## 2023-01-31 PROCEDURE — 82803 BLOOD GASES ANY COMBINATION: CPT

## 2023-01-31 PROCEDURE — C1713 ANCHOR/SCREW BN/BN,TIS/BN: HCPCS | Performed by: STUDENT IN AN ORGANIZED HEALTH CARE EDUCATION/TRAINING PROGRAM

## 2023-01-31 PROCEDURE — 71045 X-RAY EXAM CHEST 1 VIEW: CPT

## 2023-01-31 PROCEDURE — 7100000001 HC PACU RECOVERY - ADDTL 15 MIN

## 2023-01-31 PROCEDURE — 85730 THROMBOPLASTIN TIME PARTIAL: CPT

## 2023-01-31 PROCEDURE — C1768 GRAFT, VASCULAR: HCPCS | Performed by: STUDENT IN AN ORGANIZED HEALTH CARE EDUCATION/TRAINING PROGRAM

## 2023-01-31 PROCEDURE — 04V00DZ RESTRICTION OF ABDOMINAL AORTA WITH INTRALUMINAL DEVICE, OPEN APPROACH: ICD-10-PCS | Performed by: STUDENT IN AN ORGANIZED HEALTH CARE EDUCATION/TRAINING PROGRAM

## 2023-01-31 PROCEDURE — 2580000003 HC RX 258

## 2023-01-31 PROCEDURE — 35081 REPAIR DEFECT OF ARTERY: CPT | Performed by: STUDENT IN AN ORGANIZED HEALTH CARE EDUCATION/TRAINING PROGRAM

## 2023-01-31 PROCEDURE — 85610 PROTHROMBIN TIME: CPT

## 2023-01-31 PROCEDURE — 86850 RBC ANTIBODY SCREEN: CPT

## 2023-01-31 PROCEDURE — 86901 BLOOD TYPING SEROLOGIC RH(D): CPT

## 2023-01-31 PROCEDURE — P9047 ALBUMIN (HUMAN), 25%, 50ML: HCPCS | Performed by: STUDENT IN AN ORGANIZED HEALTH CARE EDUCATION/TRAINING PROGRAM

## 2023-01-31 PROCEDURE — 85018 HEMOGLOBIN: CPT

## 2023-01-31 PROCEDURE — 6360000002 HC RX W HCPCS

## 2023-01-31 PROCEDURE — 2100000000 HC CCU R&B

## 2023-01-31 PROCEDURE — 3600000005 HC SURGERY LEVEL 5 BASE: Performed by: STUDENT IN AN ORGANIZED HEALTH CARE EDUCATION/TRAINING PROGRAM

## 2023-01-31 PROCEDURE — 80048 BASIC METABOLIC PNL TOTAL CA: CPT

## 2023-01-31 PROCEDURE — 86900 BLOOD TYPING SEROLOGIC ABO: CPT

## 2023-01-31 PROCEDURE — P9035 PLATELET PHERES LEUKOREDUCED: HCPCS

## 2023-01-31 PROCEDURE — 7100000000 HC PACU RECOVERY - FIRST 15 MIN

## 2023-01-31 PROCEDURE — 85027 COMPLETE CBC AUTOMATED: CPT

## 2023-01-31 PROCEDURE — 85014 HEMATOCRIT: CPT

## 2023-01-31 PROCEDURE — 2700000000 HC OXYGEN THERAPY PER DAY

## 2023-01-31 PROCEDURE — 2720000010 HC SURG SUPPLY STERILE: Performed by: STUDENT IN AN ORGANIZED HEALTH CARE EDUCATION/TRAINING PROGRAM

## 2023-01-31 PROCEDURE — 64486 TAP BLOCK UNIL BY INJECTION: CPT | Performed by: ANESTHESIOLOGY

## 2023-01-31 PROCEDURE — 36415 COLL VENOUS BLD VENIPUNCTURE: CPT

## 2023-01-31 PROCEDURE — P9046 ALBUMIN (HUMAN), 25%, 20 ML: HCPCS | Performed by: STUDENT IN AN ORGANIZED HEALTH CARE EDUCATION/TRAINING PROGRAM

## 2023-01-31 PROCEDURE — 85384 FIBRINOGEN ACTIVITY: CPT

## 2023-01-31 PROCEDURE — A4217 STERILE WATER/SALINE, 500 ML: HCPCS | Performed by: STUDENT IN AN ORGANIZED HEALTH CARE EDUCATION/TRAINING PROGRAM

## 2023-01-31 DEVICE — ALBOGRAFT POLYESTER VASCULAR GRAFT 30CMX22MM
Type: IMPLANTABLE DEVICE | Site: ABDOMEN | Status: FUNCTIONAL
Brand: ALBOGRAFT POLYESTER VASCULAR GRAFT

## 2023-01-31 RX ORDER — ALBUMIN (HUMAN) 12.5 G/50ML
25 SOLUTION INTRAVENOUS EVERY 6 HOURS PRN
Status: DISCONTINUED | OUTPATIENT
Start: 2023-01-31 | End: 2023-01-31

## 2023-01-31 RX ORDER — METOPROLOL TARTRATE 5 MG/5ML
2.5 INJECTION INTRAVENOUS EVERY 6 HOURS PRN
Status: DISCONTINUED | OUTPATIENT
Start: 2023-01-31 | End: 2023-02-02

## 2023-01-31 RX ORDER — ROCURONIUM BROMIDE 10 MG/ML
INJECTION, SOLUTION INTRAVENOUS PRN
Status: DISCONTINUED | OUTPATIENT
Start: 2023-01-31 | End: 2023-01-31 | Stop reason: SDUPTHER

## 2023-01-31 RX ORDER — SODIUM CHLORIDE 9 MG/ML
INJECTION, SOLUTION INTRAVENOUS CONTINUOUS
Status: DISCONTINUED | OUTPATIENT
Start: 2023-01-31 | End: 2023-02-01

## 2023-01-31 RX ORDER — ONDANSETRON 2 MG/ML
4 INJECTION INTRAMUSCULAR; INTRAVENOUS EVERY 6 HOURS PRN
Status: DISCONTINUED | OUTPATIENT
Start: 2023-01-31 | End: 2023-02-07 | Stop reason: HOSPADM

## 2023-01-31 RX ORDER — ALBUMIN (HUMAN) 12.5 G/50ML
25 SOLUTION INTRAVENOUS ONCE
Status: COMPLETED | OUTPATIENT
Start: 2023-01-31 | End: 2023-01-31

## 2023-01-31 RX ORDER — SODIUM CHLORIDE 0.9 % (FLUSH) 0.9 %
5-40 SYRINGE (ML) INJECTION PRN
Status: DISCONTINUED | OUTPATIENT
Start: 2023-01-31 | End: 2023-01-31 | Stop reason: SDUPTHER

## 2023-01-31 RX ORDER — LIDOCAINE HYDROCHLORIDE 20 MG/ML
INJECTION, SOLUTION EPIDURAL; INFILTRATION; INTRACAUDAL; PERINEURAL PRN
Status: DISCONTINUED | OUTPATIENT
Start: 2023-01-31 | End: 2023-01-31 | Stop reason: SDUPTHER

## 2023-01-31 RX ORDER — FENTANYL CITRATE 50 UG/ML
25 INJECTION, SOLUTION INTRAMUSCULAR; INTRAVENOUS EVERY 5 MIN PRN
Status: DISCONTINUED | OUTPATIENT
Start: 2023-01-31 | End: 2023-01-31

## 2023-01-31 RX ORDER — MANNITOL 250 MG/ML
INJECTION, SOLUTION INTRAVENOUS PRN
Status: DISCONTINUED | OUTPATIENT
Start: 2023-01-31 | End: 2023-01-31 | Stop reason: SDUPTHER

## 2023-01-31 RX ORDER — SODIUM CHLORIDE 9 MG/ML
INJECTION, SOLUTION INTRAVENOUS PRN
Status: DISCONTINUED | OUTPATIENT
Start: 2023-01-31 | End: 2023-01-31 | Stop reason: HOSPADM

## 2023-01-31 RX ORDER — SODIUM CHLORIDE 9 MG/ML
INJECTION, SOLUTION INTRAVENOUS PRN
Status: DISCONTINUED | OUTPATIENT
Start: 2023-01-31 | End: 2023-01-31

## 2023-01-31 RX ORDER — SODIUM CHLORIDE 0.9 % (FLUSH) 0.9 %
5-40 SYRINGE (ML) INJECTION PRN
Status: DISCONTINUED | OUTPATIENT
Start: 2023-01-31 | End: 2023-01-31

## 2023-01-31 RX ORDER — ONDANSETRON 4 MG/1
4 TABLET, ORALLY DISINTEGRATING ORAL EVERY 8 HOURS PRN
Status: DISCONTINUED | OUTPATIENT
Start: 2023-01-31 | End: 2023-02-07 | Stop reason: HOSPADM

## 2023-01-31 RX ORDER — SODIUM CHLORIDE 9 MG/ML
INJECTION, SOLUTION INTRAVENOUS CONTINUOUS PRN
Status: DISCONTINUED | OUTPATIENT
Start: 2023-01-31 | End: 2023-01-31 | Stop reason: SDUPTHER

## 2023-01-31 RX ORDER — MEPERIDINE HYDROCHLORIDE 50 MG/ML
12.5 INJECTION INTRAMUSCULAR; INTRAVENOUS; SUBCUTANEOUS EVERY 5 MIN PRN
Status: DISCONTINUED | OUTPATIENT
Start: 2023-01-31 | End: 2023-01-31

## 2023-01-31 RX ORDER — BISACODYL 10 MG
10 SUPPOSITORY, RECTAL RECTAL DAILY PRN
Status: DISCONTINUED | OUTPATIENT
Start: 2023-01-31 | End: 2023-02-01

## 2023-01-31 RX ORDER — KETAMINE HCL IN NACL, ISO-OSM 100MG/10ML
SYRINGE (ML) INJECTION PRN
Status: DISCONTINUED | OUTPATIENT
Start: 2023-01-31 | End: 2023-01-31 | Stop reason: SDUPTHER

## 2023-01-31 RX ORDER — FENTANYL CITRATE 50 UG/ML
INJECTION, SOLUTION INTRAMUSCULAR; INTRAVENOUS PRN
Status: DISCONTINUED | OUTPATIENT
Start: 2023-01-31 | End: 2023-01-31 | Stop reason: SDUPTHER

## 2023-01-31 RX ORDER — SODIUM CHLORIDE 0.9 % (FLUSH) 0.9 %
5-40 SYRINGE (ML) INJECTION PRN
Status: DISCONTINUED | OUTPATIENT
Start: 2023-01-31 | End: 2023-02-07 | Stop reason: HOSPADM

## 2023-01-31 RX ORDER — HEPARIN SODIUM 1000 [USP'U]/ML
INJECTION, SOLUTION INTRAVENOUS; SUBCUTANEOUS PRN
Status: DISCONTINUED | OUTPATIENT
Start: 2023-01-31 | End: 2023-01-31 | Stop reason: SDUPTHER

## 2023-01-31 RX ORDER — DEXAMETHASONE SODIUM PHOSPHATE 4 MG/ML
INJECTION, SOLUTION INTRA-ARTICULAR; INTRALESIONAL; INTRAMUSCULAR; INTRAVENOUS; SOFT TISSUE PRN
Status: DISCONTINUED | OUTPATIENT
Start: 2023-01-31 | End: 2023-01-31 | Stop reason: SDUPTHER

## 2023-01-31 RX ORDER — ROPIVACAINE HYDROCHLORIDE 5 MG/ML
INJECTION, SOLUTION EPIDURAL; INFILTRATION; PERINEURAL
Status: COMPLETED | OUTPATIENT
Start: 2023-01-31 | End: 2023-01-31

## 2023-01-31 RX ORDER — ONDANSETRON 2 MG/ML
INJECTION INTRAMUSCULAR; INTRAVENOUS PRN
Status: DISCONTINUED | OUTPATIENT
Start: 2023-01-31 | End: 2023-01-31 | Stop reason: SDUPTHER

## 2023-01-31 RX ORDER — METOPROLOL TARTRATE 5 MG/5ML
5 INJECTION INTRAVENOUS EVERY 6 HOURS PRN
Status: DISCONTINUED | OUTPATIENT
Start: 2023-01-31 | End: 2023-01-31

## 2023-01-31 RX ORDER — SODIUM CHLORIDE 9 MG/ML
INJECTION, SOLUTION INTRAVENOUS PRN
Status: DISCONTINUED | OUTPATIENT
Start: 2023-01-31 | End: 2023-01-31 | Stop reason: SDUPTHER

## 2023-01-31 RX ORDER — SODIUM CHLORIDE 0.9 % (FLUSH) 0.9 %
5-40 SYRINGE (ML) INJECTION EVERY 12 HOURS SCHEDULED
Status: DISCONTINUED | OUTPATIENT
Start: 2023-01-31 | End: 2023-01-31 | Stop reason: HOSPADM

## 2023-01-31 RX ORDER — EPHEDRINE SULFATE 50 MG/ML
INJECTION INTRAVENOUS PRN
Status: DISCONTINUED | OUTPATIENT
Start: 2023-01-31 | End: 2023-01-31 | Stop reason: SDUPTHER

## 2023-01-31 RX ORDER — NITROGLYCERIN 20 MG/100ML
INJECTION INTRAVENOUS PRN
Status: DISCONTINUED | OUTPATIENT
Start: 2023-01-31 | End: 2023-01-31 | Stop reason: SDUPTHER

## 2023-01-31 RX ORDER — SODIUM CHLORIDE 0.9 % (FLUSH) 0.9 %
5-40 SYRINGE (ML) INJECTION EVERY 12 HOURS SCHEDULED
Status: DISCONTINUED | OUTPATIENT
Start: 2023-01-31 | End: 2023-02-07 | Stop reason: HOSPADM

## 2023-01-31 RX ORDER — PROTAMINE SULFATE 10 MG/ML
INJECTION, SOLUTION INTRAVENOUS PRN
Status: DISCONTINUED | OUTPATIENT
Start: 2023-01-31 | End: 2023-01-31 | Stop reason: SDUPTHER

## 2023-01-31 RX ORDER — ONDANSETRON 2 MG/ML
4 INJECTION INTRAMUSCULAR; INTRAVENOUS
Status: COMPLETED | OUTPATIENT
Start: 2023-01-31 | End: 2023-01-31

## 2023-01-31 RX ORDER — LEVOTHYROXINE SODIUM 20 UG/ML
50 INJECTION, SOLUTION INTRAVENOUS DAILY
Status: DISCONTINUED | OUTPATIENT
Start: 2023-02-04 | End: 2023-02-05

## 2023-01-31 RX ORDER — ESMOLOL HYDROCHLORIDE 10 MG/ML
INJECTION INTRAVENOUS PRN
Status: DISCONTINUED | OUTPATIENT
Start: 2023-01-31 | End: 2023-01-31 | Stop reason: SDUPTHER

## 2023-01-31 RX ORDER — FENTANYL CITRATE 50 UG/ML
50 INJECTION, SOLUTION INTRAMUSCULAR; INTRAVENOUS EVERY 5 MIN PRN
Status: COMPLETED | OUTPATIENT
Start: 2023-01-31 | End: 2023-01-31

## 2023-01-31 RX ORDER — 0.9 % SODIUM CHLORIDE 0.9 %
1000 INTRAVENOUS SOLUTION INTRAVENOUS ONCE
Status: COMPLETED | OUTPATIENT
Start: 2023-01-31 | End: 2023-01-31

## 2023-01-31 RX ORDER — MIDAZOLAM HYDROCHLORIDE 1 MG/ML
INJECTION INTRAMUSCULAR; INTRAVENOUS PRN
Status: DISCONTINUED | OUTPATIENT
Start: 2023-01-31 | End: 2023-01-31 | Stop reason: SDUPTHER

## 2023-01-31 RX ORDER — SODIUM CHLORIDE 0.9 % (FLUSH) 0.9 %
5-40 SYRINGE (ML) INJECTION EVERY 12 HOURS SCHEDULED
Status: DISCONTINUED | OUTPATIENT
Start: 2023-01-31 | End: 2023-01-31

## 2023-01-31 RX ORDER — SUCCINYLCHOLINE/SOD CL,ISO/PF 200MG/10ML
SYRINGE (ML) INTRAVENOUS PRN
Status: DISCONTINUED | OUTPATIENT
Start: 2023-01-31 | End: 2023-01-31 | Stop reason: SDUPTHER

## 2023-01-31 RX ORDER — SODIUM CHLORIDE 9 MG/ML
INJECTION, SOLUTION INTRAVENOUS PRN
Status: DISCONTINUED | OUTPATIENT
Start: 2023-01-31 | End: 2023-02-07 | Stop reason: HOSPADM

## 2023-01-31 RX ORDER — GLYCOPYRROLATE 0.2 MG/ML
INJECTION INTRAMUSCULAR; INTRAVENOUS PRN
Status: DISCONTINUED | OUTPATIENT
Start: 2023-01-31 | End: 2023-01-31 | Stop reason: SDUPTHER

## 2023-01-31 RX ORDER — PROPOFOL 10 MG/ML
INJECTION, EMULSION INTRAVENOUS PRN
Status: DISCONTINUED | OUTPATIENT
Start: 2023-01-31 | End: 2023-01-31 | Stop reason: SDUPTHER

## 2023-01-31 RX ORDER — SODIUM CHLORIDE 9 MG/ML
INJECTION, SOLUTION INTRAVENOUS PRN
Status: DISCONTINUED | OUTPATIENT
Start: 2023-01-31 | End: 2023-02-03

## 2023-01-31 RX ORDER — SODIUM CHLORIDE 0.9 % (FLUSH) 0.9 %
5-40 SYRINGE (ML) INJECTION EVERY 12 HOURS SCHEDULED
Status: DISCONTINUED | OUTPATIENT
Start: 2023-01-31 | End: 2023-01-31 | Stop reason: SDUPTHER

## 2023-01-31 RX ORDER — NALOXONE HYDROCHLORIDE 0.4 MG/ML
INJECTION, SOLUTION INTRAMUSCULAR; INTRAVENOUS; SUBCUTANEOUS PRN
Status: DISCONTINUED | OUTPATIENT
Start: 2023-01-31 | End: 2023-02-01

## 2023-01-31 RX ORDER — ALBUMIN, HUMAN INJ 5% 5 %
SOLUTION INTRAVENOUS PRN
Status: DISCONTINUED | OUTPATIENT
Start: 2023-01-31 | End: 2023-01-31 | Stop reason: SDUPTHER

## 2023-01-31 RX ORDER — PHENYLEPHRINE HCL IN 0.9% NACL 1 MG/10 ML
SYRINGE (ML) INTRAVENOUS PRN
Status: DISCONTINUED | OUTPATIENT
Start: 2023-01-31 | End: 2023-01-31 | Stop reason: SDUPTHER

## 2023-01-31 RX ORDER — CALCIUM CHLORIDE 100 MG/ML
INJECTION INTRAVENOUS; INTRAVENTRICULAR PRN
Status: DISCONTINUED | OUTPATIENT
Start: 2023-01-31 | End: 2023-01-31 | Stop reason: SDUPTHER

## 2023-01-31 RX ORDER — SODIUM CHLORIDE 0.9 % (FLUSH) 0.9 %
5-40 SYRINGE (ML) INJECTION PRN
Status: DISCONTINUED | OUTPATIENT
Start: 2023-01-31 | End: 2023-01-31 | Stop reason: HOSPADM

## 2023-01-31 RX ADMIN — Medication 50 MEQ: at 10:37

## 2023-01-31 RX ADMIN — Medication 100 MCG: at 11:29

## 2023-01-31 RX ADMIN — PROPOFOL 150 MG: 10 INJECTION, EMULSION INTRAVENOUS at 08:13

## 2023-01-31 RX ADMIN — FENTANYL CITRATE: 50 INJECTION, SOLUTION INTRAMUSCULAR; INTRAVENOUS at 14:26

## 2023-01-31 RX ADMIN — SODIUM CHLORIDE: 9 INJECTION, SOLUTION INTRAVENOUS at 06:21

## 2023-01-31 RX ADMIN — CEFAZOLIN 2000 MG: 2 INJECTION, POWDER, FOR SOLUTION INTRAMUSCULAR; INTRAVENOUS at 08:16

## 2023-01-31 RX ADMIN — DEXAMETHASONE SODIUM PHOSPHATE 8 MG: 4 INJECTION, SOLUTION INTRAMUSCULAR; INTRAVENOUS at 09:16

## 2023-01-31 RX ADMIN — SODIUM CHLORIDE: 9 INJECTION, SOLUTION INTRAVENOUS at 08:13

## 2023-01-31 RX ADMIN — MIDAZOLAM 1 MG: 1 INJECTION INTRAMUSCULAR; INTRAVENOUS at 07:15

## 2023-01-31 RX ADMIN — ALBUMIN (HUMAN) 250 ML: 12.5 INJECTION, SOLUTION INTRAVENOUS at 09:26

## 2023-01-31 RX ADMIN — Medication 50 MEQ: at 10:10

## 2023-01-31 RX ADMIN — FENTANYL CITRATE 50 MCG: 50 INJECTION, SOLUTION INTRAMUSCULAR; INTRAVENOUS at 14:22

## 2023-01-31 RX ADMIN — FENTANYL CITRATE 50 MCG: 50 INJECTION INTRAMUSCULAR; INTRAVENOUS at 07:26

## 2023-01-31 RX ADMIN — FENTANYL CITRATE 100 MCG: 50 INJECTION INTRAMUSCULAR; INTRAVENOUS at 09:49

## 2023-01-31 RX ADMIN — Medication 30 MG: at 10:58

## 2023-01-31 RX ADMIN — FENTANYL CITRATE 50 MCG: 50 INJECTION, SOLUTION INTRAMUSCULAR; INTRAVENOUS at 13:36

## 2023-01-31 RX ADMIN — Medication 200 MCG: at 12:21

## 2023-01-31 RX ADMIN — HEPARIN SODIUM 5000 UNITS: 1000 INJECTION INTRAVENOUS; SUBCUTANEOUS at 09:31

## 2023-01-31 RX ADMIN — ALBUMIN (HUMAN) 12.5 G: 12.5 INJECTION, SOLUTION INTRAVENOUS at 10:31

## 2023-01-31 RX ADMIN — FENTANYL CITRATE 50 MCG: 50 INJECTION, SOLUTION INTRAMUSCULAR; INTRAVENOUS at 14:15

## 2023-01-31 RX ADMIN — Medication 200 MCG: at 09:41

## 2023-01-31 RX ADMIN — SODIUM CHLORIDE: 9 INJECTION, SOLUTION INTRAVENOUS at 11:59

## 2023-01-31 RX ADMIN — Medication 140 MG: at 08:12

## 2023-01-31 RX ADMIN — SODIUM CHLORIDE: 9 INJECTION, SOLUTION INTRAVENOUS at 10:57

## 2023-01-31 RX ADMIN — Medication 10 MG: at 07:46

## 2023-01-31 RX ADMIN — ROPIVACAINE HYDROCHLORIDE 20 ML: 5 INJECTION, SOLUTION EPIDURAL; INFILTRATION; PERINEURAL at 07:40

## 2023-01-31 RX ADMIN — Medication 200 MCG: at 09:43

## 2023-01-31 RX ADMIN — ONDANSETRON 4 MG: 2 INJECTION INTRAMUSCULAR; INTRAVENOUS at 13:39

## 2023-01-31 RX ADMIN — MANNITOL 12.5 G: 250 INJECTION, SOLUTION INTRAVENOUS at 09:32

## 2023-01-31 RX ADMIN — ROPIVACAINE HYDROCHLORIDE 20 ML: 5 INJECTION, SOLUTION EPIDURAL; INFILTRATION; PERINEURAL at 07:49

## 2023-01-31 RX ADMIN — PHENYLEPHRINE HYDROCHLORIDE 25 MCG/MIN: 10 INJECTION INTRAVENOUS at 08:40

## 2023-01-31 RX ADMIN — MIDAZOLAM 2 MG: 1 INJECTION INTRAMUSCULAR; INTRAVENOUS at 10:58

## 2023-01-31 RX ADMIN — ROCURONIUM BROMIDE 60 MG: 10 INJECTION INTRAVENOUS at 08:17

## 2023-01-31 RX ADMIN — Medication 300 MCG: at 10:54

## 2023-01-31 RX ADMIN — CALCIUM CHLORIDE 0.5 G: 100 INJECTION, SOLUTION INTRAVENOUS; INTRAVENTRICULAR at 10:53

## 2023-01-31 RX ADMIN — PHYTONADIONE 5 MG: 10 INJECTION, EMULSION INTRAMUSCULAR; INTRAVENOUS; SUBCUTANEOUS at 19:40

## 2023-01-31 RX ADMIN — EPHEDRINE SULFATE 10 MG: 50 INJECTION INTRAVENOUS at 12:26

## 2023-01-31 RX ADMIN — Medication 50 MEQ: at 12:07

## 2023-01-31 RX ADMIN — ALBUMIN (HUMAN) 250 ML: 12.5 INJECTION, SOLUTION INTRAVENOUS at 10:50

## 2023-01-31 RX ADMIN — EPHEDRINE SULFATE 10 MG: 50 INJECTION INTRAVENOUS at 09:42

## 2023-01-31 RX ADMIN — ROCURONIUM BROMIDE 30 MG: 10 INJECTION INTRAVENOUS at 10:07

## 2023-01-31 RX ADMIN — ESMOLOL HYDROCHLORIDE 100 MG: 100 INJECTION, SOLUTION INTRAVENOUS at 09:56

## 2023-01-31 RX ADMIN — ALBUMIN (HUMAN) 25 G: 0.25 INJECTION, SOLUTION INTRAVENOUS at 19:34

## 2023-01-31 RX ADMIN — ROCURONIUM BROMIDE 30 MG: 10 INJECTION INTRAVENOUS at 08:52

## 2023-01-31 RX ADMIN — EPHEDRINE SULFATE 10 MG: 50 INJECTION INTRAVENOUS at 12:24

## 2023-01-31 RX ADMIN — EPHEDRINE SULFATE 10 MG: 50 INJECTION INTRAVENOUS at 12:21

## 2023-01-31 RX ADMIN — EPHEDRINE SULFATE 10 MG: 50 INJECTION INTRAVENOUS at 12:32

## 2023-01-31 RX ADMIN — Medication 200 MCG: at 10:34

## 2023-01-31 RX ADMIN — ALBUMIN HUMAN 25 G: 0.25 SOLUTION INTRAVENOUS at 15:21

## 2023-01-31 RX ADMIN — ESMOLOL HYDROCHLORIDE 50 MG: 100 INJECTION, SOLUTION INTRAVENOUS at 09:47

## 2023-01-31 RX ADMIN — ROCURONIUM BROMIDE 10 MG: 10 INJECTION INTRAVENOUS at 08:04

## 2023-01-31 RX ADMIN — EPHEDRINE SULFATE 10 MG: 50 INJECTION INTRAVENOUS at 12:30

## 2023-01-31 RX ADMIN — ALBUMIN (HUMAN) 250 ML: 12.5 INJECTION, SOLUTION INTRAVENOUS at 09:45

## 2023-01-31 RX ADMIN — NITROGLYCERIN 25 MCG: 20 INJECTION INTRAVENOUS at 10:13

## 2023-01-31 RX ADMIN — SODIUM CHLORIDE, PRESERVATIVE FREE 10 ML: 5 INJECTION INTRAVENOUS at 20:04

## 2023-01-31 RX ADMIN — SODIUM CHLORIDE: 9 INJECTION, SOLUTION INTRAVENOUS at 10:20

## 2023-01-31 RX ADMIN — ALBUMIN (HUMAN) 25 G: 0.25 INJECTION, SOLUTION INTRAVENOUS at 18:01

## 2023-01-31 RX ADMIN — Medication 200 MCG: at 12:26

## 2023-01-31 RX ADMIN — Medication 200 MCG: at 10:07

## 2023-01-31 RX ADMIN — SODIUM CHLORIDE: 9 INJECTION, SOLUTION INTRAVENOUS at 23:22

## 2023-01-31 RX ADMIN — Medication 50 MEQ: at 09:56

## 2023-01-31 RX ADMIN — SUGAMMADEX 200 MG: 100 INJECTION, SOLUTION INTRAVENOUS at 12:07

## 2023-01-31 RX ADMIN — CEFAZOLIN 2000 MG: 2 INJECTION, POWDER, FOR SOLUTION INTRAMUSCULAR; INTRAVENOUS at 10:56

## 2023-01-31 RX ADMIN — HYDROMORPHONE HYDROCHLORIDE 0.25 MG: 1 INJECTION, SOLUTION INTRAMUSCULAR; INTRAVENOUS; SUBCUTANEOUS at 12:45

## 2023-01-31 RX ADMIN — Medication 200 MCG: at 09:42

## 2023-01-31 RX ADMIN — Medication 300 MCG: at 10:51

## 2023-01-31 RX ADMIN — CALCIUM CHLORIDE 1 G: 100 INJECTION, SOLUTION INTRAVENOUS; INTRAVENTRICULAR at 10:10

## 2023-01-31 RX ADMIN — Medication 200 MCG: at 12:30

## 2023-01-31 RX ADMIN — NITROGLYCERIN 25 MCG: 20 INJECTION INTRAVENOUS at 10:14

## 2023-01-31 RX ADMIN — FENTANYL CITRATE 50 MCG: 50 INJECTION INTRAMUSCULAR; INTRAVENOUS at 07:22

## 2023-01-31 RX ADMIN — PROPOFOL 150 MG: 10 INJECTION, EMULSION INTRAVENOUS at 08:10

## 2023-01-31 RX ADMIN — ONDANSETRON 4 MG: 2 INJECTION INTRAMUSCULAR; INTRAVENOUS at 11:50

## 2023-01-31 RX ADMIN — NITROGLYCERIN 100 MCG: 20 INJECTION INTRAVENOUS at 09:52

## 2023-01-31 RX ADMIN — FENTANYL CITRATE 100 MCG: 50 INJECTION INTRAMUSCULAR; INTRAVENOUS at 11:01

## 2023-01-31 RX ADMIN — FENTANYL CITRATE 50 MCG: 50 INJECTION, SOLUTION INTRAMUSCULAR; INTRAVENOUS at 13:48

## 2023-01-31 RX ADMIN — Medication 50 MEQ: at 11:20

## 2023-01-31 RX ADMIN — Medication 50 MEQ: at 10:11

## 2023-01-31 RX ADMIN — LIDOCAINE HYDROCHLORIDE 100 MG: 20 INJECTION, SOLUTION EPIDURAL; INFILTRATION; INTRACAUDAL; PERINEURAL at 08:03

## 2023-01-31 RX ADMIN — Medication 200 MCG: at 10:28

## 2023-01-31 RX ADMIN — PHENYLEPHRINE HYDROCHLORIDE 25 MCG/MIN: 10 INJECTION INTRAVENOUS at 13:19

## 2023-01-31 RX ADMIN — EPHEDRINE SULFATE 10 MG: 50 INJECTION INTRAVENOUS at 09:01

## 2023-01-31 RX ADMIN — Medication 10 MG: at 07:40

## 2023-01-31 RX ADMIN — CALCIUM CHLORIDE 0.5 G: 100 INJECTION, SOLUTION INTRAVENOUS; INTRAVENTRICULAR at 10:34

## 2023-01-31 RX ADMIN — PROTAMINE SULFATE 50 MG: 10 INJECTION, SOLUTION INTRAVENOUS at 10:52

## 2023-01-31 RX ADMIN — Medication 200 MCG: at 12:23

## 2023-01-31 RX ADMIN — ESMOLOL HYDROCHLORIDE 50 MG: 100 INJECTION, SOLUTION INTRAVENOUS at 08:11

## 2023-01-31 RX ADMIN — CEFAZOLIN 2000 MG: 2 INJECTION, POWDER, FOR SOLUTION INTRAMUSCULAR; INTRAVENOUS at 23:41

## 2023-01-31 RX ADMIN — SODIUM CHLORIDE 1000 ML: 9 INJECTION, SOLUTION INTRAVENOUS at 17:11

## 2023-01-31 RX ADMIN — EPHEDRINE SULFATE 10 MG: 50 INJECTION INTRAVENOUS at 08:19

## 2023-01-31 RX ADMIN — HYDROMORPHONE HYDROCHLORIDE 0.25 MG: 1 INJECTION, SOLUTION INTRAMUSCULAR; INTRAVENOUS; SUBCUTANEOUS at 12:56

## 2023-01-31 RX ADMIN — EPHEDRINE SULFATE 10 MG: 50 INJECTION INTRAVENOUS at 09:40

## 2023-01-31 RX ADMIN — Medication 200 MCG: at 10:06

## 2023-01-31 RX ADMIN — Medication 25 MEQ: at 12:56

## 2023-01-31 RX ADMIN — ALBUMIN (HUMAN) 250 ML: 12.5 INJECTION, SOLUTION INTRAVENOUS at 10:05

## 2023-01-31 RX ADMIN — SODIUM CHLORIDE: 9 INJECTION, SOLUTION INTRAVENOUS at 13:07

## 2023-01-31 RX ADMIN — GLYCOPYRROLATE 0.2 MG: 0.2 INJECTION, SOLUTION INTRAMUSCULAR; INTRAVENOUS at 09:17

## 2023-01-31 RX ADMIN — CEFAZOLIN 2000 MG: 2 INJECTION, POWDER, FOR SOLUTION INTRAMUSCULAR; INTRAVENOUS at 17:22

## 2023-01-31 RX ADMIN — Medication 200 MCG: at 12:19

## 2023-01-31 RX ADMIN — EPHEDRINE SULFATE 10 MG: 50 INJECTION INTRAVENOUS at 08:15

## 2023-01-31 RX ADMIN — SODIUM CHLORIDE 1000 ML: 9 INJECTION, SOLUTION INTRAVENOUS at 19:33

## 2023-01-31 RX ADMIN — Medication 10 MG: at 08:02

## 2023-01-31 RX ADMIN — MIDAZOLAM 1 MG: 1 INJECTION INTRAMUSCULAR; INTRAVENOUS at 07:20

## 2023-01-31 ASSESSMENT — PAIN - FUNCTIONAL ASSESSMENT
PAIN_FUNCTIONAL_ASSESSMENT: PREVENTS OR INTERFERES SOME ACTIVE ACTIVITIES AND ADLS
PAIN_FUNCTIONAL_ASSESSMENT: 0-10

## 2023-01-31 ASSESSMENT — PAIN DESCRIPTION - ORIENTATION: ORIENTATION: RIGHT;LEFT

## 2023-01-31 ASSESSMENT — LIFESTYLE VARIABLES: SMOKING_STATUS: 1

## 2023-01-31 ASSESSMENT — PAIN SCALES - GENERAL
PAINLEVEL_OUTOF10: 0
PAINLEVEL_OUTOF10: 0
PAINLEVEL_OUTOF10: 10
PAINLEVEL_OUTOF10: 6
PAINLEVEL_OUTOF10: 0

## 2023-01-31 ASSESSMENT — PAIN DESCRIPTION - PAIN TYPE: TYPE: SURGICAL PAIN

## 2023-01-31 ASSESSMENT — PAIN DESCRIPTION - DESCRIPTORS: DESCRIPTORS: ACHING;SORE

## 2023-01-31 ASSESSMENT — ENCOUNTER SYMPTOMS: SHORTNESS OF BREATH: 0

## 2023-01-31 ASSESSMENT — PAIN DESCRIPTION - FREQUENCY: FREQUENCY: CONTINUOUS

## 2023-01-31 ASSESSMENT — PAIN DESCRIPTION - ONSET: ONSET: ON-GOING

## 2023-01-31 ASSESSMENT — PAIN DESCRIPTION - LOCATION: LOCATION: ABDOMEN

## 2023-01-31 NOTE — ANESTHESIA PRE PROCEDURE
Department of Anesthesiology  Preprocedure Note       Name:  Shellie Nguyen   Age:  68 y.o.  :  1945                                          MRN:  [id number]         Date:  2023      Surgeon: Charisma Benavidez):  Shivani Rae DO    Procedure: Procedure(s):  DIRECT REPAIR OF INFRARENAL ABDOMINAL AORTIC ANEURYSM    Medications prior to admission:   Prior to Admission medications    Medication Sig Start Date End Date Taking? Authorizing Provider   Melatonin 5 MG CAPS Take by mouth nightly as needed    Historical Provider, MD   Coenzyme Q10 (COQ-10) 100 MG CAPS Take by mouth daily    Historical Provider, MD   Multiple Vitamin (MULTIVITAMIN) TABS tablet Take 1 tablet by mouth daily    Historical Provider, MD   Misc Natural Products (PROSTATE THERAPY COMPLEX PO) Take by mouth daily    Historical Provider, MD   Chelated Zinc 50 MG TABS Take by mouth See Admin Instructions Only takes 5 days a week    Historical Provider, MD   Cholecalciferol (VITAMIN D3 PO) Take 2,000 Units by mouth daily    Historical Provider, MD   amLODIPine (NORVASC) 10 MG tablet TAKE 1 TABLET EVERY DAY 22   Tr Esquivel MD   lisinopril (PRINIVIL;ZESTRIL) 30 MG tablet TAKE 1 TABLET EVERY DAY 22   Romana Bos, MD   levothyroxine (SYNTHROID) 88 MCG tablet TAKE 1 TABLET EVERY DAY 22   Romana Bos, MD   simvastatin (ZOCOR) 20 MG tablet TAKE 1 TABLET AT BEDTIME 22   Tr Esquivel MD   metoprolol tartrate (LOPRESSOR) 25 MG tablet TAKE 1 TABLET DAILY 21   Tr Esquivel MD   gabapentin (NEURONTIN) 300 MG capsule Take 300 mg by mouth 3 times daily. 21   Historical Provider, MD   mirtazapine (REMERON) 45 MG tablet Take 45 mg by mouth nightly    Historical Provider, MD   LORazepam (ATIVAN) 1 MG tablet Take 1 mg by mouth 2 times daily as needed for Anxiety.     Historical Provider, MD   escitalopram (LEXAPRO) 10 MG tablet Take 15 mg by mouth daily     Historical Provider, MD   aspirin 81 MG tablet Take 81 mg by mouth daily.      Historical Provider, MD       Current medications:    Current Facility-Administered Medications   Medication Dose Route Frequency Provider Last Rate Last Admin   • ceFAZolin (ANCEF) 2,000 mg in sodium chloride 0.9 % 50 mL IVPB (mini-bag)  2,000 mg IntraVENous On Call to OR Jayant Horn DO       • sodium chloride flush 0.9 % injection 5-40 mL  5-40 mL IntraVENous 2 times per day Jessica Mcnamara MD       • sodium chloride flush 0.9 % injection 5-40 mL  5-40 mL IntraVENous PRN Jessica Mcnamara MD       • 0.9 % sodium chloride infusion   IntraVENous PRN Jessica Mcnamara  mL/hr at 01/31/23 0621 New Bag at 01/31/23 0621       Allergies:    Allergies   Allergen Reactions   • Lidocaine Other (See Comments)     Burning sensation         Problem List:    Patient Active Problem List   Diagnosis Code   • HTN (hypertension) I10   • Hypertrophy of prostate without urinary obstruction and other lower urinary tract symptoms (LUTS) N40.0   • Hypercholesterolemia E78.00   • Depression F32.A   • Renal cyst N28.1   • BPH w/o urinary obs/LUTS N40.0   • Pulmonary emphysema (HCC) J43.9   • Hyperglycemia R73.9   • Tobacco abuse Z72.0   • 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   • Abdominal aortic aneurysm (AAA) without rupture I71.40   • Anal fissure K60.2   • Preop examination Z01.818       Past Medical History:        Diagnosis Date   • AAA (abdominal aortic aneurysm)    • Anxiety    • Depression    • Hyperlipidemia    • Hypertension    • Hypothyroidism    • Iliac artery stenosis, left (HCC) 09/16/2021       Past Surgical History:        Procedure Laterality Date   • ANUS SURGERY N/A 05/13/2021    EXAM UNDER ANESTHESIA, BOTOX INJECTION ANAL FISSURE performed by Maikol Delgado MD at [facility] OR   • COLONOSCOPY  09/12/2014    Dr Moreno / Polyps   • LUMBAR LAMINECTOMY     • OTHER SURGICAL HISTORY  01/29/2015    neck cyst excision       Social History:    Social History     Tobacco Use   • Smoking  status: Every Day     Packs/day: 0.50     Years: 53.00     Pack years: 26.50     Types: Cigarettes    Smokeless tobacco: Never    Tobacco comments:     discussed the effect smoking has on aneurysms   Substance Use Topics    Alcohol use: Yes     Comment: rare                                Ready to quit: Not Answered  Counseling given: Not Answered  Tobacco comments: discussed the effect smoking has on aneurysms      Vital Signs (Current):   Vitals:    01/18/23 1651 01/31/23 0610   BP:  (!) 140/68   Pulse:  (!) 48   Resp:  18   Temp:  97 °F (36.1 °C)   TempSrc:  Temporal   SpO2:  97%   Weight: 191 lb (86.6 kg)    Height: 5' 9.5\" (1.765 m)                                               BP Readings from Last 3 Encounters:   01/31/23 (!) 140/68   01/10/23 128/74   09/13/22 128/80       NPO Status: Time of last liquid consumption: 2300                        Time of last solid consumption: 2300                        Date of last liquid consumption: 01/30/23                        Date of last solid food consumption: 01/30/23    BMI:   Wt Readings from Last 3 Encounters:   01/18/23 191 lb (86.6 kg)   01/10/23 197 lb 3.2 oz (89.4 kg)   09/13/22 193 lb (87.5 kg)     Body mass index is 27.8 kg/m².     CBC:   Lab Results   Component Value Date/Time    WBC 7.5 01/31/2023 06:00 AM    RBC 4.69 01/31/2023 06:00 AM    HGB 14.1 01/31/2023 06:00 AM    HCT 42.8 01/31/2023 06:00 AM    MCV 91.2 01/31/2023 06:00 AM    RDW 15.6 01/31/2023 06:00 AM     01/31/2023 06:00 AM       CMP:   Lab Results   Component Value Date/Time     09/13/2022 11:50 AM    K 5.3 09/13/2022 11:50 AM     09/13/2022 11:50 AM    CO2 26 09/13/2022 11:50 AM    BUN 17 09/13/2022 11:50 AM    CREATININE 1.1 11/19/2022 09:21 AM    CREATININE 1.2 09/13/2022 11:50 AM    GFRAA >60 09/13/2022 11:50 AM    GFRAA >60 04/26/2013 09:53 AM    AGRATIO 1.9 09/13/2022 11:50 AM    LABGLOM >60 11/19/2022 09:21 AM    GLUCOSE 109 09/13/2022 11:50 AM    GLUCOSE 113 08/01/2011 08:50 AM    PROT 7.3 09/13/2022 11:50 AM    PROT 7.2 10/16/2012 11:25 AM    CALCIUM 9.6 09/13/2022 11:50 AM    BILITOT <0.2 09/13/2022 11:50 AM    ALKPHOS 73 09/13/2022 11:50 AM    AST 18 09/13/2022 11:50 AM    ALT 15 09/13/2022 11:50 AM       POC Tests: No results for input(s): POCGLU, POCNA, POCK, POCCL, POCBUN, POCHEMO, POCHCT in the last 72 hours. Coags: No results found for: PROTIME, INR, APTT    HCG (If Applicable): No results found for: PREGTESTUR, PREGSERUM, HCG, HCGQUANT     ABGs: No results found for: PHART, PO2ART, VJH0SFK, VRS5BOI, BEART, U9JEMAAZ     Type & Screen (If Applicable):  No results found for: LABABO, LABRH    Drug/Infectious Status (If Applicable):  No results found for: HIV, HEPCAB    COVID-19 Screening (If Applicable):   Lab Results   Component Value Date/Time    COVID19 Not Detected 05/10/2021 03:45 PM           Anesthesia Evaluation  Patient summary reviewed and Nursing notes reviewed no history of anesthetic complications:   Airway: Mallampati: II  TM distance: >3 FB   Neck ROM: full  Mouth opening: > = 3 FB   Dental: normal exam         Pulmonary: breath sounds clear to auscultation  (+) COPD:  current smoker    (-) pneumonia, asthma, shortness of breath and recent URI                           Cardiovascular:    (+) hypertension:,     (-) pacemaker, valvular problems/murmurs, past MI, CAD, CABG/stent, dysrhythmias,  angina,  CHF, orthopnea, PND,  HODGSON, no pulmonary hypertension and no hyperlipidemia      Rhythm: regular                      Neuro/Psych:   (+) psychiatric history:   (-) seizures, neuromuscular disease, TIA, CVA, headaches and depression/anxiety            GI/Hepatic/Renal:        (-) hiatal hernia, GERD, PUD, hepatitis, liver disease, no renal disease, bowel prep and no morbid obesity       Endo/Other:    (+) hypothyroidism::., no malignancy/cancer.     (-) diabetes mellitus, hyperthyroidism, blood dyscrasia, arthritis, no electrolyte abnormalities, no malignancy/cancer               Abdominal:             Vascular:   + PVD, aortic or cerebral, . ROS comment: AAA. Other Findings:           Anesthesia Plan      general and regional     ASA 3     (Bilateral TAP blocks)    arterial line and central line  MIPS: Postoperative opioids intended, Prophylactic antiemetics administered and Postoperative trial extubation. Anesthetic plan and risks discussed with patient. Use of blood products discussed with patient whom consented to blood products. Plan discussed with CRNA. Ehsan Enriquez MD   1/31/2023        This pre-anesthesia assessment may be used as a history and physical.    DOS STAFF ADDENDUM:    Pt seen and examined, chart reviewed (including anesthesia, drug and allergy history). No interval changes to history and physical examination. Anesthetic plan, risks, benefits, alternatives, and personnel involved discussed with patient. Patient verbalized an understanding and agrees to proceed.       Ehsan Enriquez MD  January 31, 2023  6:48 AM

## 2023-01-31 NOTE — PROGRESS NOTES
Pt arrived to room 1310 from OR. Patient arrived on NC oxygen at 4L weaned to 2l throughout. Pt sedate from surgey. RR WNL. Pt alert to voice. A-line intact and zeroed. Gao catheter intact. Patient reporting pain 10/10. Pain medication given as needed. Blood pressure below normal limits, see flowsheets and MAR. Patient alert and oriented x4. Patient able to move all extremities freely. FFP and Cryo ordered. FFP given by this RN. PCA pump in use and explained to patient on how to use. Karen 9. Bedside handoff performed with Tiarra Mcneal RN to assume care.     Electronically signed by Esha Almanza RN on 1/31/2023 at 3:30 PM

## 2023-01-31 NOTE — BRIEF OP NOTE
Brief Postoperative Note      Patient: Mackenzie Mallory  YOB: 1945  MRN: [id number]    Date of Procedure: 1/31/2023    Pre-Op Diagnosis: INFRARENAL ABDOMINAL AORTIC ANEURYSM WITHOUT RUPTURE    Post-Op Diagnosis: Same       Procedure(s):  DIRECT REPAIR OF INFRARENAL ABDOMINAL AORTIC ANEURYSM    Surgeon(s):  Sienna Wagner DO    Assistant:  Surgical Assistant: Cullen Martinez    Anesthesia: General    Estimated Blood Loss (mL): 5500, 3400 back via cellsaver, 4000 total IVF with 9571 albumin    Complications: None    Specimens:   ID Type Source Tests Collected by Time Destination   1 : 1. blood for ABG Blood Blood BLOOD GAS, ARTERIAL Sienna Grant,  1/31/2023 2336    2 : 2. blood for ABG Blood Blood BLOOD GAS, ARTERIAL [facility], DO 1/31/2023 0955    3 : 3. blood for ABG Blood Blood BLOOD GAS, ARTERIAL (Canceled) [facility],  1/31/2023 1019    4 : 4. blood for ABG Blood Blood BLOOD GAS, ARTERIAL (Canceled) [facility],  1/31/2023 1025    5 : 5. blood for coagulation Blood Blood CBC, PLATELET COUNT (Canceled), FIBRINOGEN, PROTIME-INR, APTT Jayant Ananyasara  1/31/2023 1045    6 : 6. blood for ABG Blood Blood BLOOD GAS, ARTERIAL (Canceled) [facility], DO 1/31/2023 1107        Implants:  Implant Name Type Inv.  Item Serial No.  Lot No. LRB No. Used Action   GRAFT VASC ALBOGRFT POLY 22 MM [id number] CM QUENTIN KNIT STR - [id number] Vascular grafts GRAFT VASC ALBOGRFT POLY 22 MM [id number] CM QUENTIN KNIT STR  [facility] [employer]- [id number] N/A 1 Implanted         Drains:   NG/OG/NJ/NE Tube Nasogastric 18 fr Right nostril (Active)       Urinary Catheter 01/31/23 2 Way (Active)           Electronically signed by Sienna Wagner DO on 1/31/2023 at 11:56 AM

## 2023-01-31 NOTE — ANESTHESIA PROCEDURE NOTES
Arterial Line:    An arterial line was placed using surface landmarks, in the pre-op for the following indication(s): continuous blood pressure monitoring and blood sampling needed.    A 20 gauge (size), 1 and 3/8 inch (length), Angiocath (type) catheter was placed, Seldinger technique not used, into the left radial artery, secured by tape.  Anesthesia type: Local  Local infiltration: Injection    Events:  patient tolerated procedure well with no complications and EBL < 5mL.  Anesthesiologist: Corwin Alvarenga MD  Performed: Anesthesiologist   Preanesthetic Checklist  Completed: patient identified, IV checked, site marked, risks and benefits discussed, surgical/procedural consents, equipment checked, pre-op evaluation, timeout performed, anesthesia consent given, oxygen available and monitors applied/VS acknowledged

## 2023-01-31 NOTE — ANESTHESIA POSTPROCEDURE EVALUATION
Department of Anesthesiology  Postprocedure Note    Patient: Jonathan Mancera  MRN: [id number]  YOB: 1945  Date of evaluation: 1/31/2023      Procedure Summary     Date: 01/31/23 Room / Location: [address]    Anesthesia Start: 3302 Anesthesia Stop: 1257    Procedure: DIRECT REPAIR OF INFRARENAL ABDOMINAL AORTIC ANEURYSM (Abdomen) Diagnosis:       Infrarenal abdominal aortic aneurysm (AAA) without rupture      (INFRARENAL ABDOMINAL AORTIC ANEURYSM WITHOUT RUPTURE)    Surgeons: Mai Matthews DO Responsible Provider: Dick Daigle MD    Anesthesia Type: general, regional ASA Status: 3          Anesthesia Type: No value filed.     Karen Phase I: Karen Score: 10    Karen Phase II:        Anesthesia Post Evaluation    Patient location during evaluation: ICU  Patient participation: complete - patient participated  Level of consciousness: awake  Airway patency: patent  Nausea & Vomiting: no nausea and no vomiting  Complications: no  Cardiovascular status: vasoactive/inotropes  Respiratory status: acceptable  Hydration status: euvolemic

## 2023-01-31 NOTE — ANESTHESIA PROCEDURE NOTES
Peripheral Block    Patient location during procedure: pre-op  Reason for block: post-op pain management and at surgeon's request  Start time: 1/31/2023 7:49 AM  End time: 1/31/2023 7:52 AM  Staffing  Performed: anesthesiologist   Anesthesiologist: Arsen Zavala MD  Preanesthetic Checklist  Completed: patient identified, IV checked, site marked, risks and benefits discussed, surgical/procedural consents, equipment checked, pre-op evaluation, timeout performed, anesthesia consent given, oxygen available and monitors applied/VS acknowledged  Peripheral Block   Patient position: supine  Prep: ChloraPrep  Provider prep: mask and sterile gloves  Patient monitoring: cardiac monitor, continuous pulse ox, frequent blood pressure checks and IV access  Block type: TAP  Laterality: right  Injection technique: single-shot  Guidance: ultrasound guided  Infiltration strength: 1 %  Dose: 3 mL    Needle   Needle gauge: 21 G  Needle localization: ultrasound guidance  Needle insertion depth: 3 cm  Needle length: 10 cm  Assessment   Injection assessment: negative aspiration for heme, no paresthesia on injection and local visualized surrounding nerve on ultrasound  Paresthesia pain: none  Slow fractionated injection: yes  Hemodynamics: stableno  Outcomes: uncomplicated and patient tolerated procedure well    Additional Notes  Immediately prior to procedure a \"time out\" was called to verify the correct patient, allergies, laterality, procedure and equipment. Time out performed with preop RN    Local Anesthetic: 0.5 %  Bupivacaine   Amount: 20 ml  in 5 ml increments after negative aspiration each time. External Oblique muscle, Internal Oblique muscle, Transversus Abdominis muscle are identified; the tip of the needle and the spread of the local anesthetic between the Internal Oblique muscle and the Transversus Abdominis muscles are visualized.  The muscles appeared to be anatomically normal and there were no abnormal pathologically findings seen.         Medications Administered  ropivacaine (NAROPIN) injection 0.5% - Perineural   20 mL - 1/31/2023 7:49:00 AM

## 2023-01-31 NOTE — PLAN OF CARE
Problem: Pain  Goal: Verbalizes/displays adequate comfort level or baseline comfort level  Outcome: Progressing  Note: Pt with c/o pain this afternoon r/t surgical incisions. PRN and PCA pain medications administered will mointor      Problem: Safety - Adult  Goal: Free from fall injury  Outcome: Progressing  Note: Pt is a fall risk. Fall risk protocol in place. See Robbin Bradley Fall Score. Pt bed is in low position, bed alarm is on, side rails up, fall risk bracelet applied. , non-skid footwear in use. Patient/family educated on fall risk protocol, instructed to call for assistance when needed and belongings are in reach. assistance. Will continue with hourly rounds for po intake, pain needs, toileting and repositioning as needed. Will continue to monitor for needs. Problem: Skin/Tissue Integrity  Goal: Absence of new skin breakdown  Description: 1. Monitor for areas of redness and/or skin breakdown  2. Assess vascular access sites hourly  3. Every 4-6 hours minimum:  Change oxygen saturation probe site  4. Every 4-6 hours:  If on nasal continuous positive airway pressure, respiratory therapy assess nares and determine need for appliance change or resting period. Outcome: Progressing  Note: Pt at risk for skin breakdown. See Kristian score. Pt remains on bedrest. Unable to reposition self in bed. Heels elevated off bed. Sacral heart mepilex intact to protect, site inspected and intact underneath. Will continue to turn and reposition patient every two hours and as needed. Will continue to keep patient clean and dry, applying skin care cream as needed. Pillows used for repositioning q2hs. Will continue to monitor and assess for skin breakdown.

## 2023-01-31 NOTE — PROGRESS NOTES
Pt's BP support requirement remains high with zenon gtt on max dose. BP above MAP goal. Dr. Win Draft called with pt requirements. See MAR for new orders. Will monitor.

## 2023-01-31 NOTE — ANESTHESIA PROCEDURE NOTES
Peripheral Block    Patient location during procedure: pre-op  Reason for block: post-op pain management and at surgeon's request  Start time: 1/31/2023 7:40 AM  End time: 1/31/2023 7:42 AM  Staffing  Performed: anesthesiologist   Anesthesiologist: Arelis Bhatti MD  Preanesthetic Checklist  Completed: patient identified, IV checked, site marked, risks and benefits discussed, surgical/procedural consents, equipment checked, pre-op evaluation, timeout performed, anesthesia consent given, oxygen available and monitors applied/VS acknowledged  Peripheral Block   Patient position: supine  Prep: ChloraPrep  Provider prep: mask and sterile gloves  Patient monitoring: cardiac monitor, continuous pulse ox, frequent blood pressure checks and IV access  Block type: TAP  Laterality: left  Injection technique: single-shot  Guidance: ultrasound guided  Infiltration strength: 1 %  Dose: 3 mL    Needle   Needle gauge: 21 G  Needle localization: ultrasound guidance  Needle insertion depth: 3 cm  Needle length: 10 cm  Assessment   Injection assessment: negative aspiration for heme, no paresthesia on injection and local visualized surrounding nerve on ultrasound  Paresthesia pain: none  Slow fractionated injection: yes  Hemodynamics: stableno  Outcomes: uncomplicated and patient tolerated procedure well    Additional Notes  Immediately prior to procedure a \"time out\" was called to verify the correct patient, allergies, laterality, procedure and equipment. Time out performed with preop RN    Local Anesthetic: 0.5 %  Bupivacaine   Amount: 20 ml  in 5 ml increments after negative aspiration each time. External Oblique muscle, Internal Oblique muscle, Transversus Abdominis muscle are identified; the tip of the needle and the spread of the local anesthetic between the Internal Oblique muscle and the Transversus Abdominis muscles are visualized.  The muscles appeared to be anatomically normal and there were no abnormal pathologically findings seen.         Medications Administered  ropivacaine (NAROPIN) injection 0.5% - Perineural   20 mL - 1/31/2023 7:40:00 AM

## 2023-01-31 NOTE — ANESTHESIA PROCEDURE NOTES
Central Venous Line:    A central venous line was placed using surface landmarks, in the pre-op for the following indication(s): central venous access and CVP monitoring. Sterility preparation included the following: hand hygiene performed prior to procedure, maximum sterile barriers used and sterile technique used to drape from head to toe. The patient was placed in Trendelenburg position. The right internal jugular vein was prepped. The site was prepped with Chloraprep. A 7 Fr (size), 16 (length), triple lumen was placed. During the procedure, the following specific steps were taken: target vein identified, needle advanced into vein and blood aspirated and guidewire advanced into vein.         Outcomes: uncomplicated and patient tolerated procedure wellno  Anesthesia type: general..No  Staffing  Performed: Anesthesiologist   Anesthesiologist: Shona Yanes MD  Preanesthetic Checklist  Completed: patient identified, timeout performed and monitors applied/VS acknowledged

## 2023-01-31 NOTE — H&P
H&P Update    I have reviewed the history and physical and examined the patient and find no relevant changes. I have reviewed with the patient and/or family the risks, benefits, and alternatives to the procedure. I HAVE PRESENTED REASONABLE ALTERNATIVES TO THE PATIENT'S PROPOSED CARE, TREATMENT, AND SERVICES. THE DISCUSSION I HAVE DONE ENCOMPASSED RISKS, BENEFITS, AND SIDE EFFECTS RELATED TO THE ALTERNATIVES AND THE RISKS RELATED TO NOT RECEIVING THE PROPOSED CARE, TREATMENT, SERVICES. Patient:  Gela Syed   :   1945    Intended Procedure: open aaa repair     Vitals:    23 0610   BP: (!) 140/68   Pulse: (!) 48   Resp: 18   Temp: 97 °F (36.1 °C)   SpO2: 97%       Nursing notes reviewed and agreed. Medications reviewed  Allergies:    Allergies   Allergen Reactions    Lidocaine Other (See Comments)     Burning sensation           Post Procedure plan: cvu    Alejandrina Hogan DO, FACS, FSVS, [address] [facility]

## 2023-02-01 ENCOUNTER — APPOINTMENT (OUTPATIENT)
Dept: GENERAL RADIOLOGY | Age: 78
DRG: 270 | End: 2023-02-01
Attending: STUDENT IN AN ORGANIZED HEALTH CARE EDUCATION/TRAINING PROGRAM
Payer: MEDICARE

## 2023-02-01 ENCOUNTER — TELEPHONE (OUTPATIENT)
Dept: SURGERY | Age: 78
End: 2023-02-01

## 2023-02-01 LAB
ANION GAP SERPL CALCULATED.3IONS-SCNC: 12 MMOL/L (ref 3–16)
APTT: 33.2 SEC (ref 23–34.3)
BUN BLDV-MCNC: 24 MG/DL (ref 7–20)
CALCIUM SERPL-MCNC: 8 MG/DL (ref 8.3–10.6)
CHLORIDE BLD-SCNC: 117 MMOL/L (ref 99–110)
CO2: 23 MMOL/L (ref 21–32)
CREAT SERPL-MCNC: 1.9 MG/DL (ref 0.8–1.3)
EKG ATRIAL RATE: 144 BPM
EKG DIAGNOSIS: NORMAL
EKG Q-T INTERVAL: 346 MS
EKG QRS DURATION: 82 MS
EKG QTC CALCULATION (BAZETT): 489 MS
EKG R AXIS: 22 DEGREES
EKG T AXIS: 259 DEGREES
EKG VENTRICULAR RATE: 120 BPM
GFR SERPL CREATININE-BSD FRML MDRD: 36 ML/MIN/{1.73_M2}
GLUCOSE BLD-MCNC: 134 MG/DL (ref 70–99)
HCT VFR BLD CALC: 25.3 % (ref 40.5–52.5)
HEMOGLOBIN: 8.1 G/DL (ref 13.5–17.5)
INR BLD: 1.47 (ref 0.87–1.14)
LV EF: 68 %
LVEF MODALITY: NORMAL
MCH RBC QN AUTO: 29.2 PG (ref 26–34)
MCHC RBC AUTO-ENTMCNC: 32 G/DL (ref 31–36)
MCV RBC AUTO: 91.4 FL (ref 80–100)
PDW BLD-RTO: 15.6 % (ref 12.4–15.4)
PLATELET # BLD: 116 K/UL (ref 135–450)
PMV BLD AUTO: 8 FL (ref 5–10.5)
POTASSIUM SERPL-SCNC: 4.5 MMOL/L (ref 3.5–5.1)
PROTHROMBIN TIME: 17.8 SEC (ref 11.7–14.5)
RBC # BLD: 2.77 M/UL (ref 4.2–5.9)
SODIUM BLD-SCNC: 152 MMOL/L (ref 136–145)
WBC # BLD: 11.7 K/UL (ref 4–11)

## 2023-02-01 PROCEDURE — APPNB30 APP NON BILLABLE TIME 0-30 MINS: Performed by: NURSE PRACTITIONER

## 2023-02-01 PROCEDURE — 99223 1ST HOSP IP/OBS HIGH 75: CPT | Performed by: INTERNAL MEDICINE

## 2023-02-01 PROCEDURE — 93010 ELECTROCARDIOGRAM REPORT: CPT | Performed by: INTERNAL MEDICINE

## 2023-02-01 PROCEDURE — 85610 PROTHROMBIN TIME: CPT

## 2023-02-01 PROCEDURE — 2700000000 HC OXYGEN THERAPY PER DAY

## 2023-02-01 PROCEDURE — 2500000003 HC RX 250 WO HCPCS: Performed by: STUDENT IN AN ORGANIZED HEALTH CARE EDUCATION/TRAINING PROGRAM

## 2023-02-01 PROCEDURE — 80048 BASIC METABOLIC PNL TOTAL CA: CPT

## 2023-02-01 PROCEDURE — 2580000003 HC RX 258: Performed by: STUDENT IN AN ORGANIZED HEALTH CARE EDUCATION/TRAINING PROGRAM

## 2023-02-01 PROCEDURE — 2100000000 HC CCU R&B

## 2023-02-01 PROCEDURE — 85730 THROMBOPLASTIN TIME PARTIAL: CPT

## 2023-02-01 PROCEDURE — 6360000002 HC RX W HCPCS: Performed by: STUDENT IN AN ORGANIZED HEALTH CARE EDUCATION/TRAINING PROGRAM

## 2023-02-01 PROCEDURE — 93005 ELECTROCARDIOGRAM TRACING: CPT | Performed by: STUDENT IN AN ORGANIZED HEALTH CARE EDUCATION/TRAINING PROGRAM

## 2023-02-01 PROCEDURE — 94761 N-INVAS EAR/PLS OXIMETRY MLT: CPT

## 2023-02-01 PROCEDURE — 6370000000 HC RX 637 (ALT 250 FOR IP): Performed by: STUDENT IN AN ORGANIZED HEALTH CARE EDUCATION/TRAINING PROGRAM

## 2023-02-01 PROCEDURE — 93306 TTE W/DOPPLER COMPLETE: CPT

## 2023-02-01 PROCEDURE — 85027 COMPLETE CBC AUTOMATED: CPT

## 2023-02-01 PROCEDURE — 71045 X-RAY EXAM CHEST 1 VIEW: CPT

## 2023-02-01 PROCEDURE — 0DH68UZ INSERTION OF FEEDING DEVICE INTO STOMACH, VIA NATURAL OR ARTIFICIAL OPENING ENDOSCOPIC: ICD-10-PCS | Performed by: RADIOLOGY

## 2023-02-01 RX ORDER — ENOXAPARIN SODIUM 100 MG/ML
30 INJECTION SUBCUTANEOUS DAILY
Status: DISCONTINUED | OUTPATIENT
Start: 2023-02-01 | End: 2023-02-07 | Stop reason: HOSPADM

## 2023-02-01 RX ORDER — POLYETHYLENE GLYCOL 3350 17 G/17G
17 POWDER, FOR SOLUTION ORAL DAILY PRN
Status: DISCONTINUED | OUTPATIENT
Start: 2023-02-01 | End: 2023-02-02

## 2023-02-01 RX ORDER — DEXTROSE AND SODIUM CHLORIDE 5; .45 G/100ML; G/100ML
INJECTION, SOLUTION INTRAVENOUS CONTINUOUS
Status: DISCONTINUED | OUTPATIENT
Start: 2023-02-01 | End: 2023-02-03

## 2023-02-01 RX ORDER — LORAZEPAM 2 MG/ML
0.5 INJECTION INTRAMUSCULAR 2 TIMES DAILY
Status: DISCONTINUED | OUTPATIENT
Start: 2023-02-01 | End: 2023-02-02

## 2023-02-01 RX ORDER — NALOXONE HYDROCHLORIDE 0.4 MG/ML
INJECTION, SOLUTION INTRAMUSCULAR; INTRAVENOUS; SUBCUTANEOUS PRN
Status: DISCONTINUED | OUTPATIENT
Start: 2023-02-01 | End: 2023-02-02

## 2023-02-01 RX ADMIN — LORAZEPAM 0.5 MG: 2 INJECTION INTRAMUSCULAR; INTRAVENOUS at 08:49

## 2023-02-01 RX ADMIN — Medication 10 MG: at 21:35

## 2023-02-01 RX ADMIN — DEXTROSE AND SODIUM CHLORIDE: 5; 450 INJECTION, SOLUTION INTRAVENOUS at 17:57

## 2023-02-01 RX ADMIN — Medication: at 08:41

## 2023-02-01 RX ADMIN — DEXTROSE AND SODIUM CHLORIDE: 5; 450 INJECTION, SOLUTION INTRAVENOUS at 07:27

## 2023-02-01 RX ADMIN — AMIODARONE HYDROCHLORIDE 0.5 MG/MIN: 50 INJECTION, SOLUTION INTRAVENOUS at 16:34

## 2023-02-01 RX ADMIN — LORAZEPAM 0.5 MG: 2 INJECTION INTRAMUSCULAR; INTRAVENOUS at 21:35

## 2023-02-01 RX ADMIN — AMIODARONE HYDROCHLORIDE 1 MG/MIN: 50 INJECTION, SOLUTION INTRAVENOUS at 07:43

## 2023-02-01 RX ADMIN — ENOXAPARIN SODIUM 30 MG: 100 INJECTION SUBCUTANEOUS at 08:49

## 2023-02-01 RX ADMIN — SODIUM CHLORIDE, PRESERVATIVE FREE 10 ML: 5 INJECTION INTRAVENOUS at 21:41

## 2023-02-01 ASSESSMENT — PAIN SCALES - GENERAL
PAINLEVEL_OUTOF10: 0
PAINLEVEL_OUTOF10: 10
PAINLEVEL_OUTOF10: 8
PAINLEVEL_OUTOF10: 0
PAINLEVEL_OUTOF10: 7
PAINLEVEL_OUTOF10: 9
PAINLEVEL_OUTOF10: 6
PAINLEVEL_OUTOF10: 4

## 2023-02-01 ASSESSMENT — PAIN DESCRIPTION - PAIN TYPE
TYPE: SURGICAL PAIN

## 2023-02-01 ASSESSMENT — PAIN DESCRIPTION - FREQUENCY
FREQUENCY: CONTINUOUS

## 2023-02-01 ASSESSMENT — PAIN DESCRIPTION - ONSET
ONSET: ON-GOING

## 2023-02-01 ASSESSMENT — PAIN DESCRIPTION - ORIENTATION
ORIENTATION: MID
ORIENTATION: RIGHT;LEFT
ORIENTATION: MID
ORIENTATION: MID

## 2023-02-01 ASSESSMENT — PAIN DESCRIPTION - LOCATION
LOCATION: ABDOMEN

## 2023-02-01 ASSESSMENT — PAIN - FUNCTIONAL ASSESSMENT
PAIN_FUNCTIONAL_ASSESSMENT: PREVENTS OR INTERFERES SOME ACTIVE ACTIVITIES AND ADLS

## 2023-02-01 ASSESSMENT — PAIN DESCRIPTION - DESCRIPTORS
DESCRIPTORS: BURNING
DESCRIPTORS: BURNING
DESCRIPTORS: ACHING;SORE
DESCRIPTORS: BURNING

## 2023-02-01 NOTE — PROGRESS NOTES
4 Eyes Skin Assessment     NAME:  Nita Escalante  YOB: 1945  MEDICAL RECORD NUMBER:  [id number]    The patient is being assessed for  Shift Handoff    I agree that One RN have performed a thorough Head to Toe Skin Assessment on the patient. ALL assessment sites listed below have been assessed. Areas assessed by both nurses:    Head, Face, Ears, Shoulders, Back, Chest, Arms, Elbows, Hands, Sacrum. Buttock, Coccyx, Ischium, and Legs. Feet and Heels        Does the Patient have a Wound?  No noted wound(s)       Kristian Prevention initiated by RN: No   Wound Care Orders initiated by RN: No    Pressure Injury (Stage 3,4, Unstageable, DTI, NWPT, and Complex wounds) if present place referral order by RN under : NA    New and Established Ostomies, if present place, referral order under : NA      Nurse 1 eSignature: Electronically signed by Shant Bronson RN on 2/1/23 at 6:32 AM EST    **SHARE this note so that the co-signing nurse is able to place an eSignature**    Nurse 2 eSignature: {Esignature:[id number]} APER CDU1

## 2023-02-01 NOTE — PROGRESS NOTES
Attempted to place NGT and was unsuccessful. Pt not tolerating, stating that he can't do this and that it is too painful. Explained to pt that it necessary to have the NGT placed. Pt's wife at bedside trying to calm patient. Will re-attempt soon.

## 2023-02-01 NOTE — PROGRESS NOTES
[facility]  Progress Note    2/1/2023 9:44 AM    Chief Complaint: AAA     POD #1 Open AAA Repair with Dr. Edelmira Trinidad    Subjective: Pt seen resting in bed in CVU, alert and oriented, wife at bedside, c/o back pain - chronic back issues with surgery in the past, c/o abdominal incisional pain    Vital Signs:  Vitals:    02/01/23 0700 02/01/23 0800 02/01/23 0830 02/01/23 0900   BP: 99/69 114/78  115/67   Pulse: (!) 127 (!) 115 (!) 112 (!) 119   Resp: 21 17 19 23   Temp:  97.6 °F (36.4 °C)     TempSrc:  Oral     SpO2: 92% 95% 94% 93%   Weight:       Height:           I/O:    Intake/Output Summary (Last 24 hours) at 2/1/2023 0944  Last data filed at 2/1/2023 0803  Gross per 24 hour   Intake 07987.01 ml   Output 7930 ml   Net 7256.01 ml       Physical Exam:   General: no apparent distress, alert and oriented, afebrile  Chest/Lungs: non labored breathing no tachypnea, retractions or cyanosis  Cardiac: irregular rate and rhythm  Abdomen: soft and nondistended, JESSICA dressing to midline incision with mild strike-through drainage, abdominal binder in place, tenderness surrounding mid-line incision  Extremities: normal strength, tone, and muscle mass, no deformities, no significant edema to BLE  Vascular: extremities warm and well perfused, no signs of cyanosis or ischemia  Pulses:  -R DP: +2/4 palpable  -L DP: +2/4 palpable    Labs:   Lab Results   Component Value Date/Time     02/01/2023 04:00 AM    K 4.5 02/01/2023 04:00 AM     02/01/2023 04:00 AM    CO2 23 02/01/2023 04:00 AM    BUN 24 02/01/2023 04:00 AM    CREATININE 1.9 02/01/2023 04:00 AM    GFRAA >60 09/13/2022 11:50 AM    GFRAA >60 04/26/2013 09:53 AM    LABGLOM 36 02/01/2023 04:00 AM    GLUCOSE 134 02/01/2023 04:00 AM    GLUCOSE 113 08/01/2011 08:50 AM    CALCIUM 8.0 02/01/2023 04:00 AM     Lab Results   Component Value Date/Time    WBC 11.7 02/01/2023 04:00 AM    RBC 2.77 02/01/2023 04:00 AM    HGB 8.1 02/01/2023 04:00 AM HCT 25.3 02/01/2023 04:00 AM    MCV 91.4 02/01/2023 04:00 AM    RDW 15.6 02/01/2023 04:00 AM     02/01/2023 04:00 AM     Lab Results   Component Value Date    INR 1.47 (H) 02/01/2023    PROTIME 17.8 (H) 02/01/2023        Scheduled Meds:    LORazepam  0.5 mg IntraVENous BID    enoxaparin Sodium  30 mg SubCUTAneous Daily    [START ON 2/4/2023] Levothyroxine Sodium  50 mcg IntraVENous Daily    sodium chloride flush  5-40 mL IntraVENous 2 times per day    nicotine  1 patch TransDERmal Daily     Continuous Infusions:    amiodarone 1 mg/min (02/01/23 0743)    Followed by    amiodarone      dextrose 5 % and 0.45 % NaCl 100 mL/hr at 02/01/23 0727    HYDROmorphone      sodium chloride      phenylephrine (SIDNEY-SYNEPHRINE) 50 mg/250 mL infusion 10 mcg/min (02/01/23 0600)    sodium chloride         Imaging:  CXR - 1/31/2023  Impression  No significant finding in the chest.    Assessment:   -POD #1 Open AAA Repair with Dr. Rashi Massey remains in place to mid-line incision with small amount of drainage noted  -UOP - 1500 overnight  -NG with 700 out overnight  -A-fib this AM  -On small dose of Sidney this AM - dose reduced overnight - hemodynamics improving    Plan:  -Amiodarone infusion and Cardiology consulted for A-fib  -Continue NG Tube  -Up to chair today  -Abdominal binder  -Encourage IS  -Ativan added  -PCA changed to Dilaudid     All pertinent information and plan of care discussed with Dr. Ron Martinez. All questions and concerns were addressed with the patient. I have discussed the above stated plan with the patient and the nurse. The patient verbalized understanding and agreed with the plan.     Thank you for allowing to us to participate in the care of Maureen Gan      Electronically signed by DEDRA Dinero CNP on 2/1/2023 at 9:44 AM

## 2023-02-01 NOTE — PROGRESS NOTES
Pt falling asleep and reaching for NGT unknowingly. Pt awakened and asked if he would be comfortable in wrist restraints while resting. Pt gladly agreed with the use of wrist restraints to prevent pulling out the NGT that Dr Ania Shelton came to bedside to place. Dr Ania Shelton and pt's wife, Alicia Asencio, made aware. Order received for soft bilateral wrist restraints.

## 2023-02-01 NOTE — PROGRESS NOTES
Pt pivoted to chair. NGT placement #2 attempted by another RN while pt in chair. Pt once again did not tolerate. Roslyn Coronel NP notified. NGT to remain out until further instruction. Pt now strictly NPO.

## 2023-02-01 NOTE — PROGRESS NOTES
Walked into pt's room and found NGT out of nose and lying on pt's chest. Pt fell asleep and states he must have pulled it out during that time. Pt remains AxOx4. Carlos Ash NP made aware. Order received to place new NGT and obtain xray.

## 2023-02-01 NOTE — OP NOTE
[address] [place] [address]                                OPERATIVE REPORT    PATIENT NAME: Toy Dakin                    :        1945  MED REC NO:   [id number]                          ROOM:       1310  ACCOUNT NO:   [de-identified]                           ADMIT DATE: 2023  PROVIDER:     Ron Fierro DO    DATE OF PROCEDURE:  2023    PREOPERATIVE DIAGNOSIS:  Abdominal aortic aneurysm greater than 5.5 cm. POSTOPERATIVE DIAGNOSIS:  Abdominal aortic aneurysm greater than 5.5 cm. OPERATION:  Open repair of abdominal aortic aneurysm using a  22 mm tube graft with supra-renal clamp. SURGEON:  Ron Fierro DO    ASSISTANT:  Michael Peace. ANESTHESIA:  General with TAP blocks. ESTIMATED BLOOD LOSS:  5500 mL with 3400 mL returned via Cell Saver. TOTAL IV FLUID:  4 liters of crystalloid combined with Cell Saver and  1250 of albumin. URINE OUTPUT:  240 mL. INDICATIONS:  This is a 26-year-old male patient who has an enlarged  abdominal aortic aneurysm. It has grown from 5.1 to 5.6 cm. He was not  a standard endograft candidate. However, he was a candidate for  fenestrated endograft. I discussed with him clearly about the  possibility of having a fenestrated endograft and the interval scan that  had to be done. He ultimately decided he did not want to have repeat  scans done despite counseling him about the upfront risks associated with open aortic surgery. He wanted to have one time repair. I discussed with him  open repair is quite more challenging on the body, at least up front. The risks, benefits and alternatives including pain, infection,  bleeding, embolization, thrombosis, MI, stroke, death, renal failure,  respiratory failure, intestinal/visceral organ injury, lymphatic leak were all clearly  reviewed with the patient.   I also discussed the issue of possibly  having damage to his sympathetic and parasympathetic nerves resulting in  retrograde ejaculation. This was all very clearly documented and  discussed beforehand. He signed written informed consent. OPERATIVE PROCEDURE:  The patient was brought into the operating room  and placed supine on the table. He underwent general anesthesia which  was uncomplicated. He received a central line, arterial line, a Gao  catheter and NG tube. Cell Saver was in the room. The patient's  abdomen and groins were clipped and he was prepped and draped using the  chlorhexidine-based solution. Once again all bony prominences were  appropriately padded. A time-out was called for indication, patient,  and laterality. We began by making a midline laparotomy. The incision  was over the entire course of his abdomen. Dissected down to the skin  and subcutaneous tissue and identified the midline raphe. This raphe  was then sharply incised and then the preperitoneal fat was carefully  incised using Metzenbaum scissors to identify the peritoneal cavity. After this, we slid our fingers safely and identified no adhesions and  then the entire laparotomy was completed. A quick examination  demonstrated there are no obvious problems in the intraabdominal  contents. The liver was healthy appearance, as well the stomach and as  well as the intestine. Small bowel was mobilized to the patient's right  and carefully the duodenum was sharply incised off of the  retroperitoneum. This was excised all the way up to the level of the  ligament of Treitz. After this, we then cleaned off the anterior wall  of the aorta. I made special care not to injure the actual wall itself. The GUILLERMINA was occluded on the CT scan. We then proceeded to bring up the  Omni retractor and we regained our exposure.   We did have to fight a lot  of lymphatic drainage during the retroperitoneal dissection  Unfortunately especially dissecting out the proximal aorta for clamping.  There was also significant amount of bleeding.  With the  cephalad dissection, ultimately we identified the renal vein.  Because  the aneurysm was somewhat conical and dilated up to the level of the  renal arteries, it was best to clamp from a suprarenal perspective in order to ensure healthy bites of aortic tissue.  We  therefore went ahead and isolated the renal vein.  We did ligate the  inferior lumbar branch.  Mobilized it and identified the right and left  renal arteries.  Distally, we decided that we would try to get away with  the tube graft.  Unfortunately, this terminal aorta and both iliac had  significant amount of calcium, so either way distal anastomosis  intraabdominally will be quite difficult and challenging to hold suture. Ligating the terminal aorta and performing a bifemoral bypass would also carry the same risk of bleeding and extend operative time.   At this point in time, we were able to isolate distally the bilateral  common iliac arteries.  We made special care to try not to incise one of  the tissue overlying the left common iliac artery just given the risk of  retrograde ejaculation associated with the nervous tissue damage.  Gave  the patient 5000 units of heparin.  We allowed to circulate for 3  minutes.  We then clamped in the suprarenal fashion in the aorta.  We  then incised the aorta.  There was still significant forward bleeding.   Another person had to scrub and just hold the clamp down.  After we got  some hemostasis, we were able to identify lumbars which were suture  ligated with silk suture.  The GUILLERMINA once again was already occluded; thrombus was covering its orifice.  After this, we then proceeded to yudelka  off the aorta.  We brought up a 22-mm graft.  We performed the proximal  anastomosis using a running 0 Prolene suture with a Dax strip to  buttress it.  After we released the clamp, unfortunately there was still  bleeding from the anterior lateral wall on the  patient's left side. This required additional repair suture with 3-0 Prolene. Eventually, we  were able to gain hemostasis. Total suprarenal clamp time was about 25  minutes. Once we were able to actually gain hemostasis, we were  satisfied. We then turned our attention to distal anastomosis. Both  iliac arteries were clamped carefully with Ad Hydragrip clamps. There was a little bit of crushing of the calcium you could feel it, but  unfortunately I do not think sewing to these diseased iliac vessels will be any better  than sewing to the terminal aorta. The posterior wall of the aorta has  a lot of calcium as well. Therefore, we decided to use a 0 Prolene  suture here as well. Distal anastomosis was performed after the graft  was beveled and measured. This was done also with a Dax strip. Unfortunately, after releasing this and tying it down and flushing  forward and backward which was excellent backbleeding from the legs, the  patient did have significant bleeding from the back wall of this  anastomosis. This is just likely due to the fact that the calcium did  not allow the suture to cinch down effectively. This is where most of  the blood loss occurred. We decided to go ahead and just continue to  buttress the aneurysm wall against the graft. We had to do this  multiple times in multiple different locations posteriorly on the right  and left lateral aspects of the actual grafts in order to get it to  seal.  Eventually, we gained hemostasis around the graft itself. The  patient did lose a fair amount of his intravascular volume as a  consequence. Therefore, we had to be very careful and slow with our  resuscitation and also try to gain hemostasis this way. The patient was  given 50 mg of Protamine. He has palpable femoral pulses and palpable  pedal pulses at this time.   Once we were reasonably satisfied with the  hemostasis, we then reapproximated the retroperitoneum to the aneurysm  wall itself just because the right lateral wall of the aneurysm was not  broad enough to actually reapproximate it with the actual graft itself without crimping and narrowing it. We made sure to isolate the duodenum from the actual aneurysm itself. We did irrigate with Irrisept prior to this. Hemostatic agent was  placed into the retroperitoneum. After this was done, we then proceeded  to put the abdominal contents back into its position. We then proceeded  to close the fascia on the abdominal wall using running 1-0 PDS suture  that was looped. The skin was stapled and a JESSICA dressing was placed on  the abdominal wall as well as an abdominal binder. The patient was  extubated in the operating room and at this point in time, he was  critical but stable. He will get more volume of course as necessary to  maintain his blood pressure. He was taken to the ICU and we will continue to  monitor from there. There were no immediate complications at this point  in time. All sponge and needle counts were correct x2.         Peg Castillo DO    D: 01/31/2023 12:38:10       T: 01/31/2023 17:58:52     [email or web address]  Job#: [id number]     Doc#: [id number]    CC:

## 2023-02-01 NOTE — PROGRESS NOTES
At 4255, patient had a long run of SVT on monitor, rate 160s. After rate decreased, converted to atrial fibrillation rate 110s-130s. Dr. Raymond Delgado called. EKG, Amio gtt, and Cardiology c/s ordered. Will perform interventions ordered and continue to monitor.     Dimitris Ba RN

## 2023-02-01 NOTE — PLAN OF CARE
Problem: Discharge Planning  Goal: Discharge to home or other facility with appropriate resources  Outcome: Progressing  Flowsheets (Taken 1/31/2023 2000)  Discharge to home or other facility with appropriate resources:   Identify barriers to discharge with patient and caregiver   Arrange for needed discharge resources and transportation as appropriate   Identify discharge learning needs (meds, wound care, etc)   Arrange for interpreters to assist at discharge as needed   Refer to discharge planning if patient needs post-hospital services based on physician order or complex needs related to functional status, cognitive ability or social support system     Problem: Pain  Goal: Verbalizes/displays adequate comfort level or baseline comfort level  1/31/2023 2036 by Rut Borja RN  Outcome: Progressing  Flowsheets (Taken 1/31/2023 2000)  Verbalizes/displays adequate comfort level or baseline comfort level:   Encourage patient to monitor pain and request assistance   Assess pain using appropriate pain scale   Administer analgesics based on type and severity of pain and evaluate response   Implement non-pharmacological measures as appropriate and evaluate response   Consider cultural and social influences on pain and pain management   Notify Licensed Independent Practitioner if interventions unsuccessful or patient reports new pain  1/31/2023 1850 by Monika Rodriguez RN  Outcome: Progressing  Note: Pt with c/o pain this afternoon r/t surgical incisions. PRN and PCA pain medications administered will mointor      Problem: Safety - Adult  Goal: Free from fall injury  1/31/2023 2036 by Rut Borja RN  Outcome: Progressing  1/31/2023 1850 by Monika Rodriguez RN  Outcome: Progressing  Note: Pt is a fall risk. Fall risk protocol in place. See Jose Luis Knox Fall Score. Pt bed is in low position, bed alarm is on, side rails up, fall risk bracelet applied. , non-skid footwear in use.  Patient/family educated on fall risk protocol, instructed to call for assistance when needed and belongings are in reach. assistance. Will continue with hourly rounds for po intake, pain needs, toileting and repositioning as needed. Will continue to monitor for needs. Problem: Skin/Tissue Integrity  Goal: Absence of new skin breakdown  Description: 1. Monitor for areas of redness and/or skin breakdown  2. Assess vascular access sites hourly  3. Every 4-6 hours minimum:  Change oxygen saturation probe site  4. Every 4-6 hours:  If on nasal continuous positive airway pressure, respiratory therapy assess nares and determine need for appliance change or resting period. 1/31/2023 2036 by Vishal Moffett RN  Outcome: Progressing  1/31/2023 1850 by Leroy Zaldivar RN  Outcome: Progressing  Note: Pt at risk for skin breakdown. See Kristian score. Pt remains on bedrest. Unable to reposition self in bed. Heels elevated off bed. Sacral heart mepilex intact to protect, site inspected and intact underneath. Will continue to turn and reposition patient every two hours and as needed. Will continue to keep patient clean and dry, applying skin care cream as needed. Pillows used for repositioning q2hs. Will continue to monitor and assess for skin breakdown.

## 2023-02-01 NOTE — TELEPHONE ENCOUNTER
Gracie and patient requesting an order for wrist restraints. When the patient falls asleep, he reaches for the NG tube. As per Dr Lacho Reyes, okay for either wrist restraints or to bridle the NG tube. Gracie informed.

## 2023-02-01 NOTE — PROGRESS NOTES
Narcotic Waste Documentation    Administered 10 ml of 50ml and wasted 40 ml of fentanyl syringe per [facility] . Electronically signed by Yuniel Mane RN on 2/1/2023 at 9:10 AM   with her/his documentation of Basim Aguilar. I have reviewed the notes, assessments, and/or procedures performed by Ari Eastman, I concur with her/his documentation of Basim Aguilar.

## 2023-02-01 NOTE — CONSULTS
Cardiac Electrophysiology Consultation   Date: 2/1/2023  Admit Date:  1/31/2023  Reason for Consultation: Atrial fibrillation  Consult Requesting Physician: Igor Mauricio DO     HPI: Ronit Wilson is a 68 y.o. with past medical history noted below who is s/p open repair of abdominal aortic aneurysm using a 22 mm tube graft with supra-renal clamp 1/31/2022. Total suprarenal clamp time was about 25 minutes. EP/cardiology consulted this morning for post op atrial fibrillation with RVR    Per the patient and his wife (who has atrial fibrillation), he's never been diagnosed with atrial fibrillation or other cardiac illnesses. He is not aware that he is in atrial fibrillation. Past Medical History:   Diagnosis Date    AAA (abdominal aortic aneurysm)     Anxiety     Depression     Hyperlipidemia     Hypertension     Hypothyroidism     Iliac artery stenosis, left ([address] 09/16/2021        Past Surgical History:   Procedure Laterality Date    ABDOMINAL AORTIC ANEURYSM REPAIR N/A 1/31/2023    DIRECT REPAIR OF INFRARENAL ABDOMINAL AORTIC ANEURYSM performed by Igor Mauricio DO at [address] N/A 05/13/2021    EXAM UNDER ANESTHESIA, BOTOX INJECTION ANAL FISSURE performed by Augustina Ayon MD at [address]  09/12/2014    Dr Eduardo Rowland / Yasmine Gagnon HISTORY  01/29/2015    neck cyst excision       Allergies   Allergen Reactions    Lidocaine Other (See Comments)     Burning sensation         Social History:  Reviewed. reports that he has been smoking cigarettes. He has a 26.50 pack-year smoking history. He has never used smokeless tobacco. He reports current alcohol use. He reports that he does not use drugs. Family History:  Reviewed. family history includes Heart Attack in his mother; Heart Disease in his mother; High Cholesterol in his mother. He was adopted. No premature CAD.      Review of System:  Pertinent positives and negatives are mentioned in the HPI.  The rest of the systems are negative. Physical Examination:  Vitals:    23 1100   BP: 106/71   Pulse: (!) 112   Resp: 19   Temp:    SpO2: (!) 87%        Intake/Output Summary (Last 24 hours) at 2023 1147  Last data filed at 2023 1116  Gross per 24 hour   Intake 7136.01 ml   Output 4665 ml   Net 2471.01 ml     In: 78845 [I.V.:8404.7; Blood:4112.7]  Out: 11813    Wt Readings from Last 3 Encounters:   23 208 lb 12.4 oz (94.7 kg)   01/10/23 197 lb 3.2 oz (89.4 kg)   22 193 lb (87.5 kg)     Temp  Av °F (36.1 °C)  Min: 96 °F (35.6 °C)  Max: 98.9 °F (37.2 °C)  Pulse  Av.5  Min: 77  Max: 127  BP  Min: 77/57  Max: 124/69  SpO2  Av.1 %  Min: 87 %  Max: 100 %    Telemetry: Atrial fibrillation with RVR  Constitutional: Alert. Oriented to person, place, and time. No distress. Head: Normocephalic and atraumatic. Mouth/Throat: Lips appear moist. Oropharynx is clear and moist.  Eyes: Conjunctivae normal. EOM are normal.   Neck: Neck supple. Supple, no JVD  Cardiovascular: Irregularly irregular  Pulmonary/Chest: Diminished at bases. Abdominal: Soft. Tender post operation. Musculoskeletal:No pitting edema . Neurological: Alert and oriented. Skin: Skin is warm and dry. [place] [employer] Psychiatric: No anxiety nor agitation. Labs:  Reviewed.    Recent Labs     23  0600 23  1548 23  0400    149* 152*   K 4.3 4.2 4.5    113* 117*   CO2    BUN 24* 20 24*   CREATININE 1.2 1.4* 1.9*     Recent Labs     23  1045 23  1548 23  2000 23  0400   WBC 14.9* 18.8*  --  11.7*   HGB 10.1* 11.7* 9.1* 8.1*   HCT 32.0* 35.8* 28.8* 25.3*   MCV 92.4 91.7  --  91.4   PLT 92* 178  --  116*     No results found for: CKTOTAL, CKMB, CKMBINDEX, TROPONINI  No results found for: BNP  Lab Results   Component Value Date/Time    PROTIME 17.8 2023 04:00 AM    PROTIME 17.3 2023 03:48 PM    PROTIME 20.4 2023 10:45 AM    INR 1.47 2023 04:00 AM    INR 1.42 2023 03:48 PM    INR 1.75 2023 10:45 AM     Lab Results   Component Value Date/Time    CHOL 164 2022 11:50 AM    HDL 42 2022 11:50 AM    HDL 46 2012 01:06 PM    TRIG 181 2022 11:50 AM       Diagnostic and imaging results reviewed. EC2023 showed atrial fibrillation with RVR. ST kwaku[resspom anterior leads  Echo: none in our system  Cath: none    I independently reviewed and interpreted the ECG, telemetry, serology, echocardiographic results.     Scheduled Meds:   LORazepam  0.5 mg IntraVENous BID    enoxaparin Sodium  30 mg SubCUTAneous Daily    [START ON 2023] Levothyroxine Sodium  50 mcg IntraVENous Daily    sodium chloride flush  5-40 mL IntraVENous 2 times per day    nicotine  1 patch TransDERmal Daily     Continuous Infusions:   amiodarone 1 mg/min (23 0743)    Followed by    amiodarone      dextrose 5 % and 0.45 % NaCl 100 mL/hr at 23 8784    HYDROmorphone      sodium chloride      phenylephrine (JAIME-SYNEPHRINE) 50 mg/250 mL infusion 10 mcg/min (23 0600)    sodium chloride       PRN Meds:.naloxone, polyethylene glycol, sodium chloride flush, sodium chloride, ondansetron **OR** ondansetron, albumin human-kjda, sodium chloride, metoprolol     Problem List:   Patient Active Problem List    Diagnosis Date Noted    Abdominal aortic aneurysm (AAA) greater than 5.5 cm in diameter in male 2023    Preop examination 01/10/2023    Anal fissure     Abdominal aortic aneurysm (AAA) without rupture 03/10/2021    Monoclonal paraproteinemia 2021    Tobacco abuse 12/10/2019    Pulmonary emphysema ([facility] [address] 2016    Hyperglycemia 2016    BPH w/o urinary obs/LUTS 2015    Renal cyst 2014    HTN (hypertension)     Hypertrophy of prostate without urinary obstruction and other lower urinary tract symptoms (LUTS)     Hypercholesterolemia     Depression       Active Hospital Problems    Diagnosis Date Noted Abdominal aortic aneurysm (AAA) greater than 5.5 cm in diameter in male [I71.40] 01/31/2023     Priority: Medium     [id number] Score for Atrial Fibrillation Stroke Risk   Risk   Factors  Component Value   C CHF No 0   H HTN Yes 1   A2 Age >= 76 Yes,  (79 y.o.) 2   D DM No 0   S2 Prior Stroke/TIA No 0   V Vascular Disease Yes 1   A Age 74-69 No,  (79 y.o.) 0   Sc Sex male 0    [id number]  Score  4   Score last updated 2/1/23 07:34 PM EST    Click here for a link to the UpToDate guideline \"Atrial Fibrillation: Anticoagulation therapy to prevent embolization    Disclaimer: Risk Score calculation is dependent on accuracy of patient problem list and past encounter diagnosis. Consultation Assessment and Recommendation(s):  Newly diagnosed atrial fibrillation with RVR   POD1 s/p open abdominal aortic aneurysm repair. Hypertension    Mr. Teri Lipscomb has post-op atrial fibrillation with RVR. ECG has st-depression anteriorly (not specific and has no recent ECG to compare. Did not have significant aortic clamp time. Most post surgical atrial fibrillation goes away by 6 weeks after procedure.    - agree with amiodarone for now  - get a TTE to assess LV systolic function.   - If lasts over 48 hrs, will be at the discretion of surgery when it would be appropriate to start anticoagulation for thromboembolic risk reduction. - long term would do a 30 day event monitor after he has fully recovered to see if this was incidental post-afib (most likely) or had undiagnosed paroxysmal atrial fibrillation. Thank you for allowing me to participate in the care of Kristyn Zamora . If you have any questions/comments, please do not hesitate to contact us.       Electronically signed by Barron Lomeli MD on 2/1/2023 at 11:47 AM  Cardiac Electrophysiology  [address]

## 2023-02-02 ENCOUNTER — APPOINTMENT (OUTPATIENT)
Dept: GENERAL RADIOLOGY | Age: 78
DRG: 270 | End: 2023-02-02
Attending: STUDENT IN AN ORGANIZED HEALTH CARE EDUCATION/TRAINING PROGRAM
Payer: MEDICARE

## 2023-02-02 PROBLEM — I48.0 PAF (PAROXYSMAL ATRIAL FIBRILLATION) (HCC): Status: ACTIVE | Noted: 2023-02-02

## 2023-02-02 LAB
ANION GAP SERPL CALCULATED.3IONS-SCNC: 12 MMOL/L (ref 3–16)
BUN BLDV-MCNC: 26 MG/DL (ref 7–20)
CALCIUM SERPL-MCNC: 8 MG/DL (ref 8.3–10.6)
CHLORIDE BLD-SCNC: 113 MMOL/L (ref 99–110)
CO2: 23 MMOL/L (ref 21–32)
CREAT SERPL-MCNC: 1.5 MG/DL (ref 0.8–1.3)
GFR SERPL CREATININE-BSD FRML MDRD: 47 ML/MIN/{1.73_M2}
GLUCOSE BLD-MCNC: 129 MG/DL (ref 70–99)
GLUCOSE BLD-MCNC: 134 MG/DL (ref 70–99)
HCT VFR BLD CALC: 23.4 % (ref 40.5–52.5)
HEMOGLOBIN: 7.4 G/DL (ref 13.5–17.5)
MAGNESIUM: 1.5 MG/DL (ref 1.8–2.4)
MCH RBC QN AUTO: 29.1 PG (ref 26–34)
MCHC RBC AUTO-ENTMCNC: 31.8 G/DL (ref 31–36)
MCV RBC AUTO: 91.6 FL (ref 80–100)
PDW BLD-RTO: 15.6 % (ref 12.4–15.4)
PERFORMED ON: ABNORMAL
PLATELET # BLD: 104 K/UL (ref 135–450)
PMV BLD AUTO: 8.3 FL (ref 5–10.5)
POTASSIUM SERPL-SCNC: 3.7 MMOL/L (ref 3.5–5.1)
RBC # BLD: 2.55 M/UL (ref 4.2–5.9)
SODIUM BLD-SCNC: 148 MMOL/L (ref 136–145)
WBC # BLD: 13.9 K/UL (ref 4–11)

## 2023-02-02 PROCEDURE — 2100000000 HC CCU R&B

## 2023-02-02 PROCEDURE — APPNB45 APP NON BILLABLE 31-45 MINUTES: Performed by: NURSE PRACTITIONER

## 2023-02-02 PROCEDURE — 2580000003 HC RX 258: Performed by: NURSE PRACTITIONER

## 2023-02-02 PROCEDURE — 2580000003 HC RX 258: Performed by: STUDENT IN AN ORGANIZED HEALTH CARE EDUCATION/TRAINING PROGRAM

## 2023-02-02 PROCEDURE — 2500000003 HC RX 250 WO HCPCS: Performed by: NURSE PRACTITIONER

## 2023-02-02 PROCEDURE — 83735 ASSAY OF MAGNESIUM: CPT

## 2023-02-02 PROCEDURE — 99233 SBSQ HOSP IP/OBS HIGH 50: CPT | Performed by: NURSE PRACTITIONER

## 2023-02-02 PROCEDURE — 6360000002 HC RX W HCPCS: Performed by: NURSE PRACTITIONER

## 2023-02-02 PROCEDURE — 37799 UNLISTED PX VASCULAR SURGERY: CPT

## 2023-02-02 PROCEDURE — 6360000002 HC RX W HCPCS: Performed by: STUDENT IN AN ORGANIZED HEALTH CARE EDUCATION/TRAINING PROGRAM

## 2023-02-02 PROCEDURE — 6370000000 HC RX 637 (ALT 250 FOR IP): Performed by: NURSE PRACTITIONER

## 2023-02-02 PROCEDURE — 6370000000 HC RX 637 (ALT 250 FOR IP): Performed by: STUDENT IN AN ORGANIZED HEALTH CARE EDUCATION/TRAINING PROGRAM

## 2023-02-02 PROCEDURE — 85027 COMPLETE CBC AUTOMATED: CPT

## 2023-02-02 PROCEDURE — 2500000003 HC RX 250 WO HCPCS: Performed by: STUDENT IN AN ORGANIZED HEALTH CARE EDUCATION/TRAINING PROGRAM

## 2023-02-02 PROCEDURE — 71045 X-RAY EXAM CHEST 1 VIEW: CPT

## 2023-02-02 PROCEDURE — 80048 BASIC METABOLIC PNL TOTAL CA: CPT

## 2023-02-02 RX ORDER — AMIODARONE HYDROCHLORIDE 200 MG/1
200 TABLET ORAL DAILY
Status: DISCONTINUED | OUTPATIENT
Start: 2023-02-10 | End: 2023-02-07 | Stop reason: HOSPADM

## 2023-02-02 RX ORDER — AMIODARONE HYDROCHLORIDE 200 MG/1
400 TABLET ORAL 2 TIMES DAILY
Status: DISCONTINUED | OUTPATIENT
Start: 2023-02-02 | End: 2023-02-07 | Stop reason: HOSPADM

## 2023-02-02 RX ORDER — HYDRALAZINE HYDROCHLORIDE 20 MG/ML
10 INJECTION INTRAMUSCULAR; INTRAVENOUS EVERY 4 HOURS PRN
Status: DISCONTINUED | OUTPATIENT
Start: 2023-02-02 | End: 2023-02-06

## 2023-02-02 RX ORDER — AMLODIPINE BESYLATE 10 MG/1
10 TABLET ORAL DAILY
Status: DISCONTINUED | OUTPATIENT
Start: 2023-02-02 | End: 2023-02-07 | Stop reason: HOSPADM

## 2023-02-02 RX ORDER — GABAPENTIN 300 MG/1
300 CAPSULE ORAL 3 TIMES DAILY
Status: DISCONTINUED | OUTPATIENT
Start: 2023-02-02 | End: 2023-02-07 | Stop reason: HOSPADM

## 2023-02-02 RX ORDER — LORAZEPAM 2 MG/ML
1 INJECTION INTRAMUSCULAR 2 TIMES DAILY
Status: DISCONTINUED | OUTPATIENT
Start: 2023-02-02 | End: 2023-02-02

## 2023-02-02 RX ORDER — HALOPERIDOL 5 MG/ML
1 INJECTION INTRAMUSCULAR ONCE
Status: COMPLETED | OUTPATIENT
Start: 2023-02-02 | End: 2023-02-02

## 2023-02-02 RX ORDER — MAGNESIUM SULFATE IN WATER 40 MG/ML
2000 INJECTION, SOLUTION INTRAVENOUS ONCE
Status: COMPLETED | OUTPATIENT
Start: 2023-02-02 | End: 2023-02-02

## 2023-02-02 RX ORDER — DEXMEDETOMIDINE HYDROCHLORIDE 4 UG/ML
.1-1.5 INJECTION, SOLUTION INTRAVENOUS CONTINUOUS
Status: DISCONTINUED | OUTPATIENT
Start: 2023-02-02 | End: 2023-02-06

## 2023-02-02 RX ORDER — HALOPERIDOL 5 MG/ML
1 INJECTION INTRAMUSCULAR EVERY 8 HOURS PRN
Status: DISCONTINUED | OUTPATIENT
Start: 2023-02-02 | End: 2023-02-02

## 2023-02-02 RX ORDER — POTASSIUM CHLORIDE 29.8 MG/ML
20 INJECTION INTRAVENOUS ONCE
Status: COMPLETED | OUTPATIENT
Start: 2023-02-02 | End: 2023-02-02

## 2023-02-02 RX ORDER — POTASSIUM CHLORIDE 750 MG/1
20 TABLET, FILM COATED, EXTENDED RELEASE ORAL ONCE
Status: DISCONTINUED | OUTPATIENT
Start: 2023-02-02 | End: 2023-02-02

## 2023-02-02 RX ORDER — METOPROLOL TARTRATE 5 MG/5ML
10 INJECTION INTRAVENOUS EVERY 4 HOURS PRN
Status: DISCONTINUED | OUTPATIENT
Start: 2023-02-02 | End: 2023-02-07 | Stop reason: HOSPADM

## 2023-02-02 RX ORDER — ESCITALOPRAM OXALATE 10 MG/1
15 TABLET ORAL DAILY
Status: DISCONTINUED | OUTPATIENT
Start: 2023-02-02 | End: 2023-02-03

## 2023-02-02 RX ADMIN — METOPROLOL TARTRATE 2.5 MG: 5 INJECTION INTRAVENOUS at 15:35

## 2023-02-02 RX ADMIN — ESCITALOPRAM OXALATE 15 MG: 10 TABLET, FILM COATED ORAL at 08:06

## 2023-02-02 RX ADMIN — LORAZEPAM 0.5 MG: 2 INJECTION INTRAMUSCULAR; INTRAVENOUS at 07:22

## 2023-02-02 RX ADMIN — DEXMEDETOMIDINE HYDROCHLORIDE 0.2 MCG/KG/HR: 400 INJECTION INTRAVENOUS at 14:51

## 2023-02-02 RX ADMIN — MAGNESIUM SULFATE HEPTAHYDRATE 2000 MG: 40 INJECTION, SOLUTION INTRAVENOUS at 08:01

## 2023-02-02 RX ADMIN — POTASSIUM CHLORIDE 20 MEQ: 29.8 INJECTION, SOLUTION INTRAVENOUS at 09:29

## 2023-02-02 RX ADMIN — SODIUM CHLORIDE: 9 INJECTION, SOLUTION INTRAVENOUS at 09:27

## 2023-02-02 RX ADMIN — METOPROLOL TARTRATE 2.5 MG: 5 INJECTION INTRAVENOUS at 08:36

## 2023-02-02 RX ADMIN — METOPROLOL TARTRATE 25 MG: 25 TABLET, FILM COATED ORAL at 20:06

## 2023-02-02 RX ADMIN — Medication 1 SPRAY: at 01:34

## 2023-02-02 RX ADMIN — HYDROMORPHONE HYDROCHLORIDE 1 MG: 1 INJECTION, SOLUTION INTRAMUSCULAR; INTRAVENOUS; SUBCUTANEOUS at 14:57

## 2023-02-02 RX ADMIN — ENOXAPARIN SODIUM 30 MG: 100 INJECTION SUBCUTANEOUS at 07:55

## 2023-02-02 RX ADMIN — HALOPERIDOL LACTATE 1 MG: 5 INJECTION, SOLUTION INTRAMUSCULAR at 07:53

## 2023-02-02 RX ADMIN — DEXTROSE AND SODIUM CHLORIDE: 5; 450 INJECTION, SOLUTION INTRAVENOUS at 20:24

## 2023-02-02 RX ADMIN — AMLODIPINE BESYLATE 10 MG: 10 TABLET ORAL at 16:39

## 2023-02-02 RX ADMIN — METOPROLOL TARTRATE 25 MG: 25 TABLET, FILM COATED ORAL at 09:32

## 2023-02-02 RX ADMIN — Medication 1 SPRAY: at 14:59

## 2023-02-02 RX ADMIN — DEXMEDETOMIDINE HYDROCHLORIDE 1.5 MCG/KG/HR: 400 INJECTION INTRAVENOUS at 19:17

## 2023-02-02 RX ADMIN — HYDROMORPHONE HYDROCHLORIDE 1 MG: 1 INJECTION, SOLUTION INTRAMUSCULAR; INTRAVENOUS; SUBCUTANEOUS at 18:22

## 2023-02-02 RX ADMIN — AMIODARONE HYDROCHLORIDE 0.5 MG/MIN: 50 INJECTION, SOLUTION INTRAVENOUS at 09:07

## 2023-02-02 RX ADMIN — Medication 10 MG: at 20:06

## 2023-02-02 RX ADMIN — Medication 10 MG: at 07:56

## 2023-02-02 RX ADMIN — GABAPENTIN 300 MG: 300 CAPSULE ORAL at 20:06

## 2023-02-02 RX ADMIN — DEXTROSE AND SODIUM CHLORIDE: 5; 450 INJECTION, SOLUTION INTRAVENOUS at 04:08

## 2023-02-02 RX ADMIN — DEXMEDETOMIDINE HYDROCHLORIDE 1.5 MCG/KG/HR: 400 INJECTION INTRAVENOUS at 22:10

## 2023-02-02 RX ADMIN — SODIUM CHLORIDE, PRESERVATIVE FREE 10 ML: 5 INJECTION INTRAVENOUS at 07:58

## 2023-02-02 RX ADMIN — AMIODARONE HYDROCHLORIDE 400 MG: 200 TABLET ORAL at 20:06

## 2023-02-02 RX ADMIN — GABAPENTIN 300 MG: 300 CAPSULE ORAL at 13:18

## 2023-02-02 RX ADMIN — AMIODARONE HYDROCHLORIDE 0.5 MG/MIN: 50 INJECTION, SOLUTION INTRAVENOUS at 11:45

## 2023-02-02 RX ADMIN — SODIUM CHLORIDE, PRESERVATIVE FREE 10 ML: 5 INJECTION INTRAVENOUS at 20:07

## 2023-02-02 ASSESSMENT — PAIN DESCRIPTION - LOCATION
LOCATION: ABDOMEN
LOCATION: ABDOMEN;PENIS
LOCATION: ABDOMEN

## 2023-02-02 ASSESSMENT — PAIN DESCRIPTION - DESCRIPTORS: DESCRIPTORS: SHARP

## 2023-02-02 ASSESSMENT — PAIN SCALES - GENERAL
PAINLEVEL_OUTOF10: 3
PAINLEVEL_OUTOF10: 0
PAINLEVEL_OUTOF10: 9
PAINLEVEL_OUTOF10: 9

## 2023-02-02 ASSESSMENT — PAIN DESCRIPTION - PAIN TYPE: TYPE: SURGICAL PAIN

## 2023-02-02 ASSESSMENT — PAIN DESCRIPTION - ORIENTATION
ORIENTATION: MID
ORIENTATION: OTHER (COMMENT)

## 2023-02-02 ASSESSMENT — PAIN - FUNCTIONAL ASSESSMENT: PAIN_FUNCTIONAL_ASSESSMENT: PREVENTS OR INTERFERES SOME ACTIVE ACTIVITIES AND ADLS

## 2023-02-02 NOTE — PROGRESS NOTES
Dr. Ralf Pierson didn't call this RN back. This RN left a voicemail to GISELLA Cross regarding the patient's increasing confusion, agitation, and max temperature of 101.7 F. This RN will wait for GISELLA Hull's call-back.     Electronically signed by Carlos Treviño RN on 2/2/2023 at 6:54 AM

## 2023-02-02 NOTE — PROGRESS NOTES
Cardiac Electrophysiology Progress Note     Admit Date: 2023     Reason for follow up: atrial fibrillation with RVR    HPI and Interval History:   Parth Muller is a 68 y.o. with past medical history noted below who is s/p open repair of abdominal aortic aneurysm using a 22 mm tube graft with supra-renal clamp 2022. Total suprarenal clamp time was about 25 minutes. EP/cardiology consulted this morning for post op atrial fibrillation with RVR     Per the patient and his wife (who has atrial fibrillation), he's never been diagnosed with atrial fibrillation or other cardiac illnesses. He is not aware that he is in atrial fibrillation. Delirious this morning, not oriented or able to take PO. Converted to SR around 0230 on . Wife at bedside, discussed echo findings. Physical Examination:  Vitals:    23 0800   BP:    Pulse: 92   Resp: 20   Temp:    SpO2: (!) 77%        Intake/Output Summary (Last 24 hours) at 2023 0824  Last data filed at 2023 0800  Gross per 24 hour   Intake 3592.43 ml   Output 3440 ml   Net 152.43 ml     In: 3592.4 [P.O.:560; I.V.:3032.4]  Out: 4140    Wt Readings from Last 3 Encounters:   23 207 lb 14.3 oz (94.3 kg)   01/10/23 197 lb 3.2 oz (89.4 kg)   22 193 lb (87.5 kg)     Temp  Av.5 °F (37.5 °C)  Min: 98.3 °F (36.8 °C)  Max: 101.7 °F (38.7 °C)  Pulse  Av.9  Min: 86  Max: 119  BP  Min: 83/63  Max: 136/62  SpO2  Av.7 %  Min: 77 %  Max: 95 %    Telemetry: Sinus rhythm in the 80s. Constitutional: Alert, in no acute distress. Appears stated age. Head: Normocephalic and atraumatic. Eyes: Conjunctivae normal. EOM are normal.   Neck: Neck supple. No lymphadenopathy. No rigidity. No JVD present. Cardiovascular: Normal rate, regular rhythm. No murmurs, rubs or gallops. No S3 or S4.  Pulmonary/Chest: Clear breath sounds bilaterally. No crackles, wheezes or rhonchi. No respiratory accessory muscle use. Abdominal: Soft.  Normal bowel sounds present. No distension, No tenderness. Musculoskeletal: No tenderness. No edema    Lymphadenopathy: Has no cervical adenopathy. Neurological: Alert and confused. Unable to follow commands. Skin: Skin is warm and dry. No rash, lesions, ulcerations noted. Psychiatric: Agitated. Labs, diagnostic and imaging results reviewed. Reviewed. Recent Labs     01/31/23  1548 02/01/23  0400 02/02/23 0422   * 152* 148*   K 4.2 4.5 3.7   * 117* 113*   CO2 23 23 23   BUN 20 24* 26*   CREATININE 1.4* 1.9* 1.5*     Recent Labs     01/31/23  1548 01/31/23  2000 02/01/23 0400 02/02/23 0422   WBC 18.8*  --  11.7* 13.9*   HGB 11.7* 9.1* 8.1* 7.4*   HCT 35.8* 28.8* 25.3* 23.4*   MCV 91.7  --  91.4 91.6     --  116* 104*     No results found for: CKTOTAL, CKMB, CKMBINDEX, TROPONINI  Estimated Creatinine Clearance: 48 mL/min (A) (based on SCr of 1.5 mg/dL (H)).    No results found for: BNP  Lab Results   Component Value Date/Time    PROTIME 17.8 02/01/2023 04:00 AM    PROTIME 17.3 01/31/2023 03:48 PM    PROTIME 20.4 01/31/2023 10:45 AM    INR 1.47 02/01/2023 04:00 AM    INR 1.42 01/31/2023 03:48 PM    INR 1.75 01/31/2023 10:45 AM     Lab Results   Component Value Date/Time    CHOL 164 09/13/2022 11:50 AM    HDL 42 09/13/2022 11:50 AM    HDL 46 04/18/2012 01:06 PM    TRIG 181 09/13/2022 11:50 AM       Scheduled Meds:   magnesium sulfate  2,000 mg IntraVENous Once    escitalopram  15 mg Oral Daily    LORazepam  0.5 mg IntraVENous BID    enoxaparin Sodium  30 mg SubCUTAneous Daily    famotidine (PEPCID) injection  10 mg IntraVENous BID    [START ON 2/4/2023] Levothyroxine Sodium  50 mcg IntraVENous Daily    sodium chloride flush  5-40 mL IntraVENous 2 times per day    nicotine  1 patch TransDERmal Daily     Continuous Infusions:   amiodarone 0.5 mg/min (02/02/23 0617)    dextrose 5 % and 0.45 % NaCl 50 mL/hr at 02/02/23 0758    HYDROmorphone      sodium chloride      phenylephrine (JAIME-SYNEPHRINE) 50 mg/250 mL infusion 10 mcg/min (23 0600)    sodium chloride       PRN Meds:naloxone, polyethylene glycol, perflutren lipid microspheres, phenol, sodium chloride flush, sodium chloride, ondansetron **OR** ondansetron, sodium chloride, metoprolol     EC23  Atrial fibrillation at 120 BPM. Low voltage. Non-specific ST-T wave changes. Echo:23  Left ventricular cavity size is normal.   There is mildly increased left ventricular wall thickness. Overall left ventricular systolic function appears hyperdynamic. Ejection fraction is visually estimated to be 65-70%. Indeterminate diastolic function. Mitral valve leaflets appear mildly thickened. Trivial mitral regurgitation. .   The left atrium is moderately dilated. The right ventricle is normal in size and function. Mild to moderate tricuspid regurgitation. Stress: 1/3/23   The overall quality of the study is good. There is subdiaphragmatic    attenuation. Left ventricular cavity size is mildly dilated. Right ventricle is normal in    size. SPECT images demonstrate homogeneous tracer distribution throughout the    myocardium. There is normal isotope uptake at stress and rest. There is no    evidence of myocardial ischemia or scar. Gated spect reveals normal    myocardial thickening and wall motion. Sum stress score of 0. No visual TID. Calculated TID 1. 13. Left ventricular ejection is normal at 67%. Sensitivity of testing is reduced on betablocker. **Myocardial perfusion is normal. No reversible ischemia. Low-moderate risk    scan given mildly dilated LV volume. **       Assessment and Plan:     Paroxysmal atrial fibrillation   - first seen on EKG on 23 following AAA repair   - converted to SR on amiodarone after about 20 hours of duration   - echo with moderately dilated LA so may not be a new problem   - continue IV amiodarone for now until more oriented and able to take PO and then transition to PO   - can resume metoprolol   - CHADS2-VASc 4 (age, HTN, vascular disease) given brief duration while post-op, can hold off an anticoagulation for now but if has recurrence, will need to consider DOAC  - replaced Mg and K   - plan for 30 day monitor at f/u once healed from surgery    AAA without rupture   - s/p open AAA repair on 1/31/23   - care per vascular    Essential HTN   - BP elevated at times, some may be related to agitation   - okay to resume metoprolol and may need other home BP meds resumed but will defer to vascular      DEDRA Chapman  [facility], [address] [place] [address], [address] [place] [address]  Phone: [phone]  Fax: [phone]    Electronically signed by DEDRA Ho - CNP on 2/2/2023 at 8:24 AM

## 2023-02-02 NOTE — PROGRESS NOTES
4 Eyes Skin Assessment     NAME:  Guilherme Stuart  YOB: 1945  MEDICAL RECORD NUMBER:  [id number]    The patient is being assessed for  Shift Handoff    I agree that One RN have performed a thorough Head to Toe Skin Assessment on the patient. ALL assessment sites listed below have been assessed. Areas assessed by both nurses:    Head, Face, Ears, Shoulders, Back, Chest, Arms, Elbows, Hands, Sacrum. Buttock, Coccyx, Ischium, and Legs. Feet and Heels        Does the Patient have a Wound?  No noted wound(s) (no pressure injury, just surgical wound)       Kristian Prevention initiated by RN: Yes   Wound Care Orders initiated by RN: NA    Pressure Injury (Stage 3,4, Unstageable, DTI, NWPT, and Complex wounds) if present place referral order by RN under : NA    New and Established Ostomies, if present place, referral order under : NA      Nurse 1 eSignature: Electronically signed by Griselda Lauren RN on 2/2/23 at 7:47 AM EST    **SHARE this note so that the co-signing nurse is able to place an eSignature**    Nurse 2 eSignature: Electronically signed by Roya Sanders RN on 2/2/23 at 9:16 AM EST

## 2023-02-02 NOTE — PROGRESS NOTES
Late entry due to patient care. @ 2055 to 2130 - Shift assessment completed. He's oriented to himself and to the current month and year, he's disoriented to place and situation, forgetful, follows commands, moves all of his extremities, w/ palpable pulses, and his skin is warm and dry. This RN re-oriented him w/ the current situation and where he is right now. This RN also explained to him the plan of care for this shift and he verbalized understanding. This RN noticed that he has a congested but non-productive cough. This RN guided him on doing the IS and Aerobika OPEP. He was able to do an average of 500 cc (x10 times) on the IS. He coughed, but still not expectorated anything. His head-of-bed remained on at least 30 degrees elevation. Also, he was re-educated on using his Dilaudid PCA button; he verbalized understanding.     Electronically signed by Toma Barrientos RN on 2/2/2023 at 3:23 AM

## 2023-02-02 NOTE — PROGRESS NOTES
Pt had another episode of increased agitation. Pt attempting to climb out of bed, pt unable to be redirected. Pt getting aggressive with staff, attempting to kick charge RN.  Pt placed in soft bilateral ankle restraints at this time, see orders

## 2023-02-02 NOTE — PROGRESS NOTES
Bedside shift hand-off done w/ oncoming RN Warden Lomeli., to assume pt. care. Dr. Kalyan Hills and NP Destiny Coleman is at the bedside for AM rounds (see new orders). This RN handed-off the pt. in a stable condition.     Electronically signed by Lainey Sun RN on 2/2/2023 at 7:50 AM

## 2023-02-02 NOTE — CARE COORDINATION
Chart Reviewed. Call placed to wife for assessment and ACP completion. She would like to have his complete ACP while in house. Referral made to Spiritual Care Team.    INITIAL CASE MANAGEMENT ASSESSMENT    Reviewed chart, met with patient to assess possible discharge needs. Explained Case Management role/services. Living Situation: Pt and wife live in a one floor condo without any steps. Prior to admit, he was independent with all personal care needs, sometimes used a cane if his knee was bothering him. He does some driving. ADLs: independent     DME: Handicapped bathroom, cane    PT/OT Recs: TBD     Active Services: none     Transportation: wife     Medications: Mail in or [facility] in [place]    PCP: Brian Hernandez   last visit 1-      HD/PD: neither    PLAN/COMMENTS: Goal is to return home at discharge. No needs at this time. Referral made to Spiritual care team for creation of adv directives per wife.     [place], [place]     Case Management   [phone]    2/2/2023  10:29 AM

## 2023-02-02 NOTE — PROGRESS NOTES
NG bridreggie attempt x2 by 2 separate RNs, both attempts unsuccessful.  Called RN manager Ludy Ardon to try to attempt ZAKIYA camara

## 2023-02-02 NOTE — FLOWSHEET NOTE
02/01/23 2157 02/02/23 0609   PCA Assess - Hydromorphone (1 MG/ML)    Number of Doses Given Hydromorphone (1 mg/mL) 15 19   Number of Attempts Hydromorphone (1 mg/ml) 28 43     Throughout the night, this RN had to remind him that when he's in pain, he should push the Dilaudid PCA button. Even w/ repeated education, it was observed that he didn't 100% use his PCA button as what was taught.     Electronically signed by Carlos Treviño RN on 2/2/2023 at 6:14 AM

## 2023-02-02 NOTE — PROGRESS NOTES
Pt having multiple episodes of agitation and restlessness. Pt was able to calm with frequent redirection. Pt has become even more agitated and redirection has started to increase agitation. Pt keep telling this RN \"You are going to kill me, you are going to kill my wife\".  SBPs up in the 200's, this RN spoke to charge to try to attempt to calm pt since pt seems to become more agitated by this RN at the moment

## 2023-02-02 NOTE — FLOWSHEET NOTE
02/02/23 1226   Treatment Team Notification   Reason for Communication Change in status  (Dislodged NG, readvanced)   Team Member Name Louise Quiles NP   Treatment Team Role Advanced Practice Nurse   Method of Communication Call   Response Waiting for response   Notification Time 1226     Response received about NG    Called back to ask about home gabapentin, call received back from Louise Quiles NP that gabapentin has been restarted, see MAR

## 2023-02-02 NOTE — PROGRESS NOTES
Late entry due to patient care. Bedside shift hand-off done w/ off-going RN Gracie HORTON Pt. is restless in bed and always moaning. Per report from AM shift RN, he had been delusional, restless, and always moaning the past few hours, but when asked, he would deny any need. He was asked if he's in pain, and he agreed. He was re-instructed to use the Dilaudid PCA button. He verbalized understanding and pushed the said button. He was repositioned in bed. He's Atrial Fibrillation on the monitor w/ HR between 100-110s, w/ occasional PVCs, on O2 @ 3 L/min, not in any respiratory distress, w/ dry and intact mid-abdominal dressing w/ JESSICA NPWT (w/ flashing green OK button), w/ jennings catheter, w/ R IJ CVC, w/ L radial A-line, and w/ NG tube (@ 63 cm) to continuous low wall suction. He's on Amiodarone drip @ 0.5 mg/min, Dilaudid PCA, and D5 1/2 NS @ 100 ml/hr (see MAR). He has bilateral soft wrist restraints on.     Electronically signed by Vania Arauz RN on 2/2/2023 at 1:56 AM

## 2023-02-02 NOTE — FLOWSHEET NOTE
02/02/23 1418   Treatment Team Notification   Reason for Communication Medication concern   Team Member Name Angelica Wallace NP   Treatment Team Role Advanced Practice Nurse   Method of Communication Call   Response Waiting for response   Notification Time [id number]   Pt having consistent periods of agitation and restlessness. Pt has multiple periods of getting worked up, hyperventilating, BP increasing, yelling, crying. Pt has repeatedly told RN and wife \"I'm sorry, I'm dying\".      1423: Call from Angelica Wallace NP, orders received to start precedex gtt, stop PCA pump and have PRN dilaudid pushes for pain

## 2023-02-02 NOTE — ACP (ADVANCE CARE PLANNING)
Advance Care Planning     Advance Care Planning Activator (Inpatient)  Conversation Note      Date of ACP Conversation: 2/2/2023     Conversation Conducted with:    Padmini Hill Decision Maker: Next of Kin by law (only applies in absence of above) (name) Felix Colon    ACP Activator: Michele Nury [facility]    {Health Care Decision Maker:     Current Designated Health Care Decision Maker:     Primary Decision Maker: Kesha Jeanette - [phone]  Click here to complete Healthcare Decision Makers including section of the Healthcare Decision Maker Relationship (ie \"Primary\")      Care Preferences    Ventilation: \"If you were in your present state of health and suddenly became very ill and were unable to breathe on your own, what would your preference be about the use of a ventilator (breathing machine) if it were available to you? \"      Would the patient desire the use of ventilator (breathing machine)?: yes    \"If your health worsens and it becomes clear that your chance of recovery is unlikely, what would your preference be about the use of a ventilator (breathing machine) if it were available to you? \"     Would the patient desire the use of ventilator (breathing machine)?: No      Resuscitation  \"CPR works best to restart the heart when there is a sudden event, like a heart attack, in someone who is otherwise healthy. Unfortunately, CPR does not typically restart the heart for people who have serious health conditions or who are very sick. \"    \"In the event your heart stopped as a result of an underlying serious health condition, would you want attempts to be made to restart your heart (answer \"yes\" for attempt to resuscitate) or would you prefer a natural death (answer \"no\" for do not attempt to resuscitate)? \" yes       [] Yes   [] No   Educated Patient / Cesar Mullins regarding differences between Advance Directives and portable DNR orders.     Length of ACP Conversation in minutes:  1.5 minutes    Conversation Outcomes:  [x] ACP discussion completed  [] Existing advance directive reviewed with patient; no changes to patient's previously recorded wishes  [] New Advance Directive completed  [] Portable Do Not Rescitate prepared for Provider review and signature  [] POLST/POST/MOLST/MOST prepared for Provider review and signature      Follow-up plan:    [x] Schedule follow-up conversation to continue planning  [] Referred individual to Provider for additional questions/concerns   [] Advised patient/agent/surrogate to review completed ACP document and update if needed with changes in condition, patient preferences or care setting    [x] This note routed to one or more involved healthcare providers    Referral made to Spiritual Care Team for creation of Adv Directives per wife.      Routed to [address] [facility], [place]    [place], [place]     Case Management   [phone]    2/2/2023  10:31 AM

## 2023-02-02 NOTE — PROGRESS NOTES
Late entry due to patient care. He converted from Atrial Fibrillation to SR w/ HR 80s to 90s.     Electronically signed by Serjio Oquendo RN on 2/2/2023 at 3:11 AM

## 2023-02-02 NOTE — PLAN OF CARE
Problem: Discharge Planning  Goal: Discharge to home or other facility with appropriate resources  Outcome: Progressing  Flowsheets (Taken 1/31/2023 2000 by Edison Doll, RN)  Discharge to home or other facility with appropriate resources:   Identify barriers to discharge with patient and caregiver   Arrange for needed discharge resources and transportation as appropriate   Identify discharge learning needs (meds, wound care, etc)   Arrange for interpreters to assist at discharge as needed   Refer to discharge planning if patient needs post-hospital services based on physician order or complex needs related to functional status, cognitive ability or social support system     Problem: Pain  Goal: Verbalizes/displays adequate comfort level or baseline comfort level  2/2/2023 1836 by Mary Lopez RN  Outcome: Progressing  Flowsheets (Taken 1/31/2023 2000 by Edison Doll RN)  Verbalizes/displays adequate comfort level or baseline comfort level:   Encourage patient to monitor pain and request assistance   Assess pain using appropriate pain scale   Administer analgesics based on type and severity of pain and evaluate response   Implement non-pharmacological measures as appropriate and evaluate response   Consider cultural and social influences on pain and pain management   Notify Licensed Independent Practitioner if interventions unsuccessful or patient reports new pain  2/2/2023 0553 by Wicho Meredith RN  Outcome: Progressing     Problem: Safety - Adult  Goal: Free from fall injury  Outcome: Progressing  Flowsheets (Taken 2/2/2023 1836)  Free From Fall Injury: Instruct family/caregiver on patient safety     Problem: Skin/Tissue Integrity  Goal: Absence of new skin breakdown  Description: 1. Monitor for areas of redness and/or skin breakdown  2. Assess vascular access sites hourly  3. Every 4-6 hours minimum:  Change oxygen saturation probe site  4.   Every 4-6 hours:  If on nasal continuous positive airway pressure, respiratory therapy assess nares and determine need for appliance change or resting period. Outcome: Progressing     Problem: Safety - Medical Restraint  Goal: Remains free of injury from restraints (Restraint for Interference with Medical Device)  Description: INTERVENTIONS:  1. Determine that other, less restrictive measures have been tried or would not be effective before applying the restraint  2. Evaluate the patient's condition at the time of restraint application  3. Inform patient/family regarding the reason for restraint  4.  Q2H: Monitor safety, psychosocial status, comfort, nutrition and hydration  2/2/2023 1836 by Dania De La Vega RN  Outcome: Progressing  Flowsheets (Taken 2/1/2023 2055 by Loy Kaplan RN)  Remains free of injury from restraints (restraint for interference with medical device):   Determine that other, less restrictive measures have been tried or would not be effective before applying the restraint   Evaluate the patient's condition at the time of restraint application   Inform patient/family regarding the reason for restraint   Every 2 hours: Monitor safety, psychosocial status, comfort, nutrition and hydration  2/2/2023 0553 by Loy Kaplan RN  Outcome: Progressing  Flowsheets (Taken 2/1/2023 2055)  Remains free of injury from restraints (restraint for interference with medical device):   Determine that other, less restrictive measures have been tried or would not be effective before applying the restraint   Evaluate the patient's condition at the time of restraint application   Inform patient/family regarding the reason for restraint   Every 2 hours: Monitor safety, psychosocial status, comfort, nutrition and hydration     Problem: Neurosensory - Adult  Goal: Achieves stable or improved neurological status  2/2/2023 1836 by Dania De La Vega RN  Outcome: Progressing  Flowsheets (Taken 2/1/2023 2055 by Loy Kaplan RN)  Achieves stable or improved neurological status: Assess for and report changes in neurological status  2/2/2023 0553 by Konrad Nolasco RN  Outcome: Not Progressing  Flowsheets (Taken 2/1/2023 2055)  Achieves stable or improved neurological status: Assess for and report changes in neurological status     Problem: Respiratory - Adult  Goal: Achieves optimal ventilation and oxygenation  2/2/2023 1836 by Torsten Dumont RN  Outcome: Progressing  Flowsheets (Taken 2/1/2023 2055 by Konrad Nolasco RN)  Achieves optimal ventilation and oxygenation:   Assess for changes in respiratory status   Assess for changes in mentation and behavior   Position to facilitate oxygenation and minimize respiratory effort   Oxygen supplementation based on oxygen saturation or arterial blood gases   Encourage broncho-pulmonary hygiene including cough, deep breathe, incentive spirometry   Assess the need for suctioning and aspirate as needed   Assess and instruct to report shortness of breath or any respiratory difficulty  2/2/2023 0553 by Konrad Nolasco RN  Outcome: Progressing  Flowsheets (Taken 2/1/2023 2055)  Achieves optimal ventilation and oxygenation:   Assess for changes in respiratory status   Assess for changes in mentation and behavior   Position to facilitate oxygenation and minimize respiratory effort   Oxygen supplementation based on oxygen saturation or arterial blood gases   Encourage broncho-pulmonary hygiene including cough, deep breathe, incentive spirometry   Assess the need for suctioning and aspirate as needed   Assess and instruct to report shortness of breath or any respiratory difficulty     Problem: Cardiovascular - Adult  Goal: Maintains optimal cardiac output and hemodynamic stability  2/2/2023 1836 by Torsten Dumont RN  Outcome: Progressing  Flowsheets (Taken 2/1/2023 2055 by Konrad Nolasco RN)  Maintains optimal cardiac output and hemodynamic stability:   Monitor blood pressure and heart rate   Monitor urine output and notify Licensed Independent Practitioner for values outside of normal range   Assess for signs of decreased cardiac output   Administer fluid and/or volume expanders as ordered  2/2/2023 0553 by Wicho Meredith RN  Outcome: Progressing  Flowsheets (Taken 2/1/2023 2055)  Maintains optimal cardiac output and hemodynamic stability:   Monitor blood pressure and heart rate   Monitor urine output and notify Licensed Independent Practitioner for values outside of normal range   Assess for signs of decreased cardiac output   Administer fluid and/or volume expanders as ordered  Goal: Absence of cardiac dysrhythmias or at baseline  2/2/2023 1836 by Mary Lopez RN  Outcome: Progressing  [address] (Taken 2/1/2023 2055 by Wicho Meredith, HITESH)  Absence of cardiac dysrhythmias or at baseline:   Monitor cardiac rate and rhythm   Assess for signs of decreased cardiac output   Administer antiarrhythmia medication and electrolyte replacement as ordered  2/2/2023 0553 by Wicho Meredith RN  Outcome: Progressing  Flowsheets (Taken 2/1/2023 2055)  Absence of cardiac dysrhythmias or at baseline:   Monitor cardiac rate and rhythm   Assess for signs of decreased cardiac output   Administer antiarrhythmia medication and electrolyte replacement as ordered     Problem: Skin/Tissue Integrity - Adult  Goal: Skin integrity remains intact  2/2/2023 1836 by Mary Lopez RN  Outcome: Progressing  Flowsheets (Taken 2/1/2023 2055 by Wicho Meredith RN)  Skin Integrity Remains Intact: Monitor for areas of redness and/or skin breakdown  2/2/2023 0553 by Wicho Meredith RN  Outcome: Progressing  Flowsheets (Taken 2/1/2023 2055)  Skin Integrity Remains Intact: Monitor for areas of redness and/or skin breakdown  Goal: Incisions, wounds, or drain sites healing without S/S of infection  2/2/2023 1836 by Mary Lopez RN  Outcome: Progressing  Flowsheets (Taken 2/1/2023 2055 by Wicho Meredith RN)  Incisions, Wounds, or Drain Sites Healing Without Sign and Symptoms of Infection:   TWICE DAILY: Assess and document skin integrity   TWICE DAILY: Assess and document dressing/incision, wound bed, drain sites and surrounding tissue   Initiate pressure ulcer prevention bundle as indicated  2/2/2023 0553 by Zenaida Michele RN  Outcome: Progressing  Flowsheets (Taken 2/1/2023 2055)  Incisions, Wounds, or Drain Sites Healing Without Sign and Symptoms of Infection:   TWICE DAILY: Assess and document skin integrity   TWICE DAILY: Assess and document dressing/incision, wound bed, drain sites and surrounding tissue   Initiate pressure ulcer prevention bundle as indicated     Problem: Genitourinary - Adult  Goal: Urinary catheter remains patent  2/2/2023 1836 by Jorge Foreman RN  Outcome: Progressing  Flowsheets (Taken 2/1/2023 2055 by Zenaida Michele RN)  Urinary catheter remains patent: Assess patency of urinary catheter  2/2/2023 0553 by Zenaida Michele RN  Outcome: Progressing  Flowsheets (Taken 2/1/2023 2055)  Urinary catheter remains patent: Assess patency of urinary catheter     Problem: Infection - Adult  Goal: Absence of infection at discharge  2/2/2023 1836 by Jorge Foreman RN  Outcome: Progressing  Flowsheets (Taken 2/1/2023 2055 by Zenaida Michele RN)  Absence of infection at discharge:   Assess and monitor for signs and symptoms of infection   Monitor lab/diagnostic results   Monitor all insertion sites i.e., indwelling lines, tubes and drains   Administer medications as ordered   Instruct and encourage patient and family to use good hand hygiene technique  2/2/2023 0553 by Zenaida Michele RN  Outcome: Progressing  Flowsheets (Taken 2/1/2023 2055)  Absence of infection at discharge:   Assess and monitor for signs and symptoms of infection   Monitor lab/diagnostic results   Monitor all insertion sites i.e., indwelling lines, tubes and drains   Administer medications as ordered   Instruct and encourage patient and family to use good hand hygiene technique  Goal: Absence of infection during hospitalization  2/2/2023 1836 by Nicole Reinoso RN  Outcome: Progressing  Flowsheets (Taken 2/1/2023 2055 by Jordan Dailey RN)  Absence of infection during hospitalization:   Assess and monitor for signs and symptoms of infection   Monitor lab/diagnostic results   Monitor all insertion sites i.e., indwelling lines, tubes and drains   Administer medications as ordered   Instruct and encourage patient and family to use good hand hygiene technique  2/2/2023 0553 by Jordan Dailey RN  Outcome: Progressing  Flowsheets (Taken 2/1/2023 2055)  Absence of infection during hospitalization:   Assess and monitor for signs and symptoms of infection   Monitor lab/diagnostic results   Monitor all insertion sites i.e., indwelling lines, tubes and drains   Administer medications as ordered   Instruct and encourage patient and family to use good hand hygiene technique     Problem: Metabolic/Fluid and Electrolytes - Adult  Goal: Electrolytes maintained within normal limits  2/2/2023 1836 by Nicole Reinoso RN  Outcome: Progressing  Flowsheets (Taken 2/1/2023 2055 by Jordan Dailey RN)  Electrolytes maintained within normal limits:   Monitor labs and assess patient for signs and symptoms of electrolyte imbalances   Administer electrolyte replacement as ordered   Monitor response to electrolyte replacements, including repeat lab results as appropriate  2/2/2023 0553 by Jordan Dailey RN  Outcome: Progressing  Flowsheets (Taken 2/1/2023 2055)  Electrolytes maintained within normal limits:   Monitor labs and assess patient for signs and symptoms of electrolyte imbalances   Administer electrolyte replacement as ordered   Monitor response to electrolyte replacements, including repeat lab results as appropriate

## 2023-02-02 NOTE — FLOWSHEET NOTE
02/02/23 0910   Treatment Team Notification   Reason for Communication Medication concern  (Change K to different route, ask about restarting metoprolol, Amio gtt)   Team Member Name [facility] NP   Treatment Team Role Advanced Practice Nurse   Method of Communication Call   Response See orders   Notification Time 2905

## 2023-02-02 NOTE — FLOWSHEET NOTE
02/02/23 1657   Art Line   ABP (Arterial line BP) 155/74   ABP Mean (Arterial Line Mean) 103 mmHg   BP coming back down after being calmed by charge RN post agitation episode

## 2023-02-02 NOTE — PLAN OF CARE
Problem: Neurosensory - Adult  Goal: Achieves stable or improved neurological status  Outcome: Not Progressing  Achieves stable or improved neurological status: Assess for and report changes in neurological status     Problem: Pain  Goal: Verbalizes/displays adequate comfort level or baseline comfort level  Outcome: Progressing     Problem: Safety - Medical Restraint  Goal: Remains free of injury from restraints (Restraint for Interference with Medical Device)  Description: INTERVENTIONS:  1. Determine that other, less restrictive measures have been tried or would not be effective before applying the restraint  2. Evaluate the patient's condition at the time of restraint application  3. Inform patient/family regarding the reason for restraint  4.  Q2H: Monitor safety, psychosocial status, comfort, nutrition and hydration  Outcome: Progressing     Problem: Neurosensory - Adult  Goal: Achieves stable or improved neurological status  Outcome: Not Progressing  Achieves stable or improved neurological status: Assess for and report changes in neurological status     Problem: Respiratory - Adult  Goal: Achieves optimal ventilation and oxygenation  Outcome: Progressing  Achieves optimal ventilation and oxygenation:   Assess for changes in respiratory status   Assess for changes in mentation and behavior   Position to facilitate oxygenation and minimize respiratory effort   Oxygen supplementation based on oxygen saturation or arterial blood gases   Encourage broncho-pulmonary hygiene including cough, deep breathe, incentive spirometry   Assess the need for suctioning and aspirate as needed   Assess and instruct to report shortness of breath or any respiratory difficulty     Problem: Cardiovascular - Adult  Goal: Maintains optimal cardiac output and hemodynamic stability  Outcome: Progressing  Maintains optimal cardiac output and hemodynamic stability:   Monitor blood pressure and heart rate   Monitor urine output and notify Licensed Independent Practitioner for values outside of normal range   Assess for signs of decreased cardiac output   Administer fluid and/or volume expanders as ordered     Problem: Cardiovascular - Adult  Goal: Absence of cardiac dysrhythmias or at baseline  Outcome: Progressing  Absence of cardiac dysrhythmias or at baseline:   Monitor cardiac rate and rhythm   Assess for signs of decreased cardiac output   Administer antiarrhythmia medication and electrolyte replacement as ordered     Problem: Genitourinary - Adult  Goal: Urinary catheter remains patent  Outcome: Progressing  Urinary catheter remains patent: Assess patency of urinary catheter     Problem: Infection - Adult  Goal: Absence of infection at discharge  Outcome: Progressing  Absence of infection at discharge:   Assess and monitor for signs and symptoms of infection   Monitor lab/diagnostic results   Monitor all insertion sites i.e., indwelling lines, tubes and drains   Administer medications as ordered   Instruct and encourage patient and family to use good hand hygiene technique     Problem: Infection - Adult  Goal: Absence of infection during hospitalization  Outcome: Progressing  Absence of infection during hospitalization:   Assess and monitor for signs and symptoms of infection   Monitor lab/diagnostic results   Monitor all insertion sites i.e., indwelling lines, tubes and drains   Administer medications as ordered   Instruct and encourage patient and family to use good hand hygiene technique     Problem: Metabolic/Fluid and Electrolytes - Adult  Goal: Electrolytes maintained within normal limits  Outcome: Progressing  Electrolytes maintained within normal limits:   Monitor labs and assess patient for signs and symptoms of electrolyte imbalances   Administer electrolyte replacement as ordered   Monitor response to electrolyte replacements, including repeat lab results as appropriate     Problem: Skin/Tissue Integrity - Adult  Goal: Skin integrity remains intact  Outcome: Progressing  Skin Integrity Remains Intact: Monitor for areas of redness and/or skin breakdown     Problem: Skin/Tissue Integrity - Adult  Goal: Incisions, wounds, or drain sites healing without S/S of infection  Outcome: Progressing  Incisions, Wounds, or Drain Sites Healing Without Sign and Symptoms of Infection:   TWICE DAILY: Assess and document skin integrity   TWICE DAILY: Assess and document dressing/incision, wound bed, drain sites and surrounding tissue   Initiate pressure ulcer prevention bundle as indicated   Electronically signed by Estrada Smart RN on 2/2/2023 at 5:56 AM

## 2023-02-02 NOTE — PROGRESS NOTES
[facility]  Progress Note    2/2/2023 7:39 AM    Chief Complaint: AAA     POD #2 Open AAA Repair with Dr. Darryle Foots    Subjective: Pt seen resting in bed in CVU, soft wrist restraints in place d/t confusion and agitation and attempting to remove NG tube yesterday/overnight    Vital Signs:  Vitals:    02/02/23 0500 02/02/23 0534 02/02/23 0600 02/02/23 0700   BP: 136/62  (!) 129/56 (!) 128/58   Pulse: 98  86 94   Resp: 25 23 26   Temp:   100.4 °F (38 °C)    TempSrc:   Axillary    SpO2: 91%  91% 92%   Weight:  207 lb 14.3 oz (94.3 kg)     Height:  5' 10\" (1.778 m)         I/O:    Intake/Output Summary (Last 24 hours) at 2/2/2023 0739  Last data filed at 2/2/2023 0700  Gross per 24 hour   Intake 3592.43 ml   Output 4065 ml   Net -472.57 ml         Physical Exam:   General: alert, disoriented to place and situation, T-max 101.7 over the past 24 hours  Chest/Lungs: non labored breathing no tachypnea, retractions or cyanosis  Cardiac: regular rate and rhythm - converted to NSR overnight  Abdomen: soft, mildly distended, JESSICA dressing to midline incision with mild strike-through drainage, abdominal binder in place  Extremities: normal strength, tone, and muscle mass, no deformities, no significant edema to BLE  Vascular: extremities warm and well perfused, no signs of cyanosis or ischemia, able to move BUE and BLE to command  Pulses:  -R DP: +2/4 palpable  -L DP: +2/4 palpable    Labs:   Lab Results   Component Value Date/Time     02/02/2023 04:22 AM    K 3.7 02/02/2023 04:22 AM     02/02/2023 04:22 AM    CO2 23 02/02/2023 04:22 AM    BUN 26 02/02/2023 04:22 AM    CREATININE 1.5 02/02/2023 04:22 AM    GFRAA >60 09/13/2022 11:50 AM    GFRAA >60 04/26/2013 09:53 AM    LABGLOM 47 02/02/2023 04:22 AM    GLUCOSE 129 02/02/2023 04:22 AM    GLUCOSE 113 08/01/2011 08:50 AM    MG 1.50 02/02/2023 04:22 AM    CALCIUM 8.0 02/02/2023 04:22 AM     Lab Results   Component Value Date/Time    WBC 13.9 02/02/2023 04:22 AM    RBC 2.55 02/02/2023 04:22 AM    HGB 7.4 02/02/2023 04:22 AM    HCT 23.4 02/02/2023 04:22 AM    MCV 91.6 02/02/2023 04:22 AM    RDW 15.6 02/02/2023 04:22 AM     02/02/2023 04:22 AM     Lab Results   Component Value Date    INR 1.47 (H) 02/01/2023    PROTIME 17.8 (H) 02/01/2023        Scheduled Meds:    haloperidol lactate  1 mg IntraMUSCular Once    magnesium sulfate  2,000 mg IntraVENous Once    LORazepam  0.5 mg IntraVENous BID    enoxaparin Sodium  30 mg SubCUTAneous Daily    famotidine (PEPCID) injection  10 mg IntraVENous BID    [START ON 2/4/2023] Levothyroxine Sodium  50 mcg IntraVENous Daily    sodium chloride flush  5-40 mL IntraVENous 2 times per day    nicotine  1 patch TransDERmal Daily     Continuous Infusions:    amiodarone 0.5 mg/min (02/02/23 0617)    dextrose 5 % and 0.45 % NaCl 100 mL/hr at 02/02/23 0617    HYDROmorphone      sodium chloride      phenylephrine (JAIME-SYNEPHRINE) 50 mg/250 mL infusion 10 mcg/min (02/01/23 0600)    sodium chloride         Assessment:   -POD #2 Open AAA Repair with Dr. Emily Diamond remains in place to mid-line incision with small amount of drainage noted  -UOP - 2700 overnight  -NG with 1300 out overnight and 560 PO intake - net total = 740 out  -Converted from A-fib to NSR overnight  -Amio infusion - per Cardiology  -Confused/agitated this AM - pt did not sleep overnight per nurse  -Creatinine 1.5 (1.9 yesterday)  -Magnesium 1.5  -Hgb 7.4 (8.1 yesterday) - transfuse for Hgb < 7    Plan:  -Continue NG Tube  -IM Haldol for Agitation  -Abdominal binder  -Encourage IS  -Restart Lexapro - crushed via NG  -Reduce fluids to 50 mL/hr  -Replace Magnesium  -Call family to help with re-orientation and improved agitation    All pertinent information and plan of care discussed with Dr. Mandi Colon. All questions and concerns were addressed with the patient. I have discussed the above stated plan with the patient and the nurse.  The patient verbalized understanding and agreed with the plan.     Thank you for allowing to us to participate in the care of Ronit Wilson      Electronically signed by DEDRA Lange CNP on 2/2/2023 at 7:39 AM

## 2023-02-02 NOTE — PROGRESS NOTES
He is having increasing agitation, only orineted to self, trying to pull himself strongly out of the bed and his soft wrist restraints, shouting \"Help me! They're going to kill me!\", ABP going to 160s to 170s and HR going as high as 120s while agitated, and Temperature 101.7F. This RN called [phone] and had on-call Vascular Surgeon be paged. Per answering service, Dr. Hannah Carrizales will be calling this RN back.     Electronically signed by Araceli Aly RN on 2023 at 5:02 AM

## 2023-02-02 NOTE — PROGRESS NOTES
Narcotic Waste Documentation    Administered 20 mg of Hydromorphone and wasted 10 mg per [facility].      Electronically signed by Lamberto Pham RN on 2/2/2023 at 3:02 PM     Electronically signed by Deangelo Kan RN on 2/2/2023 at 6:36 PM

## 2023-02-03 ENCOUNTER — APPOINTMENT (OUTPATIENT)
Dept: GENERAL RADIOLOGY | Age: 78
DRG: 270 | End: 2023-02-03
Attending: STUDENT IN AN ORGANIZED HEALTH CARE EDUCATION/TRAINING PROGRAM
Payer: MEDICARE

## 2023-02-03 PROBLEM — R45.1 AGITATION: Status: ACTIVE | Noted: 2023-02-03

## 2023-02-03 PROBLEM — F41.1 GAD (GENERALIZED ANXIETY DISORDER): Status: ACTIVE | Noted: 2023-02-03

## 2023-02-03 LAB
ANION GAP SERPL CALCULATED.3IONS-SCNC: 14 MMOL/L (ref 3–16)
BASE EXCESS ARTERIAL: 0 MMOL/L (ref -3–3)
BUN BLDV-MCNC: 28 MG/DL (ref 7–20)
CALCIUM SERPL-MCNC: 8.2 MG/DL (ref 8.3–10.6)
CARBOXYHEMOGLOBIN ARTERIAL: 1.2 % (ref 0–1.5)
CHLORIDE BLD-SCNC: 111 MMOL/L (ref 99–110)
CO2: 22 MMOL/L (ref 21–32)
CREAT SERPL-MCNC: 1.3 MG/DL (ref 0.8–1.3)
GFR SERPL CREATININE-BSD FRML MDRD: 56 ML/MIN/{1.73_M2}
GLUCOSE BLD-MCNC: 125 MG/DL (ref 70–99)
HCO3 ARTERIAL: 23.6 MMOL/L (ref 21–29)
HCT VFR BLD CALC: 23.2 % (ref 40.5–52.5)
HEMOGLOBIN, ART, EXTENDED: 7.3 G/DL (ref 13.5–17.5)
HEMOGLOBIN: 7.5 G/DL (ref 13.5–17.5)
MAGNESIUM: 2.2 MG/DL (ref 1.8–2.4)
MCH RBC QN AUTO: 29.4 PG (ref 26–34)
MCHC RBC AUTO-ENTMCNC: 32.3 G/DL (ref 31–36)
MCV RBC AUTO: 91.1 FL (ref 80–100)
METHEMOGLOBIN ARTERIAL: 1 %
O2 SAT, ARTERIAL: 95.8 %
O2 THERAPY: ABNORMAL
PCO2 ARTERIAL: 32.8 MMHG (ref 35–45)
PDW BLD-RTO: 16.1 % (ref 12.4–15.4)
PH ARTERIAL: 7.47 (ref 7.35–7.45)
PLATELET # BLD: 86 K/UL (ref 135–450)
PMV BLD AUTO: 8.7 FL (ref 5–10.5)
PO2 ARTERIAL: 72.9 MMHG (ref 75–108)
POTASSIUM SERPL-SCNC: 4.1 MMOL/L (ref 3.5–5.1)
RBC # BLD: 2.55 M/UL (ref 4.2–5.9)
SODIUM BLD-SCNC: 147 MMOL/L (ref 136–145)
TCO2 ARTERIAL: 24.6 MMOL/L
WBC # BLD: 11.7 K/UL (ref 4–11)

## 2023-02-03 PROCEDURE — 2580000003 HC RX 258: Performed by: STUDENT IN AN ORGANIZED HEALTH CARE EDUCATION/TRAINING PROGRAM

## 2023-02-03 PROCEDURE — 2500000003 HC RX 250 WO HCPCS: Performed by: NURSE PRACTITIONER

## 2023-02-03 PROCEDURE — 2700000000 HC OXYGEN THERAPY PER DAY

## 2023-02-03 PROCEDURE — 6370000000 HC RX 637 (ALT 250 FOR IP): Performed by: NURSE PRACTITIONER

## 2023-02-03 PROCEDURE — 6360000002 HC RX W HCPCS: Performed by: STUDENT IN AN ORGANIZED HEALTH CARE EDUCATION/TRAINING PROGRAM

## 2023-02-03 PROCEDURE — 99232 SBSQ HOSP IP/OBS MODERATE 35: CPT | Performed by: NURSE PRACTITIONER

## 2023-02-03 PROCEDURE — APPNB30 APP NON BILLABLE TIME 0-30 MINS: Performed by: NURSE PRACTITIONER

## 2023-02-03 PROCEDURE — 94761 N-INVAS EAR/PLS OXIMETRY MLT: CPT

## 2023-02-03 PROCEDURE — 6370000000 HC RX 637 (ALT 250 FOR IP): Performed by: REGISTERED NURSE

## 2023-02-03 PROCEDURE — 82803 BLOOD GASES ANY COMBINATION: CPT

## 2023-02-03 PROCEDURE — 2100000000 HC CCU R&B

## 2023-02-03 PROCEDURE — 83735 ASSAY OF MAGNESIUM: CPT

## 2023-02-03 PROCEDURE — 85027 COMPLETE CBC AUTOMATED: CPT

## 2023-02-03 PROCEDURE — 6370000000 HC RX 637 (ALT 250 FOR IP): Performed by: STUDENT IN AN ORGANIZED HEALTH CARE EDUCATION/TRAINING PROGRAM

## 2023-02-03 PROCEDURE — 6360000002 HC RX W HCPCS: Performed by: NURSE PRACTITIONER

## 2023-02-03 PROCEDURE — 99223 1ST HOSP IP/OBS HIGH 75: CPT | Performed by: INTERNAL MEDICINE

## 2023-02-03 PROCEDURE — 74018 RADEX ABDOMEN 1 VIEW: CPT

## 2023-02-03 PROCEDURE — 2500000003 HC RX 250 WO HCPCS: Performed by: STUDENT IN AN ORGANIZED HEALTH CARE EDUCATION/TRAINING PROGRAM

## 2023-02-03 PROCEDURE — 80048 BASIC METABOLIC PNL TOTAL CA: CPT

## 2023-02-03 RX ORDER — SODIUM CHLORIDE 9 MG/ML
INJECTION, SOLUTION INTRAVENOUS CONTINUOUS
Status: DISCONTINUED | OUTPATIENT
Start: 2023-02-03 | End: 2023-02-05

## 2023-02-03 RX ORDER — QUETIAPINE FUMARATE 25 MG/1
25 TABLET, FILM COATED ORAL 3 TIMES DAILY PRN
Status: DISCONTINUED | OUTPATIENT
Start: 2023-02-03 | End: 2023-02-06

## 2023-02-03 RX ORDER — BISACODYL 10 MG
10 SUPPOSITORY, RECTAL RECTAL DAILY
Status: DISCONTINUED | OUTPATIENT
Start: 2023-02-03 | End: 2023-02-06

## 2023-02-03 RX ORDER — MIRTAZAPINE 15 MG/1
30 TABLET, FILM COATED ORAL NIGHTLY
Status: DISCONTINUED | OUTPATIENT
Start: 2023-02-03 | End: 2023-02-05

## 2023-02-03 RX ORDER — AMIODARONE HYDROCHLORIDE 200 MG/1
TABLET ORAL
Qty: 37 TABLET | Refills: 1 | Status: SHIPPED | OUTPATIENT
Start: 2023-02-03

## 2023-02-03 RX ORDER — ESCITALOPRAM OXALATE 10 MG/1
20 TABLET ORAL DAILY
Status: DISCONTINUED | OUTPATIENT
Start: 2023-02-04 | End: 2023-02-07 | Stop reason: HOSPADM

## 2023-02-03 RX ORDER — SODIUM CHLORIDE 9 MG/ML
INJECTION, SOLUTION INTRAVENOUS CONTINUOUS PRN
Status: DISCONTINUED | OUTPATIENT
Start: 2023-02-03 | End: 2023-02-07 | Stop reason: HOSPADM

## 2023-02-03 RX ORDER — LORAZEPAM 0.5 MG/1
0.5 TABLET ORAL 2 TIMES DAILY
Status: DISCONTINUED | OUTPATIENT
Start: 2023-02-03 | End: 2023-02-05 | Stop reason: DRUGHIGH

## 2023-02-03 RX ORDER — IPRATROPIUM BROMIDE AND ALBUTEROL SULFATE 2.5; .5 MG/3ML; MG/3ML
1 SOLUTION RESPIRATORY (INHALATION) EVERY 4 HOURS PRN
Status: DISCONTINUED | OUTPATIENT
Start: 2023-02-03 | End: 2023-02-07 | Stop reason: HOSPADM

## 2023-02-03 RX ORDER — MIRTAZAPINE 15 MG/1
45 TABLET, FILM COATED ORAL NIGHTLY
Status: DISCONTINUED | OUTPATIENT
Start: 2023-02-03 | End: 2023-02-03

## 2023-02-03 RX ADMIN — AMIODARONE HYDROCHLORIDE 400 MG: 200 TABLET ORAL at 09:39

## 2023-02-03 RX ADMIN — GABAPENTIN 300 MG: 300 CAPSULE ORAL at 20:41

## 2023-02-03 RX ADMIN — HYDROMORPHONE HYDROCHLORIDE 0.5 MG: 1 INJECTION, SOLUTION INTRAMUSCULAR; INTRAVENOUS; SUBCUTANEOUS at 20:40

## 2023-02-03 RX ADMIN — LORAZEPAM 0.5 MG: 0.5 TABLET ORAL at 12:30

## 2023-02-03 RX ADMIN — DEXMEDETOMIDINE HYDROCHLORIDE 1.5 MCG/KG/HR: 400 INJECTION INTRAVENOUS at 05:46

## 2023-02-03 RX ADMIN — AMIODARONE HYDROCHLORIDE 400 MG: 200 TABLET ORAL at 20:37

## 2023-02-03 RX ADMIN — DEXMEDETOMIDINE HYDROCHLORIDE 1.5 MCG/KG/HR: 400 INJECTION INTRAVENOUS at 03:55

## 2023-02-03 RX ADMIN — Medication 10 MG: at 20:37

## 2023-02-03 RX ADMIN — ESCITALOPRAM OXALATE 15 MG: 10 TABLET, FILM COATED ORAL at 09:39

## 2023-02-03 RX ADMIN — Medication 10 MG: at 11:24

## 2023-02-03 RX ADMIN — LORAZEPAM 0.5 MG: 0.5 TABLET ORAL at 20:37

## 2023-02-03 RX ADMIN — HYDROMORPHONE HYDROCHLORIDE 1 MG: 1 INJECTION, SOLUTION INTRAMUSCULAR; INTRAVENOUS; SUBCUTANEOUS at 06:35

## 2023-02-03 RX ADMIN — GABAPENTIN 300 MG: 300 CAPSULE ORAL at 09:39

## 2023-02-03 RX ADMIN — HYDROMORPHONE HYDROCHLORIDE 0.5 MG: 1 INJECTION, SOLUTION INTRAMUSCULAR; INTRAVENOUS; SUBCUTANEOUS at 23:40

## 2023-02-03 RX ADMIN — SODIUM CHLORIDE, PRESERVATIVE FREE 10 ML: 5 INJECTION INTRAVENOUS at 20:42

## 2023-02-03 RX ADMIN — SODIUM CHLORIDE, PRESERVATIVE FREE 10 ML: 5 INJECTION INTRAVENOUS at 12:20

## 2023-02-03 RX ADMIN — GABAPENTIN 300 MG: 300 CAPSULE ORAL at 14:50

## 2023-02-03 RX ADMIN — DEXMEDETOMIDINE HYDROCHLORIDE 1.5 MCG/KG/HR: 400 INJECTION INTRAVENOUS at 01:02

## 2023-02-03 RX ADMIN — DEXMEDETOMIDINE HYDROCHLORIDE 1.1 MCG/KG/HR: 400 INJECTION INTRAVENOUS at 12:30

## 2023-02-03 RX ADMIN — DEXMEDETOMIDINE HYDROCHLORIDE 0.4 MCG/KG/HR: 400 INJECTION INTRAVENOUS at 23:32

## 2023-02-03 RX ADMIN — DEXMEDETOMIDINE HYDROCHLORIDE 1.5 MCG/KG/HR: 400 INJECTION INTRAVENOUS at 08:50

## 2023-02-03 RX ADMIN — MIRTAZAPINE 30 MG: 15 TABLET, FILM COATED ORAL at 20:36

## 2023-02-03 RX ADMIN — ENOXAPARIN SODIUM 30 MG: 100 INJECTION SUBCUTANEOUS at 11:22

## 2023-02-03 RX ADMIN — SODIUM CHLORIDE: 9 INJECTION, SOLUTION INTRAVENOUS at 20:39

## 2023-02-03 ASSESSMENT — PAIN DESCRIPTION - ORIENTATION: ORIENTATION: LOWER

## 2023-02-03 ASSESSMENT — PAIN DESCRIPTION - LOCATION: LOCATION: BACK

## 2023-02-03 ASSESSMENT — PAIN SCALES - GENERAL: PAINLEVEL_OUTOF10: 0

## 2023-02-03 ASSESSMENT — PAIN - FUNCTIONAL ASSESSMENT: PAIN_FUNCTIONAL_ASSESSMENT: PREVENTS OR INTERFERES SOME ACTIVE ACTIVITIES AND ADLS

## 2023-02-03 ASSESSMENT — PAIN DESCRIPTION - DESCRIPTORS: DESCRIPTORS: ACHING

## 2023-02-03 NOTE — PROGRESS NOTES
Physician Progress Note      Ashley Jameson  CSN #:                  [id number]  :                       1945  ADMIT DATE:       2023 5:52 AM  DISCH DATE:  Sandra Gonsalves  PROVIDER #:        Kenny Mena DO          QUERY TEXT:    Pt admitted with Open AAA Repair . Pt noted to have  confusion and agitation. If possible, please document in the progress notes and discharge summary if   you are evaluating and / or treating any of the following: The medical record reflects the following:  Risk Factors: s/p open AAA repair , a fib  Clinical Indicators:Post op alert and oriented x4. Documentation per nursing   on  of  Per report from AM shift RN, he had been delusional, restless, and   always moaning the past few hours. On   He is having increasing agitation,   only oriented to self, trying to pull himself strongly out of the bed and his   soft wrist restraints, shouting \"Help me! They're going to kill me!\",  temp   101. 7.   noted patient's increasing confusion, agitation. Per pn  of-Pt   seen resting in bed in CVU, soft wrist restraints in place d/t confusion and   agitation and attempting to remove NG tub  Treatment: haldol im, restraints, pca changed to dilaudid , monitor mental   status, reorient to environment ,Precedex    Thank you, Leann Lux RN CDS CRCR [email or web address]  Options provided:  -- Drug-induced encephalopathy due to anesthesia ,  fentanyl , dilaudid  -- Drug-induced encephalopathy due to, Please specify substance. -- Drug-induced encephalopathy, substance(s) unknown  -- Metabolic encephalopathy  -- Other - I will add my own diagnosis  -- Disagree - Not applicable / Not valid  -- Disagree - Clinically unable to determine / Unknown  -- Refer to Clinical Documentation Reviewer    PROVIDER RESPONSE TEXT:    Provider disagreed with this query. Query created by:  Jatinder Lux on 2023 10:32 AM      Electronically signed by:  Kenny Mena DO 2/3/2023 10:10 AM

## 2023-02-03 NOTE — PROGRESS NOTES
4 Eyes Skin Assessment     NAME:  Trevin Chisholm  YOB: 1945  MEDICAL RECORD NUMBER:  [id number]    The patient is being assessed for  Shift Handoff    I agree that One RN have performed a thorough Head to Toe Skin Assessment on the patient. ALL assessment sites listed below have been assessed. Areas assessed by both nurses:    Head, Face, Ears, Shoulders, Back, Chest, Arms, Elbows, Hands, Sacrum. Buttock, Coccyx, Ischium, and Legs. Feet and Heels        Does the Patient have a Wound?  No noted wound(s)       Kristian Prevention initiated by RN: N/A   Wound Care Orders initiated by RN: N/A    Pressure Injury (Stage 3,4, Unstageable, DTI, NWPT, and Complex wounds) if present place referral order by RN under : NA    New and Established Ostomies, if present place, referral order under : NA      Nurse 1 eSignature: Electronically signed by Moris Brewer RN on 2/3/23 at 6:07 AM EST    **SHARE this note so that the co-signing nurse is able to place an eSignature**    Nurse 2 eSignature: Electronically signed by Renny Florez RN on 2/3/23 at 3:11 PM EST

## 2023-02-03 NOTE — PROGRESS NOTES
4 Eyes Skin Assessment     NAME:  Natali Treviño  YOB: 1945  MEDICAL RECORD NUMBER:  [id number]    The patient is being assessed for  Shift Handoff    I agree that One RN have performed a thorough Head to Toe Skin Assessment on the patient. ALL assessment sites listed below have been assessed. Areas assessed by both nurses:    Head, Face, Ears, Shoulders, Back, Chest, Arms, Elbows, Hands, Sacrum. Buttock, Coccyx, Ischium, and Legs. Feet and Heels        Does the Patient have a Wound?  No noted wound(s)       Kristian Prevention initiated by RN: Yes   Wound Care Orders initiated by RN: NA    Pressure Injury (Stage 3,4, Unstageable, DTI, NWPT, and Complex wounds) if present place referral order by RN under : NA    New and Established Ostomies, if present place, referral order under : NA      Nurse 1 eSignature: Electronically signed by Carlo Edward RN on 2/2/23 at 7:06 PM EST    **SHARE this note so that the co-signing nurse is able to place an eSignature**    Nurse 2 eSignature: Electronically signed by Shaina Guzman RN on 2/2/23 at 8:59 PM EST

## 2023-02-03 NOTE — CARE COORDINATION
Chart Reviewed. Pt confused; psych ordered. From Home. Will need pt/ot evals to assist with dc plan.   [facility], [place]     Case Management   [phone]    2/3/2023  9:31 AM

## 2023-02-03 NOTE — PROGRESS NOTES
Patient screaming he would like to leave AMA. Patient cannot answer any orientation questions other than his own name, but gets his birth date incorrect still. Patient states he is being \"held captive at the [employer]", and that it is 1987. All attempts to reorient patient still failing. Will continue to monitor patient closely.     Luda Andrews RN

## 2023-02-03 NOTE — PLAN OF CARE
Problem: Discharge Planning  Goal: Discharge to home or other facility with appropriate resources  2/2/2023 2050 by Edison Doll RN  Outcome: Progressing  Flowsheets (Taken 2/2/2023 2000)  Discharge to home or other facility with appropriate resources:   Identify barriers to discharge with patient and caregiver   Arrange for needed discharge resources and transportation as appropriate   Identify discharge learning needs (meds, wound care, etc)   Arrange for interpreters to assist at discharge as needed   Refer to discharge planning if patient needs post-hospital services based on physician order or complex needs related to functional status, cognitive ability or social support system  2/2/2023 1836 by Mary Lopez RN  Outcome: Progressing  [address] (Taken 1/31/2023 2000 by Edison Doll RN)  Discharge to home or other facility with appropriate resources:   Identify barriers to discharge with patient and caregiver   Arrange for needed discharge resources and transportation as appropriate   Identify discharge learning needs (meds, wound care, etc)   Arrange for interpreters to assist at discharge as needed   Refer to discharge planning if patient needs post-hospital services based on physician order or complex needs related to functional status, cognitive ability or social support system     Problem: Pain  Goal: Verbalizes/displays adequate comfort level or baseline comfort level  2/2/2023 2050 by Edison Doll RN  Outcome: Progressing  Flowsheets (Taken 2/2/2023 2000)  Verbalizes/displays adequate comfort level or baseline comfort level:   Encourage patient to monitor pain and request assistance   Assess pain using appropriate pain scale   Administer analgesics based on type and severity of pain and evaluate response   Implement non-pharmacological measures as appropriate and evaluate response   Consider cultural and social influences on pain and pain management   Notify Licensed Independent Practitioner if interventions unsuccessful or patient reports new pain  2/2/2023 1836 by Remy Keita RN  Outcome: Progressing  Flowsheets (Taken 1/31/2023 2000 by Marie Brannon RN)  Verbalizes/displays adequate comfort level or baseline comfort level:   Encourage patient to monitor pain and request assistance   Assess pain using appropriate pain scale   Administer analgesics based on type and severity of pain and evaluate response   Implement non-pharmacological measures as appropriate and evaluate response   Consider cultural and social influences on pain and pain management   Notify Licensed Independent Practitioner if interventions unsuccessful or patient reports new pain     Problem: Safety - Adult  Goal: Free from fall injury  2/2/2023 2050 by Marie Brannon RN  Outcome: Progressing  2/2/2023 1836 by Remy Keita RN  Outcome: Progressing  Flowsheets (Taken 2/2/2023 1836)  Free From Fall Injury: Instruct family/caregiver on patient safety     Problem: Skin/Tissue Integrity  Goal: Absence of new skin breakdown  Description: 1. Monitor for areas of redness and/or skin breakdown  2. Assess vascular access sites hourly  3. Every 4-6 hours minimum:  Change oxygen saturation probe site  4. Every 4-6 hours:  If on nasal continuous positive airway pressure, respiratory therapy assess nares and determine need for appliance change or resting period. 2/2/2023 2050 by Marie Brannon RN  Outcome: Progressing  2/2/2023 1836 by Remy Keita RN  Outcome: Progressing     Problem: Safety - Medical Restraint  Goal: Remains free of injury from restraints (Restraint for Interference with Medical Device)  Description: INTERVENTIONS:  1. Determine that other, less restrictive measures have been tried or would not be effective before applying the restraint  2. Evaluate the patient's condition at the time of restraint application  3. Inform patient/family regarding the reason for restraint  4.  Q2H: Monitor safety, psychosocial status, comfort, nutrition and hydration  2/2/2023 2050 by Leellen Cheadle, RN  Outcome: Progressing  Flowsheets  Taken 2/2/2023 2001  Remains free of injury from restraints (restraint for interference with medical device):   Determine that other, less restrictive measures have been tried or would not be effective before applying the restraint   Evaluate the patient's condition at the time of restraint application   Inform patient/family regarding the reason for restraint   Every 2 hours: Monitor safety, psychosocial status, comfort, nutrition and hydration  Taken 2/2/2023 2000  Remains free of injury from restraints (restraint for interference with medical device):   Determine that other, less restrictive measures have been tried or would not be effective before applying the restraint   Evaluate the patient's condition at the time of restraint application   Inform patient/family regarding the reason for restraint   Every 2 hours: Monitor safety, psychosocial status, comfort, nutrition and hydration  2/2/2023 1836 by Ej Hansen RN  Outcome: Progressing  Flowsheets (Taken 2/1/2023 2055 by Serjio Oquendo RN)  Remains free of injury from restraints (restraint for interference with medical device):   Determine that other, less restrictive measures have been tried or would not be effective before applying the restraint   Evaluate the patient's condition at the time of restraint application   Inform patient/family regarding the reason for restraint   Every 2 hours: Monitor safety, psychosocial status, comfort, nutrition and hydration     Problem: Respiratory - Adult  Goal: Achieves optimal ventilation and oxygenation  2/2/2023 2050 by Leellen Cheadle, RN  Outcome: Progressing  Flowsheets (Taken 2/2/2023 2000)  Achieves optimal ventilation and oxygenation:   Assess for changes in respiratory status   Assess for changes in mentation and behavior   Position to facilitate oxygenation and minimize respiratory effort   Oxygen supplementation based on oxygen saturation or arterial blood gases   Initiate smoking cessation protocol as indicated   Encourage broncho-pulmonary hygiene including cough, deep breathe, incentive spirometry   Assess and instruct to report shortness of breath or any respiratory difficulty   Respiratory therapy support as indicated  2/2/2023 1836 by Josue Galloway RN  Outcome: Progressing  Flowsheets (Taken 2/1/2023 2055 by Ruddy Suarez RN)  Achieves optimal ventilation and oxygenation:   Assess for changes in respiratory status   Assess for changes in mentation and behavior   Position to facilitate oxygenation and minimize respiratory effort   Oxygen supplementation based on oxygen saturation or arterial blood gases   Encourage broncho-pulmonary hygiene including cough, deep breathe, incentive spirometry   Assess the need for suctioning and aspirate as needed   Assess and instruct to report shortness of breath or any respiratory difficulty     Problem: Cardiovascular - Adult  Goal: Maintains optimal cardiac output and hemodynamic stability  2/2/2023 2050 by Shauna Santana RN  Outcome: Progressing  Flowsheets (Taken 2/2/2023 2000)  Maintains optimal cardiac output and hemodynamic stability:   Monitor blood pressure and heart rate   Monitor urine output and notify Licensed Independent Practitioner for values outside of normal range   Assess for signs of decreased cardiac output   Administer fluid and/or volume expanders as ordered  2/2/2023 1836 by Josue Galloway RN  Outcome: Progressing  [address] (Taken 2/1/2023 2055 by Ruddy Suarez RN)  Maintains optimal cardiac output and hemodynamic stability:   Monitor blood pressure and heart rate   Monitor urine output and notify Licensed Independent Practitioner for values outside of normal range   Assess for signs of decreased cardiac output   Administer fluid and/or volume expanders as ordered  Goal: Absence of cardiac dysrhythmias or at baseline  2/2/2023 2050 by Shant Bronson RN  Outcome: Progressing  Flowsheets (Taken 2/2/2023 2000)  Absence of cardiac dysrhythmias or at baseline:   Monitor cardiac rate and rhythm   Assess for signs of decreased cardiac output   Administer antiarrhythmia medication and electrolyte replacement as ordered  2/2/2023 1836 by Jenny Pozo RN  Outcome: Progressing  Flowsheets (Taken 2/1/2023 2055 by Ignacio Sutton RN)  Absence of cardiac dysrhythmias or at baseline:   Monitor cardiac rate and rhythm   Assess for signs of decreased cardiac output   Administer antiarrhythmia medication and electrolyte replacement as ordered     Problem: Skin/Tissue Integrity - Adult  Goal: Skin integrity remains intact  2/2/2023 2050 by Shant Bronsno RN  Outcome: Progressing  Flowsheets (Taken 2/2/2023 2000)  Skin Integrity Remains Intact: Monitor for areas of redness and/or skin breakdown  2/2/2023 1836 by Jenny Pozo RN  Outcome: Progressing  Flowsheets (Taken 2/1/2023 2055 by Ignacio Sutton RN)  Skin Integrity Remains Intact: Monitor for areas of redness and/or skin breakdown  Goal: Incisions, wounds, or drain sites healing without S/S of infection  2/2/2023 2050 by Shant Bronson RN  Outcome: Progressing  Flowsheets (Taken 2/2/2023 2000)  Incisions, Wounds, or Drain Sites Healing Without Sign and Symptoms of Infection:   TWICE DAILY: Assess and document skin integrity   TWICE DAILY: Assess and document dressing/incision, wound bed, drain sites and surrounding tissue   Initiate pressure ulcer prevention bundle as indicated  2/2/2023 1836 by Jenny Pozo RN  Outcome: Progressing  Flowsheets (Taken 2/1/2023 2055 by Ignacio Sutton RN)  Incisions, Wounds, or Drain Sites Healing Without Sign and Symptoms of Infection:   TWICE DAILY: Assess and document skin integrity   TWICE DAILY: Assess and document dressing/incision, wound bed, drain sites and surrounding tissue   Initiate pressure ulcer prevention bundle as indicated     Problem: Genitourinary - Adult  Goal: Urinary catheter remains patent  2/2/2023 2050 by Tere Portillo RN  Outcome: Progressing  Flowsheets (Taken 2/2/2023 2000)  Urinary catheter remains patent: Assess patency of urinary catheter  2/2/2023 1836 by Vamsi Carter RN  Outcome: Progressing  Flowsheets (Taken 2/1/2023 2055 by Apolonia Beck RN)  Urinary catheter remains patent: Assess patency of urinary catheter     Problem: Infection - Adult  Goal: Absence of infection at discharge  2/2/2023 2050 by Tere Portilol RN  Outcome: Progressing  Flowsheets (Taken 2/2/2023 2000)  Absence of infection at discharge:   Assess and monitor for signs and symptoms of infection   Monitor lab/diagnostic results   Monitor all insertion sites i.e., indwelling lines, tubes and drains   Administer medications as ordered   Instruct and encourage patient and family to use good hand hygiene technique  2/2/2023 1836 by Vamsi Carter RN  Outcome: Progressing  Flowsheets (Taken 2/1/2023 2055 by Apolonia Beck RN)  Absence of infection at discharge:   Assess and monitor for signs and symptoms of infection   Monitor lab/diagnostic results   Monitor all insertion sites i.e., indwelling lines, tubes and drains   Administer medications as ordered   Instruct and encourage patient and family to use good hand hygiene technique  Goal: Absence of infection during hospitalization  2/2/2023 2050 by Tere Portillo RN  Outcome: Progressing  Flowsheets (Taken 2/2/2023 2000)  Absence of infection during hospitalization:   Assess and monitor for signs and symptoms of infection   Monitor lab/diagnostic results   Monitor all insertion sites i.e., indwelling lines, tubes and drains   Administer medications as ordered   Instruct and encourage patient and family to use good hand hygiene technique  2/2/2023 1836 by Vamsi Carter RN  Outcome: Progressing  Flowsheets (Taken 2/1/2023 2055 by Carlos Rolle Sheryn Bunk, RN)  Absence of infection during hospitalization:   Assess and monitor for signs and symptoms of infection   Monitor lab/diagnostic results   Monitor all insertion sites i.e., indwelling lines, tubes and drains   Administer medications as ordered   Instruct and encourage patient and family to use good hand hygiene technique     Problem: Metabolic/Fluid and Electrolytes - Adult  Goal: Electrolytes maintained within normal limits  Recent Flowsheet Documentation  Taken 2/2/2023 2000 by Luci Asencio RN  Electrolytes maintained within normal limits:   Monitor labs and assess patient for signs and symptoms of electrolyte imbalances   Administer electrolyte replacement as ordered   Monitor response to electrolyte replacements, including repeat lab results as appropriate   Instruct patient on fluid and nutrition restrictions as appropriate  2/2/2023 1836 by Leonor Castañeda RN  Outcome: Progressing  Flowsheets (Taken 2/1/2023 2055 by Kolby Barton, RN)  Electrolytes maintained within normal limits:   Monitor labs and assess patient for signs and symptoms of electrolyte imbalances   Administer electrolyte replacement as ordered   Monitor response to electrolyte replacements, including repeat lab results as appropriate     Problem: Neurosensory - Adult  Goal: Achieves stable or improved neurological status  2/2/2023 2050 by Luci Asencio RN  Outcome: Not Progressing  Flowsheets (Taken 2/2/2023 2000)  Achieves stable or improved neurological status: Assess for and report changes in neurological status  2/2/2023 1836 by Leonor Castañeda RN  Outcome: Progressing  Flowsheets (Taken 2/1/2023 2055 by Kolby Barton RN)  Achieves stable or improved neurological status: Assess for and report changes in neurological status

## 2023-02-03 NOTE — PROGRESS NOTES
Cardiac Electrophysiology Progress Note     Admit Date: 2023     Reason for follow up: atrial fibrillation with RVR    HPI and Interval History:   Dominic Li is a 68 y.o. with past medical history noted below who is s/p open repair of abdominal aortic aneurysm using a 22 mm tube graft with supra-renal clamp 2022. Total suprarenal clamp time was about 25 minutes. EP/cardiology consulted this morning for post op atrial fibrillation with RVR     Per the patient and his wife (who has atrial fibrillation), he's never been diagnosed with atrial fibrillation or other cardiac illnesses. He is not aware that he is in atrial fibrillation. Continues to be very delirious overnight. Currently sleeping on Precedex. In SB with rates in the low 50s, RN weaning Precedex and holding metoprolol this morning. On PO amiodarone. Physical Examination:  Vitals:    23 0800   BP: 109/64   Pulse: 68   Resp: 23   Temp: 98.8 °F (37.1 °C)   SpO2:         Intake/Output Summary (Last 24 hours) at 2/3/2023 0846  Last data filed at 2/3/2023 0748  Gross per 24 hour   Intake 2149.1 ml   Output 6275 ml   Net -4125.9 ml     In: 2199.1 [I.V.:1969.1; NG/GT:130]  Out: 6350    Wt Readings from Last 3 Encounters:   23 207 lb 14.3 oz (94.3 kg)   01/10/23 197 lb 3.2 oz (89.4 kg)   22 193 lb (87.5 kg)     Temp  Av.4 °F (36.9 °C)  Min: 98 °F (36.7 °C)  Max: 98.8 °F (37.1 °C)  Pulse  Av.4  Min: 59  Max: 104  BP  Min: 105/80  Max: 178/76  SpO2  Av.2 %  Min: 82 %  Max: 99 %    Telemetry: SB in the 50s. Constitutional: Alert, in no acute distress. Appears stated age. Head: Normocephalic and atraumatic. Eyes: Conjunctivae normal. EOM are normal.   Neck: Neck supple. No lymphadenopathy. No rigidity. No JVD present. Cardiovascular: Normal rate, regular rhythm. No murmurs, rubs or gallops. No S3 or S4.  Pulmonary/Chest: Clear breath sounds bilaterally. No crackles, wheezes or rhonchi.  No respiratory accessory muscle use. Abdominal: Soft. Normal bowel sounds present. No distension, No tenderness. Musculoskeletal: No tenderness. No edema    Lymphadenopathy: Has no cervical adenopathy. Neurological: Alert and confused. Unable to follow commands. Skin: Skin is warm and dry. No rash, lesions, ulcerations noted. Psychiatric: Agitated. Labs, diagnostic and imaging results reviewed. Reviewed. Recent Labs     02/01/23 0400 02/02/23 0422 02/03/23 0404   * 148* 147*   K 4.5 3.7 4.1   * 113* 111*   CO2 23 23 22   BUN 24* 26* 28*   CREATININE 1.9* 1.5* 1.3     Recent Labs     02/01/23 0400 02/02/23 0422 02/03/23  0404   WBC 11.7* 13.9* 11.7*   HGB 8.1* 7.4* 7.5*   HCT 25.3* 23.4* 23.2*   MCV 91.4 91.6 91.1   * 104* 86*     No results found for: CKTOTAL, CKMB, CKMBINDEX, TROPONINI  Estimated Creatinine Clearance: 55 mL/min (based on SCr of 1.3 mg/dL).    No results found for: BNP  Lab Results   Component Value Date/Time    PROTIME 17.8 02/01/2023 04:00 AM    PROTIME 17.3 01/31/2023 03:48 PM    PROTIME 20.4 01/31/2023 10:45 AM    INR 1.47 02/01/2023 04:00 AM    INR 1.42 01/31/2023 03:48 PM    INR 1.75 01/31/2023 10:45 AM     Lab Results   Component Value Date/Time    CHOL 164 09/13/2022 11:50 AM    HDL 42 09/13/2022 11:50 AM    HDL 46 04/18/2012 01:06 PM    TRIG 181 09/13/2022 11:50 AM       Scheduled Meds:   escitalopram  15 mg Oral Daily    metoprolol tartrate  25 mg Oral BID    gabapentin  300 mg Oral TID    amLODIPine  10 mg Oral Daily    amiodarone  400 mg Oral BID    Followed by    Osmar Colbert ON 2/10/2023] amiodarone  200 mg Oral Daily    enoxaparin Sodium  30 mg SubCUTAneous Daily    famotidine (PEPCID) injection  10 mg IntraVENous BID    [START ON 2/4/2023] Levothyroxine Sodium  50 mcg IntraVENous Daily    sodium chloride flush  5-40 mL IntraVENous 2 times per day    nicotine  1 patch TransDERmal Daily     Continuous Infusions:   dexmedetomidine 1.5 mcg/kg/hr (02/03/23 0726) dextrose 5 % and 0.45 % NaCl 50 mL/hr at 23 0726    sodium chloride Stopped (23 1143)    phenylephrine (JAIME-SYNEPHRINE) 50 mg/250 mL infusion 10 mcg/min (23 0600)    sodium chloride       PRN Meds:HYDROmorphone **OR** HYDROmorphone, metoprolol, hydrALAZINE, perflutren lipid microspheres, phenol, sodium chloride flush, sodium chloride, ondansetron **OR** ondansetron, sodium chloride     EC23  Atrial fibrillation at 120 BPM. Low voltage. Non-specific ST-T wave changes. Echo:23  Left ventricular cavity size is normal.   There is mildly increased left ventricular wall thickness. Overall left ventricular systolic function appears hyperdynamic. Ejection fraction is visually estimated to be 65-70%. Indeterminate diastolic function. Mitral valve leaflets appear mildly thickened. Trivial mitral regurgitation. .   The left atrium is moderately dilated. The right ventricle is normal in size and function. Mild to moderate tricuspid regurgitation. Stress: 1/3/23   The overall quality of the study is good. There is subdiaphragmatic    attenuation. Left ventricular cavity size is mildly dilated. Right ventricle is normal in    size. SPECT images demonstrate homogeneous tracer distribution throughout the    myocardium. There is normal isotope uptake at stress and rest. There is no    evidence of myocardial ischemia or scar. Gated spect reveals normal    myocardial thickening and wall motion. Sum stress score of 0. No visual TID. Calculated TID 1. 13. Left ventricular ejection is normal at 67%. Sensitivity of testing is reduced on betablocker. **Myocardial perfusion is normal. No reversible ischemia. Low-moderate risk    scan given mildly dilated LV volume. **       Assessment and Plan:     Paroxysmal atrial fibrillation   - first seen on EKG on 23 following AAA repair   - converted to SR on amiodarone after about 20 hours of duration   - echo with moderately dilated LA so may not be a new problem   - continue PO amiodarone load, will prescribe to pharmacy   - can resume metoprolol, okay to hold while bradycardic on Precedex   - CHADS2-VASc 4 (age, HTN, vascular disease) given brief duration while post-op, can hold off an anticoagulation for now but if has recurrence, will need to consider DOAC   - plan for 30 day monitor at f/u once healed from surgery    AAA without rupture   - s/p open AAA repair on 1/31/23   - care per vascular    Essential HTN   - BP improved with pt being calm   - reordered home metoprolol and amlodipine, okay to hold if BP acceptable   - other BP management per vascular    EP issues are stable, will sign off and arrange follow up.     DEDRA Motley  [facility], [address] [place] [address], [address] [place] [address]  Phone: [phone]  Fax: [phone]    Electronically signed by DEDRA Hatch - CNP on 2/3/2023 at 8:46 AM

## 2023-02-03 NOTE — PROGRESS NOTES
[facility]  Progress Note    2/3/2023 9:20 AM    Chief Complaint: AAA     POD #3 Open AAA Repair with Dr. Win Draft    Subjective: Pt seen resting in bed in CVU, soft wrist and ankle restraints in place, pt awoke during assessment, pleasant, I re-oriented him to situation, he was reasonable and did not seem agitated, denied abdominal pain      Vital Signs:  Vitals:    02/03/23 0600 02/03/23 0635 02/03/23 0757 02/03/23 0800   BP: 105/80   109/64   Pulse: 90  70 68   Resp: 27 26 29 23   Temp:    98.8 °F (37.1 °C)   TempSrc:    Axillary   SpO2: 98%  99%    Weight:       Height:           I/O:    Intake/Output Summary (Last 24 hours) at 2/3/2023 0920  Last data filed at 2/3/2023 0748  Gross per 24 hour   Intake 2149.1 ml   Output 6150 ml   Net -4000.9 ml         Physical Exam:   General: alert, disoriented to place and situation, afebrile  Chest/Lungs: non labored breathing no tachypnea, retractions or cyanosis  Cardiac: regular rate and rhythm  Abdomen: soft, distended, non-tender, JESSICA dressing to midline incision with mild strike-through drainage, abdominal binder in place  Extremities: normal strength, tone, and muscle mass, no deformities, no significant edema to BLE  Vascular: extremities warm and well perfused, no signs of cyanosis or ischemia, able to move BUE and BLE to command  Pulses:  -R DP: +2/4 palpable  -L DP: +2/4 palpable    Labs:   Lab Results   Component Value Date/Time     02/03/2023 04:04 AM    K 4.1 02/03/2023 04:04 AM     02/03/2023 04:04 AM    CO2 22 02/03/2023 04:04 AM    BUN 28 02/03/2023 04:04 AM    CREATININE 1.3 02/03/2023 04:04 AM    GFRAA >60 09/13/2022 11:50 AM    GFRAA >60 04/26/2013 09:53 AM    LABGLOM 56 02/03/2023 04:04 AM    GLUCOSE 125 02/03/2023 04:04 AM    GLUCOSE 113 08/01/2011 08:50 AM    MG 2.20 02/03/2023 04:04 AM    CALCIUM 8.2 02/03/2023 04:04 AM     Lab Results   Component Value Date/Time    WBC 11.7 02/03/2023 04:04 AM    RBC 2.55 02/03/2023 04:04 AM    HGB 7.5 02/03/2023 04:04 AM    HCT 23.2 02/03/2023 04:04 AM    MCV 91.1 02/03/2023 04:04 AM    RDW 16.1 02/03/2023 04:04 AM    PLT 86 02/03/2023 04:04 AM     Lab Results   Component Value Date    INR 1.47 (H) 02/01/2023    PROTIME 17.8 (H) 02/01/2023        Scheduled Meds:    escitalopram  15 mg Oral Daily    metoprolol tartrate  25 mg Oral BID    gabapentin  300 mg Oral TID    amLODIPine  10 mg Oral Daily    amiodarone  400 mg Oral BID    Followed by    Malik Yi ON 2/10/2023] amiodarone  200 mg Oral Daily    enoxaparin Sodium  30 mg SubCUTAneous Daily    famotidine (PEPCID) injection  10 mg IntraVENous BID    [START ON 2/4/2023] Levothyroxine Sodium  50 mcg IntraVENous Daily    sodium chloride flush  5-40 mL IntraVENous 2 times per day    nicotine  1 patch TransDERmal Daily     Continuous Infusions:    dexmedetomidine 1.5 mcg/kg/hr (02/03/23 0850)    sodium chloride Stopped (02/02/23 1143)    phenylephrine (JAIME-SYNEPHRINE) 50 mg/250 mL infusion 10 mcg/min (02/01/23 0600)    sodium chloride         Assessment:   -POD #3 Open AAA Repair with Dr. Terese Reyes remains in place to mid-line incision with small amount of drainage noted  -UOP - 3300 overnight  -NG with 1500 out overnight   -A-fib - now in NSR - Cardiology following  -Continued to be confused/agitated overnight - pt again did not sleep overnight per nurse  -Creatinine 1.3 (1.5 yesterday)  -Magnesium 2.2 today after replacement yesterday  -Hgb 7.5 (7.4 yesterday) - transfuse for Hgb < 7    Plan:  -Continue NG Tube  -Check KUB for ileus  -Suppository added  -Precedex infusion for agitation/confusion  -Psych consulted for agitation/confusion  -Critical Care consulted for agitation/confusion/medical management  -Continue abdominal binder  -Encourage IS  -Stop IVF  -Check ABG  -Remove A-line  -Restarted home dose Remeron this evening - d/w pharmacist, may need to adjust Precedex dosage after administration, given significant agitation/confusion despite max dose of Precedex currenlty, reasonable to restart at this time per pharmacist, d/w nursing for close monitoring after administration, d/w Psych and may need dose adjustment at discretion of Psych     All pertinent information and plan of care discussed with Dr. Minnie Robb. All questions and concerns were addressed with the patient. I have discussed the above stated plan with the patient and the nurse. The patient verbalized understanding and agreed with the plan.     Thank you for allowing to us to participate in the care of Lamberto Yancey      Electronically signed by DEDRA Prater CNP on 2/3/2023 at 9:20 AM

## 2023-02-03 NOTE — PLAN OF CARE
Problem: Neurosensory - Adult  Goal: Achieves stable or improved neurological status  2/2/2023 2050 by Dipika Rowley RN  Outcome: Not Progressing  Flowsheets (Taken 2/2/2023 2000)  Achieves stable or improved neurological status: Assess for and report changes in neurological status  2/2/2023 1836 by Renato Verma RN  Outcome: Progressing  Flowsheets (Taken 2/1/2023 2055 by Anay Newell RN)  Achieves stable or improved neurological status: Assess for and report changes in neurological status     Problem: Confusion  Goal: Confusion, delirium, dementia, or psychosis is improved or at baseline  Description: INTERVENTIONS:  1. Assess for possible contributors to thought disturbance, including medications, impaired vision or hearing, underlying metabolic abnormalities, dehydration, psychiatric diagnoses, and notify attending LIP  2. [place] high risk fall precautions, as indicated  3. Provide frequent short contacts to provide reality reorientation, refocusing and direction  4. Decrease environmental stimuli, including noise as appropriate  5. Monitor and intervene to maintain adequate nutrition, hydration, elimination, sleep and activity  6. If unable to ensure safety without constant attention obtain sitter and review sitter guidelines with assigned personnel  7.  Initiate Psychosocial CNS and Spiritual Care consult, as indicated  Outcome: Not Progressing

## 2023-02-03 NOTE — PROGRESS NOTES
Patient attempting to kick RN, cannot safely reapply ankle restraints. When approaching patient's legs to reapply restraints patient stated \"If you touch my legs I will kick you right in the throat and kill you\". Will wait until patient is calmer before attempting to reapply ankle restraints.     Abilio Anthony, RN

## 2023-02-03 NOTE — PROGRESS NOTES
CLINICAL PHARMACY NOTE: MEDS TO BEDS    Discharge medications are ready in inpatient pharmacy for weekend delivery.     02/03/23 2:47 PM

## 2023-02-03 NOTE — PROGRESS NOTES
RN educated patient about procedure of A Line removal. Patient and his wife who is present at beside verbalized understanding and agreement. All questions answered. Arterial line removed by facility policy as ordered. Minimal bleeding noted from site. Dressed with clean dressing and tegederm. Tolerated removal very well. Patient remains in four point restraints. Brusing is noted around arterial line insertion and posterior forearm just before elbow. Patient denies pain, or discomfort.     Electronically signed by Filippo Poole RN on 2/3/2023 at 10:18 AM

## 2023-02-03 NOTE — PROGRESS NOTES
Pt requested calming music. RN turned on brown noise per his request.  Vinnie So yeah that is gorgeous\"     Pt also reports passing gas, and upon auscultation writer hears increased bowel sounds from morning assessment in lower left and right quadrants. No tenderness or rebound pain while palpating abdomen or auscultating.     Pt states \"Im going to close my eyes and take a nap\"    Electronically signed by Jillian Bullard RN on 2/3/2023 at 2:20 PM

## 2023-02-03 NOTE — CONSULTS
PSYCHIATRY CONSULT, INITIAL EVALUATION    Referring Provider:  Jayant Horn DO    CC/Reason for Consult: \" Erratic behavior, delusional.\"     Psychiatric Dx:    Agitation vs delirium     2. EILEEN    3. Mood disorder    Assessment   This a 77 y. O male came with infrarenal aortic aneurysm without rupture and went under open repair of abdominal aortic aneurysm using a 22 mm tube graft with supra-renal clamp 1/31/2022. He has PMHx AAA, HTN, hypothyroidism, and psych hx anxiety, depression, and mood disorder. Psychiatry was consulted for erratic behavior and delusional thoughts. Predisposing factors includes     RECOMMENDATIONS:     Psychiatric  1.  Restart home psychotropic medications: Remeron 30 mg/ nightly ( home dose 45 mg nightly), Increase Lexapro to 20 mg/daily, Ativan 0.5 mg BID ( home dose 1 mg BID).  Seroquel 25 mg TID PRN for agitation/ Restlessness. Monitor for QT/QTc prolongation.     2.  OARRS reviewed. Last refill for Ativan 1 mg BID was 1/9/23, Gabapentin 600 mg BID was 11/10/22.  May increase Remeron and Ativan to home dose when patient weaned off sedation medication.     3.  Monitor for respiratory depression with concurrent use of psychotropic medications like Ativan and Remeron with other sedative mediations.     4.  Decrease stimulation and reorient patient to situations and surroundings frequently.     5. Will continue to follow with patient.     Dispo: Pending improvement of behavioral disturbance.   Safety: RF Depression, anxiety, mood disorder, extended hospital stay. Patient is moderate risk to self at the time my assessment due to disorientation.   Pink slip:   __________________________________________________________________________    HPI:   context:   The patient came with infrarenal aortic aneurysm without rupture and went under open repair of abdominal aortic aneurysm using a 22 mm tube graft with supra-renal clamp 1/31/2022. A day after the procedure the patient started getting  confused,  disoriented, and threatening staff. He feels the staff here \"kidnapped him and keep him hostage\" he states states, \" I am terrified, I feel I am murdered. \"  He repeatedly asked me when he will be leaving as he is scared of people. He states he anxious, and worried about if he will make through this time. He has delusional thoughts about staff attempting to harm and after him to get him. This could be due to pain mediation use or disorientation or withdrawal from psychotropic medications. He denies suicidal thoughts or homicidal thoughts. associated symptoms: confusion, disorientation, combative behavior, Restlessness, insomnia  modifying factors: Restart home psychotropic medications, attempt to wean off restraints as gets more oriented. Timing: acute   duration: three days   severity: moderate     Collateral information: The patient's wife ( Mrs Nora Jackson) was at the bed side and provided me with pertinent patient information. The patient has been seeing psychiatrist for years ( last provider Dr Denzel Rangel,  psychiatrist ) with whom the patient is active in counseling and psychotropic mediation management. ROS: + SOB with agitation.  Negative for CP, HA, blurry vision, dysuria ( jennings in place)      Past Psychiatric History:       Hosp: denies        Diagnoses: PSTD, depression, anxiety, mood disorder       Med trials: Seroquel, Depakote        Outpt: counseling with psychiatrist Aleisha Preciado)       Suicide Attempts: denies     Substance Use History:     Nicotine: smokes 0.5 pack daily     Alcohol: social drinker      Illicits: denies     Past Medical History:   Past Medical History:   Diagnosis Date    AAA (abdominal aortic aneurysm)     Anxiety     Depression     Hyperlipidemia     Hypertension     Hypothyroidism     Iliac artery stenosis, left ([facility] [address] 09/16/2021     Past Surgical History:   Procedure Laterality Date    ABDOMINAL AORTIC ANEURYSM REPAIR N/A 1/31/2023    DIRECT REPAIR OF INFRARENAL ABDOMINAL AORTIC ANEURYSM performed by Sienna Wagner DO at [facility]  N/A 05/13/2021    EXAM UNDER ANESTHESIA, BOTOX INJECTION ANAL FISSURE performed by Olimpia Cordero MD at [address]  09/12/2014    Dr Yolis Clarke / Lizeth Gonzalez HISTORY  01/29/2015    neck cyst excision       Social/Developmental History:     Hx:Relationship/ children/housing/ occupation/ Legal/ abuse  - Born and raised [place], [place]. He has no siblings.  for 49 yrs. He has one daughter ( in [place] ([facility] [place] Lives with wife in a condo. 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 Denies any legal issue. Denies any abuse. Access to firearms: Yes, not in safe or lacked at this- family advised to secure the access. Family History:   Family History   Adopted: Yes   Problem Relation Age of Onset    High Cholesterol Mother     Heart Attack Mother     Heart Disease Mother        Psychiatric: adopted patient. History of completed suicide: unknown. Allergies: Allergies   Allergen Reactions    Lidocaine Other (See Comments)     Burning sensation         Home Medications:   No current facility-administered medications on file prior to encounter. Current Outpatient Medications on File Prior to Encounter   Medication Sig Dispense Refill    amLODIPine (NORVASC) 10 MG tablet TAKE 1 TABLET EVERY DAY 90 tablet 3    lisinopril (PRINIVIL;ZESTRIL) 30 MG tablet TAKE 1 TABLET EVERY DAY 90 tablet 3    levothyroxine (SYNTHROID) 88 MCG tablet TAKE 1 TABLET EVERY DAY 90 tablet 3    simvastatin (ZOCOR) 20 MG tablet TAKE 1 TABLET AT BEDTIME 90 tablet 3    metoprolol tartrate (LOPRESSOR) 25 MG tablet TAKE 1 TABLET DAILY 90 tablet 3    gabapentin (NEURONTIN) 300 MG capsule Take 600 mg by mouth in the morning and at bedtime. mirtazapine (REMERON) 45 MG tablet Take 45 mg by mouth nightly      LORazepam (ATIVAN) 1 MG tablet Take 1 mg by mouth in the morning and at bedtime. escitalopram (LEXAPRO) 10 MG tablet Take 15 mg by mouth daily       aspirin 81 MG tablet Take 81 mg by mouth daily. Medications:  Scheduled Meds:   bisacodyl  10 mg Rectal Daily    escitalopram  15 mg Oral Daily    metoprolol tartrate  25 mg Oral BID    gabapentin  300 mg Oral TID    amLODIPine  10 mg Oral Daily    amiodarone  400 mg Oral BID    Followed by    Calixto Lean ON 2/10/2023] amiodarone  200 mg Oral Daily    enoxaparin Sodium  30 mg SubCUTAneous Daily    famotidine (PEPCID) injection  10 mg IntraVENous BID    [START ON 2/4/2023] Levothyroxine Sodium  50 mcg IntraVENous Daily    sodium chloride flush  5-40 mL IntraVENous 2 times per day    nicotine  1 patch TransDERmal Daily     PRN Meds:. HYDROmorphone **OR** HYDROmorphone, metoprolol, hydrALAZINE, perflutren lipid microspheres, phenol, sodium chloride flush, sodium chloride, ondansetron **OR** ondansetron, sodium chloride    OBJECTIVE:  Height: 5' 10\" (1.778 m), Weight: 207 lb 14.3 oz (94.3 kg), BP: 109/64, Pain 0-10: Pain Level: 0, Location: Mid abdomen;     MSE:   Appearance    alert, mild distress, in hospital gown   Motor: Normal strength and tone, No abnormal movements, tics or mannerisms. Speech    spontaneous  Language    0 - no aphasia, normal  Mood/Affect    Anxious  Irritability / agitation  Thought Process    incoherent at times   Thought Content    excessive preoccupations ( wants to go home)  , no suicidal ideation  Associations    logical connections, tangential connections  Attention/Concentration    impaired  Orientation    oriented to person, place, time, and general circumstances  Memory   remote memory intact, recent memory impaired.    Fund of Knowledge    impaired  Insight/Judgement    Poor / Impaired    Labs:     Lab Results   Component Value Date    TSH 2.19 04/26/2013     No results found for: [id number]  No results found for: FOLATE    Last Drug screen:   Lab Results   Component Value Date    PHUR 7.0 11/04/2021 Imaging:  CT Head:  No results found for this or any previous visit. MRI Brain: No results found for this or any previous visit. EKG:     Vitals:  HR 88 PVC 2 QTc ??? ?QTc ???  QT ??? SpO? 89 Pulse ? SpO?? 84 Perf 2.2  /56 ?78? CVP ?17? RR 19 ST?I ?0.2  ST? II ?0.6 ST? III ?0.5 ST?aVR 0.4 ST?aVL 0.1  ST?aVF ?0.4 ST? V 0.3  2/2/2023 03:00:00 Saved strip re?labeled to Sinus Rhythm w/ PVCs MD 0.15 QRS 0.07 RR 0.68 QT 0.33 QTc 0.40    Marquise Robledo DNP, PMHNP-BC, CNP  [facility]  Thank you for this consult, please call the psychiatry consult line for further questions.

## 2023-02-03 NOTE — PROGRESS NOTES
Lucia Concepcion called RN to bedside to alert RN that patient c/o trouble breathing. VSS. Respirations 20-25. O2 sat 99% on 2 liters placed for comfort     RN removed abdominal binder, and patient states \"that feels so much better\"    RN encouraged incentive spirometry and patient demonstrated proper use of it.      Electronically signed by Inge Howard RN on 2/3/2023 at 3:06 PM

## 2023-02-03 NOTE — PROGRESS NOTES
Patient remains extremely agitated and confused; attempting to pull jennings out, kicking at staff despite ankle restraints, and cursing at staff members. Patient throwing himself forward in bed, advised patient for his safety not to strain himself as he could injure himself or affect his abdominal incision. States \"we are trying to interrogate him, and (he) doesn't know anything please let (him) go! \" Also believes RN is \"torturing him for fun\" and \"everyone here keeps trying to kill me\" and occasionally just yells \"Help\" over and over loudly. All attempts to reorient patient have failed. Precedex maxed at 1.5 mcg/kg/hr, working intermittently, patient will have periods of sleep, followed by more agitation and screaming. Will continue to monitor patient closely. 1:1 care currently continues.     Dipkia Rowley RN

## 2023-02-04 LAB
ANION GAP SERPL CALCULATED.3IONS-SCNC: 12 MMOL/L (ref 3–16)
BUN BLDV-MCNC: 31 MG/DL (ref 7–20)
CALCIUM SERPL-MCNC: 8.3 MG/DL (ref 8.3–10.6)
CHLORIDE BLD-SCNC: 112 MMOL/L (ref 99–110)
CO2: 24 MMOL/L (ref 21–32)
CREAT SERPL-MCNC: 1.2 MG/DL (ref 0.8–1.3)
GFR SERPL CREATININE-BSD FRML MDRD: >60 ML/MIN/{1.73_M2}
GLUCOSE BLD-MCNC: 103 MG/DL (ref 70–99)
HCT VFR BLD CALC: 24.2 % (ref 40.5–52.5)
HEMOGLOBIN: 7.8 G/DL (ref 13.5–17.5)
MCH RBC QN AUTO: 29.6 PG (ref 26–34)
MCHC RBC AUTO-ENTMCNC: 32.4 G/DL (ref 31–36)
MCV RBC AUTO: 91.5 FL (ref 80–100)
PDW BLD-RTO: 16 % (ref 12.4–15.4)
PLATELET # BLD: 117 K/UL (ref 135–450)
PMV BLD AUTO: 8.1 FL (ref 5–10.5)
POTASSIUM SERPL-SCNC: 3.7 MMOL/L (ref 3.5–5.1)
RBC # BLD: 2.64 M/UL (ref 4.2–5.9)
SODIUM BLD-SCNC: 148 MMOL/L (ref 136–145)
WBC # BLD: 9.6 K/UL (ref 4–11)

## 2023-02-04 PROCEDURE — 6370000000 HC RX 637 (ALT 250 FOR IP): Performed by: NURSE PRACTITIONER

## 2023-02-04 PROCEDURE — 2500000003 HC RX 250 WO HCPCS: Performed by: NURSE PRACTITIONER

## 2023-02-04 PROCEDURE — 2580000003 HC RX 258: Performed by: STUDENT IN AN ORGANIZED HEALTH CARE EDUCATION/TRAINING PROGRAM

## 2023-02-04 PROCEDURE — 99232 SBSQ HOSP IP/OBS MODERATE 35: CPT | Performed by: INTERNAL MEDICINE

## 2023-02-04 PROCEDURE — 85027 COMPLETE CBC AUTOMATED: CPT

## 2023-02-04 PROCEDURE — 2100000000 HC CCU R&B

## 2023-02-04 PROCEDURE — 80048 BASIC METABOLIC PNL TOTAL CA: CPT

## 2023-02-04 PROCEDURE — 6370000000 HC RX 637 (ALT 250 FOR IP): Performed by: REGISTERED NURSE

## 2023-02-04 PROCEDURE — 6370000000 HC RX 637 (ALT 250 FOR IP): Performed by: INTERNAL MEDICINE

## 2023-02-04 PROCEDURE — 6370000000 HC RX 637 (ALT 250 FOR IP): Performed by: STUDENT IN AN ORGANIZED HEALTH CARE EDUCATION/TRAINING PROGRAM

## 2023-02-04 PROCEDURE — 2500000003 HC RX 250 WO HCPCS: Performed by: STUDENT IN AN ORGANIZED HEALTH CARE EDUCATION/TRAINING PROGRAM

## 2023-02-04 PROCEDURE — 94640 AIRWAY INHALATION TREATMENT: CPT

## 2023-02-04 PROCEDURE — 6360000002 HC RX W HCPCS: Performed by: NURSE PRACTITIONER

## 2023-02-04 PROCEDURE — 6360000002 HC RX W HCPCS: Performed by: STUDENT IN AN ORGANIZED HEALTH CARE EDUCATION/TRAINING PROGRAM

## 2023-02-04 PROCEDURE — 36592 COLLECT BLOOD FROM PICC: CPT

## 2023-02-04 RX ORDER — IPRATROPIUM BROMIDE AND ALBUTEROL SULFATE 2.5; .5 MG/3ML; MG/3ML
1 SOLUTION RESPIRATORY (INHALATION)
Status: DISCONTINUED | OUTPATIENT
Start: 2023-02-04 | End: 2023-02-07 | Stop reason: HOSPADM

## 2023-02-04 RX ADMIN — GABAPENTIN 300 MG: 300 CAPSULE ORAL at 14:04

## 2023-02-04 RX ADMIN — LORAZEPAM 0.5 MG: 0.5 TABLET ORAL at 21:03

## 2023-02-04 RX ADMIN — IPRATROPIUM BROMIDE AND ALBUTEROL SULFATE 1 AMPULE: 2.5; .5 SOLUTION RESPIRATORY (INHALATION) at 16:37

## 2023-02-04 RX ADMIN — MIRTAZAPINE 30 MG: 15 TABLET, FILM COATED ORAL at 21:02

## 2023-02-04 RX ADMIN — SODIUM CHLORIDE, PRESERVATIVE FREE 10 ML: 5 INJECTION INTRAVENOUS at 20:58

## 2023-02-04 RX ADMIN — BISACODYL 10 MG: 10 SUPPOSITORY RECTAL at 09:20

## 2023-02-04 RX ADMIN — DEXMEDETOMIDINE HYDROCHLORIDE 0.2 MCG/KG/HR: 400 INJECTION INTRAVENOUS at 13:00

## 2023-02-04 RX ADMIN — LORAZEPAM 0.5 MG: 0.5 TABLET ORAL at 09:19

## 2023-02-04 RX ADMIN — IPRATROPIUM BROMIDE AND ALBUTEROL SULFATE 1 AMPULE: 2.5; .5 SOLUTION RESPIRATORY (INHALATION) at 11:08

## 2023-02-04 RX ADMIN — AMIODARONE HYDROCHLORIDE 400 MG: 200 TABLET ORAL at 21:03

## 2023-02-04 RX ADMIN — HYDROMORPHONE HYDROCHLORIDE 0.5 MG: 1 INJECTION, SOLUTION INTRAMUSCULAR; INTRAVENOUS; SUBCUTANEOUS at 12:52

## 2023-02-04 RX ADMIN — AMIODARONE HYDROCHLORIDE 400 MG: 200 TABLET ORAL at 09:19

## 2023-02-04 RX ADMIN — MOMETASONE FUROATE AND FORMOTEROL FUMARATE DIHYDRATE 2 PUFF: 200; 5 AEROSOL RESPIRATORY (INHALATION) at 11:08

## 2023-02-04 RX ADMIN — GABAPENTIN 300 MG: 300 CAPSULE ORAL at 21:02

## 2023-02-04 RX ADMIN — HYDROMORPHONE HYDROCHLORIDE 0.5 MG: 1 INJECTION, SOLUTION INTRAMUSCULAR; INTRAVENOUS; SUBCUTANEOUS at 09:20

## 2023-02-04 RX ADMIN — GABAPENTIN 300 MG: 300 CAPSULE ORAL at 09:18

## 2023-02-04 RX ADMIN — Medication 10 MG: at 21:03

## 2023-02-04 RX ADMIN — METOPROLOL TARTRATE 25 MG: 25 TABLET, FILM COATED ORAL at 21:02

## 2023-02-04 RX ADMIN — IPRATROPIUM BROMIDE AND ALBUTEROL SULFATE 1 AMPULE: 2.5; .5 SOLUTION RESPIRATORY (INHALATION) at 20:53

## 2023-02-04 RX ADMIN — Medication 10 MG: at 09:19

## 2023-02-04 RX ADMIN — HYDROMORPHONE HYDROCHLORIDE 0.5 MG: 1 INJECTION, SOLUTION INTRAMUSCULAR; INTRAVENOUS; SUBCUTANEOUS at 04:26

## 2023-02-04 RX ADMIN — LEVOTHYROXINE SODIUM 50 MCG: 20 INJECTION, SOLUTION INTRAVENOUS at 10:55

## 2023-02-04 RX ADMIN — ENOXAPARIN SODIUM 30 MG: 100 INJECTION SUBCUTANEOUS at 09:19

## 2023-02-04 RX ADMIN — MOMETASONE FUROATE AND FORMOTEROL FUMARATE DIHYDRATE 2 PUFF: 200; 5 AEROSOL RESPIRATORY (INHALATION) at 20:53

## 2023-02-04 RX ADMIN — SODIUM CHLORIDE: 9 INJECTION, SOLUTION INTRAVENOUS at 22:24

## 2023-02-04 RX ADMIN — AMLODIPINE BESYLATE 10 MG: 10 TABLET ORAL at 10:46

## 2023-02-04 RX ADMIN — SODIUM CHLORIDE, PRESERVATIVE FREE 10 ML: 5 INJECTION INTRAVENOUS at 09:22

## 2023-02-04 RX ADMIN — ESCITALOPRAM OXALATE 20 MG: 10 TABLET, FILM COATED ORAL at 09:19

## 2023-02-04 ASSESSMENT — PAIN DESCRIPTION - DESCRIPTORS
DESCRIPTORS: BURNING
DESCRIPTORS: BURNING

## 2023-02-04 ASSESSMENT — PAIN DESCRIPTION - PAIN TYPE
TYPE: CHRONIC PAIN
TYPE: CHRONIC PAIN

## 2023-02-04 ASSESSMENT — PAIN DESCRIPTION - FREQUENCY
FREQUENCY: INTERMITTENT
FREQUENCY: CONTINUOUS

## 2023-02-04 ASSESSMENT — PAIN SCALES - GENERAL
PAINLEVEL_OUTOF10: 6
PAINLEVEL_OUTOF10: 7
PAINLEVEL_OUTOF10: 3
PAINLEVEL_OUTOF10: 7
PAINLEVEL_OUTOF10: 5
PAINLEVEL_OUTOF10: 0

## 2023-02-04 ASSESSMENT — PAIN DESCRIPTION - LOCATION
LOCATION: OTHER (COMMENT)
LOCATION: OTHER (COMMENT)
LOCATION: BUTTOCKS;PENIS

## 2023-02-04 ASSESSMENT — PAIN - FUNCTIONAL ASSESSMENT: PAIN_FUNCTIONAL_ASSESSMENT: ACTIVITIES ARE NOT PREVENTED

## 2023-02-04 NOTE — PROGRESS NOTES
Vascular    POD #4 open AAA   C/o penile pain  Alert and well oriented this am  Denies pain in incision  +flatus    AFVSS  Abd soft, incision intact +BS  Ext warm with palpable pedal pulses      Intake/Output Summary (Last 24 hours) at 2/4/2023 1011  Last data filed at 2/4/2023 0951  Gross per 24 hour   Intake 763.64 ml   Output 3960 ml   Net -3196.36 ml         Lab Results   Component Value Date    WBC 9.6 02/04/2023    HGB 7.8 (L) 02/04/2023    HCT 24.2 (L) 02/04/2023    MCV 91.5 02/04/2023     (L) 02/04/2023     Lab Results   Component Value Date    CREATININE 1.2 02/04/2023    BUN 31 (H) 02/04/2023     (H) 02/04/2023    K 3.7 02/04/2023     (H) 02/04/2023    CO2 24 02/04/2023     I/P:  POD #4 AAA  Will d/c jennings and NGT today. Keep NPO with sips of clears with meds  Ok to resume home meds later today if no nausea  OOB  Good diuresis yesterday      Jos Velez M.D., FACS.  2/4/2023  10:12 AM

## 2023-02-04 NOTE — PROGRESS NOTES
Restarted psychiatric medications yesterday. Sees Dr. Sharmaine Stroud outpatient. Stable today. No behavioral issues overnight or today. Can d/c home when medically stable. Denies AV hallucinations. Denies Paranoia. Denies Delusions. Denies SI. Denies HI. Continue home medication regiment. Will sign off. Please re-consult if needed    Yolanda Florentino, WYATT-PMHNP  Psychiatry

## 2023-02-04 NOTE — PROGRESS NOTES
4 Eyes Skin Assessment     NAME:  Miri Lambert  YOB: 1945  MEDICAL RECORD NUMBER:  [id number]    The patient is being assessed for  Shift Handoff    I agree that One RN have performed a thorough Head to Toe Skin Assessment on the patient. ALL assessment sites listed below have been assessed. Areas assessed by both nurses:    Head, Face, Ears, Shoulders, Back, Chest, Arms, Elbows, Hands, Sacrum. Buttock, Coccyx, Ischium, and Legs. Feet and Heels        Does the Patient have a Wound?  No noted wound(s)       Kristian Prevention initiated by RN: Yes   Wound Care Orders initiated by RN: NA    Pressure Injury (Stage 3,4, Unstageable, DTI, NWPT, and Complex wounds) if present place referral order by RN under : NA    New and Established Ostomies, if present place, referral order under : NA      Nurse 1 eSignature: Electronically signed by Aroldo Amor RN on 2/3/23 at 7:43 PM EST    **SHARE this note so that the co-signing nurse is able to place an eSignature**    Nurse 2 eSignature: {Esignature:[id number]}

## 2023-02-04 NOTE — PROGRESS NOTES
Pulmonary Progress Note    Date of Admission: 1/31/2023   LOS: 4 days     CC:No chief complaint on file. Follow-up altered mental status        Assessment/Plan     Acute metabolic encephalopathy  -Really does not seem that much of an issue at this time  -We will defer further med changes    Centrilobular emphysema  -Has have a history of smoking  -Mild wheezing today, will add on Dulera, has duo nebs      HPI/Subjective  No acute events overnight. Patient has no complaints. ROS:   No nausea  No Vomiting  No chest pain      Intake/Output Summary (Last 24 hours) at 2/4/2023 1042  Last data filed at 2/4/2023 0951  Gross per 24 hour   Intake 763.64 ml   Output 3960 ml   Net -3196.36 ml         PHYSICAL EXAM:   Blood pressure 124/80, pulse 69, temperature 98.5 °F (36.9 °C), temperature source Axillary, resp. rate 12, height 5' 10\" (1.778 m), weight 207 lb 14.3 oz (94.3 kg), SpO2 98 %.'  Gen:  No acute distress. Eyes: PERRL. Anicteric sclera. No conjunctival injection. ENT: No discharge. Posterior oropharynx clear. External appearance of ears and nose normal.  Neck: Trachea midline. No mass   Resp:  No crackles. No wheezes. No rhonchi. No dullness on percussion. CV: Regular rate. Regular rhythm. No murmur or rub. No edema. GI: Soft, Non-tender. Non-distended. +BS  Skin: Warm, dry, w/o erythema. Lymph: No cervical or supraclavicular LAD. M/S: No cyanosis. No clubbing. Neuro:  no focal neurologic deficit. Moves all extremities  Psych: Awake and alert, Oriented x 3. Judgement and insight appropriate. Mood stable.       Medications:    Scheduled Meds:   ipratropium-albuterol  1 ampule Inhalation Q4H WA    bisacodyl  10 mg Rectal Daily    LORazepam  0.5 mg Oral BID    escitalopram  20 mg Oral Daily    mirtazapine  30 mg Oral Nightly    metoprolol tartrate  25 mg Oral BID    gabapentin  300 mg Oral TID    amLODIPine  10 mg Oral Daily    amiodarone  400 mg Oral BID    Followed by    [facility] Parent ON 2/10/2023] amiodarone  200 mg Oral Daily    enoxaparin Sodium  30 mg SubCUTAneous Daily    famotidine (PEPCID) injection  10 mg IntraVENous BID    Levothyroxine Sodium  50 mcg IntraVENous Daily    sodium chloride flush  5-40 mL IntraVENous 2 times per day    nicotine  1 patch TransDERmal Daily       Continuous Infusions:   sodium chloride Stopped (02/03/23 1510)    sodium chloride 30 mL/hr at 02/03/23 2039    dexmedetomidine 0.4 mcg/kg/hr (02/03/23 2332)    sodium chloride Stopped (02/02/23 1143)    phenylephrine (JAIME-SYNEPHRINE) 50 mg/250 mL infusion 10 mcg/min (02/01/23 0600)       PRN Meds:  QUEtiapine, polyvinyl alcohol-povidone, sodium chloride, ipratropium-albuterol, HYDROmorphone **OR** HYDROmorphone, metoprolol, hydrALAZINE, perflutren lipid microspheres, phenol, sodium chloride flush, sodium chloride, ondansetron **OR** ondansetron    Labs reviewed:  CBC:   Recent Labs     02/02/23 0422 02/03/23  0404 02/04/23  0852   WBC 13.9* 11.7* 9.6   HGB 7.4* 7.5* 7.8*   HCT 23.4* 23.2* 24.2*   MCV 91.6 91.1 91.5   * 86* 117*     BMP:   Recent Labs     02/02/23 0422 02/03/23  0404 02/04/23  0852   * 147* 148*   K 3.7 4.1 3.7   * 111* 112*   CO2 23 22 24   BUN 26* 28* 31*   CREATININE 1.5* 1.3 1.2     LIVER PROFILE: No results for input(s): AST, ALT, LIPASE, BILIDIR, BILITOT, ALKPHOS in the last 72 hours. Invalid input(s): AMYLASE,  ALB  PT/INR: No results for input(s): PROTIME, INR in the last 72 hours. APTT: No results for input(s): APTT in the last 72 hours. UA:No results for input(s): NITRITE, COLORU, PHUR, LABCAST, WBCUA, RBCUA, MUCUS, TRICHOMONAS, YEAST, BACTERIA, CLARITYU, SPECGRAV, LEUKOCYTESUR, UROBILINOGEN, BILIRUBINUR, BLOODU, GLUCOSEU, AMORPHOUS in the last 72 hours. Invalid input(s): Nas Corpus  No results for input(s): PH, PCO2, PO2 in the last 72 hours. Films:  Radiology Review:  Pertinent images / reports were reviewed as a part of this visit.        XR ABDOMEN (KUB) (SINGLE AP VIEW)  Narrative: EXAMINATION:  ONE SUPINE XRAY VIEW(S) OF THE ABDOMEN    2/3/2023 9:39 am    COMPARISON:  Prior CT examinations of the abdomen/pelvis most recent 11/19/2022. HISTORY:  ORDERING SYSTEM PROVIDED HISTORY: Evaluate for Ileus, S/P Open AAA Repair  TECHNOLOGIST PROVIDED HISTORY:  Reason for exam:->Evaluate for Ileus, S/P Open AAA Repair  Reason for Exam: Evaluate for Ileus, S/P Open AAA Repair    FINDINGS:  There is gaseous distention of small and large bowel. There is mild-moderate  distention of multiple small bowel loops in the mid/lower abdomen and pelvis. Findings may be on the basis of postoperative ileus in this patient status  post repair of abdominal aortic aneurysm. Multiple left paramidline surgical  clips are identified. Multiple skin staples are identified about the  abdomen/pelvis. Gao catheter is in place. Visualized portions of the lung  bases are clear. Impression: Gaseous distention of small and large bowel may be on the basis of  postoperative ileus as described above. Short-term follow-up examination  recommended if clinically indicated. Access  CVC   Arterial          PICC             CVC        CVC Triple Lumen 01/31/23 (Active)   Central Line Being Utilized Yes 02/04/23 0800   Criteria for Appropriate Use Hemodynamically unstable, requiring monitoring lines, vasopressors, or volume resuscitation 02/04/23 0800   Site Assessment Dry; Intact 02/04/23 0800   Phlebitis Assessment No symptoms 02/04/23 0800   Infiltration Assessment 0 02/04/23 0800   Color/Movement/Sensation Capillary refill less than 3 sec 02/04/23 0800   Proximal Lumen Color/Status Normal saline locked 02/04/23 0800   Medial Lumen Status Infusing 02/04/23 0800   Distal Lumen Color/Status Transduced 02/04/23 0600   Line Care Connections checked and tightened 02/04/23 0800   Alcohol Cap Used Yes 02/04/23 0800   Date of Last Dressing Change 01/31/23 02/04/23 0800   Dressing Type Transparent; Antimicrobial 02/04/23 0800   Dressing Status Dry; Intact; Old drainage noted 02/04/23 0800   Number of days: 4            Gao  Urinary Catheter 01/31/23 2 Way (Active)   Catheter Indications Perioperative use for selected surgical procedures 02/04/23 0400   Site Assessment No urethral drainage 02/04/23 0400   Urine Color Yellow 02/04/23 0400   Urine Appearance Clear 02/04/23 0400   Collection Container Standard 02/04/23 0400   Securement Method Other (Comment) 02/04/23 0400   Catheter Care  Other (comment) 02/03/23 1600   Catheter Best Practices  Drainage tube clipped to bed;Catheter secured to thigh; Tamper seal intact; Bag below bladder;Bag not on floor; Lack of dependent loop in tubing;Drainage bag less than half full 02/04/23 0400   Status Draining;Patent 02/04/23 0400   Output (mL) 160 mL 02/04/23 0951   Number of days: 4         Thank you for this consult,    Linn Jackman MD  [facility] Pulmonary, Critical Care, and Sleep Medicine

## 2023-02-04 NOTE — PROGRESS NOTES
Patient transferred to chair by this RN and Tomasz Knutson RN, contact guard assistance. Patient c/o dizziness and dangled for a few minutes before standing. Unsteady gait after two steps. Patient resting up in chair with heels elevated. Patient states it feels good to change positions. Chair alarm on and call light within reach.

## 2023-02-04 NOTE — PROGRESS NOTES
1130: NGT and Gao catheter removed per verbal order from Dr. Wendi Mchugh. Patient's wife, Bronson Loo, at bedside. She states patient had a kidney stone in the past and difficulty urinating. He takes [employer] prostate plus health complex at home and was prescribed Flomax sometime in the past. Will monitor urine output and call vascular if patient is unable to void within time frame.

## 2023-02-05 PROCEDURE — 6370000000 HC RX 637 (ALT 250 FOR IP): Performed by: INTERNAL MEDICINE

## 2023-02-05 PROCEDURE — 6370000000 HC RX 637 (ALT 250 FOR IP): Performed by: NURSE PRACTITIONER

## 2023-02-05 PROCEDURE — 94640 AIRWAY INHALATION TREATMENT: CPT

## 2023-02-05 PROCEDURE — 6370000000 HC RX 637 (ALT 250 FOR IP): Performed by: STUDENT IN AN ORGANIZED HEALTH CARE EDUCATION/TRAINING PROGRAM

## 2023-02-05 PROCEDURE — 6360000002 HC RX W HCPCS: Performed by: STUDENT IN AN ORGANIZED HEALTH CARE EDUCATION/TRAINING PROGRAM

## 2023-02-05 PROCEDURE — 2580000003 HC RX 258: Performed by: STUDENT IN AN ORGANIZED HEALTH CARE EDUCATION/TRAINING PROGRAM

## 2023-02-05 PROCEDURE — 2500000003 HC RX 250 WO HCPCS: Performed by: STUDENT IN AN ORGANIZED HEALTH CARE EDUCATION/TRAINING PROGRAM

## 2023-02-05 PROCEDURE — 6360000002 HC RX W HCPCS: Performed by: NURSE PRACTITIONER

## 2023-02-05 PROCEDURE — 6370000000 HC RX 637 (ALT 250 FOR IP): Performed by: SURGERY

## 2023-02-05 PROCEDURE — 99232 SBSQ HOSP IP/OBS MODERATE 35: CPT | Performed by: INTERNAL MEDICINE

## 2023-02-05 PROCEDURE — 2100000000 HC CCU R&B

## 2023-02-05 PROCEDURE — 6370000000 HC RX 637 (ALT 250 FOR IP): Performed by: REGISTERED NURSE

## 2023-02-05 RX ORDER — LEVOTHYROXINE SODIUM 88 UG/1
88 TABLET ORAL DAILY
Status: DISCONTINUED | OUTPATIENT
Start: 2023-02-05 | End: 2023-02-07 | Stop reason: HOSPADM

## 2023-02-05 RX ORDER — MIRTAZAPINE 15 MG/1
30 TABLET, ORALLY DISINTEGRATING ORAL NIGHTLY
Status: DISCONTINUED | OUTPATIENT
Start: 2023-02-05 | End: 2023-02-06

## 2023-02-05 RX ORDER — LANOLIN ALCOHOL/MO/W.PET/CERES
6 CREAM (GRAM) TOPICAL NIGHTLY PRN
Status: DISCONTINUED | OUTPATIENT
Start: 2023-02-05 | End: 2023-02-07 | Stop reason: HOSPADM

## 2023-02-05 RX ORDER — LORAZEPAM 1 MG/1
1 TABLET ORAL 2 TIMES DAILY
Status: DISCONTINUED | OUTPATIENT
Start: 2023-02-05 | End: 2023-02-07 | Stop reason: HOSPADM

## 2023-02-05 RX ADMIN — METOPROLOL TARTRATE 25 MG: 25 TABLET, FILM COATED ORAL at 09:54

## 2023-02-05 RX ADMIN — METOPROLOL TARTRATE 25 MG: 25 TABLET, FILM COATED ORAL at 20:58

## 2023-02-05 RX ADMIN — Medication 10 MG: at 08:57

## 2023-02-05 RX ADMIN — AMLODIPINE BESYLATE 10 MG: 10 TABLET ORAL at 08:56

## 2023-02-05 RX ADMIN — AMIODARONE HYDROCHLORIDE 400 MG: 200 TABLET ORAL at 08:56

## 2023-02-05 RX ADMIN — Medication 6 MG: at 22:57

## 2023-02-05 RX ADMIN — Medication 10 MG: at 20:59

## 2023-02-05 RX ADMIN — IPRATROPIUM BROMIDE AND ALBUTEROL SULFATE 1 AMPULE: 2.5; .5 SOLUTION RESPIRATORY (INHALATION) at 11:55

## 2023-02-05 RX ADMIN — Medication 10 ML: at 06:14

## 2023-02-05 RX ADMIN — QUETIAPINE FUMARATE 25 MG: 25 TABLET ORAL at 02:13

## 2023-02-05 RX ADMIN — MOMETASONE FUROATE AND FORMOTEROL FUMARATE DIHYDRATE 2 PUFF: 200; 5 AEROSOL RESPIRATORY (INHALATION) at 08:49

## 2023-02-05 RX ADMIN — MIRTAZAPINE 30 MG: 15 TABLET, ORALLY DISINTEGRATING ORAL at 21:00

## 2023-02-05 RX ADMIN — GABAPENTIN 300 MG: 300 CAPSULE ORAL at 15:13

## 2023-02-05 RX ADMIN — LEVOTHYROXINE SODIUM 88 MCG: 0.09 TABLET ORAL at 09:54

## 2023-02-05 RX ADMIN — GABAPENTIN 300 MG: 300 CAPSULE ORAL at 20:58

## 2023-02-05 RX ADMIN — GABAPENTIN 300 MG: 300 CAPSULE ORAL at 08:58

## 2023-02-05 RX ADMIN — HYDROMORPHONE HYDROCHLORIDE 1 MG: 1 INJECTION, SOLUTION INTRAMUSCULAR; INTRAVENOUS; SUBCUTANEOUS at 20:58

## 2023-02-05 RX ADMIN — LORAZEPAM 1 MG: 1 TABLET ORAL at 22:57

## 2023-02-05 RX ADMIN — SODIUM CHLORIDE, PRESERVATIVE FREE 10 ML: 5 INJECTION INTRAVENOUS at 08:59

## 2023-02-05 RX ADMIN — IPRATROPIUM BROMIDE AND ALBUTEROL SULFATE 1 AMPULE: 2.5; .5 SOLUTION RESPIRATORY (INHALATION) at 19:40

## 2023-02-05 RX ADMIN — AMIODARONE HYDROCHLORIDE 400 MG: 200 TABLET ORAL at 20:58

## 2023-02-05 RX ADMIN — IPRATROPIUM BROMIDE AND ALBUTEROL SULFATE 1 AMPULE: 2.5; .5 SOLUTION RESPIRATORY (INHALATION) at 08:48

## 2023-02-05 RX ADMIN — SODIUM CHLORIDE, PRESERVATIVE FREE 10 ML: 5 INJECTION INTRAVENOUS at 20:59

## 2023-02-05 RX ADMIN — LORAZEPAM 0.5 MG: 0.5 TABLET ORAL at 08:57

## 2023-02-05 RX ADMIN — MOMETASONE FUROATE AND FORMOTEROL FUMARATE DIHYDRATE 2 PUFF: 200; 5 AEROSOL RESPIRATORY (INHALATION) at 19:40

## 2023-02-05 RX ADMIN — ESCITALOPRAM OXALATE 20 MG: 10 TABLET, FILM COATED ORAL at 08:56

## 2023-02-05 RX ADMIN — BISACODYL 10 MG: 10 SUPPOSITORY RECTAL at 08:56

## 2023-02-05 RX ADMIN — ENOXAPARIN SODIUM 30 MG: 100 INJECTION SUBCUTANEOUS at 08:58

## 2023-02-05 ASSESSMENT — PAIN SCALES - GENERAL
PAINLEVEL_OUTOF10: 8
PAINLEVEL_OUTOF10: 0

## 2023-02-05 ASSESSMENT — PAIN DESCRIPTION - ORIENTATION: ORIENTATION: MID;PROXIMAL

## 2023-02-05 ASSESSMENT — PAIN DESCRIPTION - PAIN TYPE: TYPE: SURGICAL PAIN

## 2023-02-05 ASSESSMENT — PAIN DESCRIPTION - DESCRIPTORS: DESCRIPTORS: SHARP

## 2023-02-05 ASSESSMENT — PAIN DESCRIPTION - LOCATION: LOCATION: ABDOMEN

## 2023-02-05 ASSESSMENT — PAIN - FUNCTIONAL ASSESSMENT: PAIN_FUNCTIONAL_ASSESSMENT: PREVENTS OR INTERFERES SOME ACTIVE ACTIVITIES AND ADLS

## 2023-02-05 NOTE — PROGRESS NOTES
His vitals had been stable, denied any pain, & had 2 small amount of incontinent, loose/ watery, brown BM overnight. He had been alert, oriented, & cooperative. Precedex drip was stopped @ 0612. He told this RN that he's feeling hungry.     Electronically signed by Manju Park RN on 2/5/2023 at 6:56 AM

## 2023-02-05 NOTE — PROGRESS NOTES
Bedside shift hand-off done w/ oncoming HITESH Giron., to assume pt. care. This RN handed-off the pt. in a stable condition.     Electronically signed by Liz Stratton RN on 2/5/2023 at 7:40 AM

## 2023-02-05 NOTE — PROGRESS NOTES
Pulmonary Progress Note    Date of Admission: 1/31/2023   LOS: 5 days     CC:No chief complaint on file.  Follow-up altered mental status        Assessment/Plan     Acute metabolic encephalopathy  -Resolved    Centrilobular emphysema  -Has have a history of smoking  -Mild wheezing today, will add on Dulera, has duo nebs    Tobacco abuse  -Says he is already bought NRT patches for home    No further inpatient recommendations, we will sign off at this time.  Please let us know if we can be of any further assistance.     HPI/Subjective  No acute events overnight.  Patient has no complaints.    ROS:   No nausea  No Vomiting  No chest pain      Intake/Output Summary (Last 24 hours) at 2/5/2023 0808  Last data filed at 2/5/2023 0645  Gross per 24 hour   Intake 2050.69 ml   Output 3130 ml   Net -1079.31 ml         PHYSICAL EXAM:   Blood pressure 120/73, pulse 63, temperature 97.4 °F (36.3 °C), temperature source Axillary, resp. rate 21, height 5' 9.5\" (1.765 m), weight 190 lb 4.1 oz (86.3 kg), SpO2 97 %.'  Gen:  No acute distress.   Eyes: PERRL. Anicteric sclera. No conjunctival injection.   ENT: No discharge. Posterior oropharynx clear. External appearance of ears and nose normal.  Neck: Trachea midline. No mass   Resp:  No crackles. No wheezes. No rhonchi. No dullness on percussion.  CV: Regular rate. Regular rhythm. No murmur or rub. No edema.   GI: Soft, Non-tender. Non-distended. +BS  Skin: Warm, dry, w/o erythema.   Lymph: No cervical or supraclavicular LAD.   M/S: No cyanosis. No clubbing.    Neuro:  no focal neurologic deficit.  Moves all extremities  Psych: Awake and alert, Oriented x 3.  Judgement and insight appropriate.  Mood stable.      Medications:    Scheduled Meds:   ipratropium-albuterol  1 ampule Inhalation Q4H WA    mometasone-formoterol  2 puff Inhalation BID    bisacodyl  10 mg Rectal Daily    LORazepam  0.5 mg Oral BID    escitalopram  20 mg Oral Daily    mirtazapine  30 mg Oral Nightly     metoprolol tartrate  25 mg Oral BID    gabapentin  300 mg Oral TID    amLODIPine  10 mg Oral Daily    amiodarone  400 mg Oral BID    Followed by    [facility] ON 2/10/2023] amiodarone  200 mg Oral Daily    enoxaparin Sodium  30 mg SubCUTAneous Daily    famotidine (PEPCID) injection  10 mg IntraVENous BID    Levothyroxine Sodium  50 mcg IntraVENous Daily    sodium chloride flush  5-40 mL IntraVENous 2 times per day    nicotine  1 patch TransDERmal Daily       Continuous Infusions:   sodium chloride Stopped (02/03/23 1510)    sodium chloride 30 mL/hr at 02/05/23 0638    dexmedetomidine Stopped (02/05/23 0612)    sodium chloride Stopped (02/02/23 1143)    phenylephrine (JAIME-SYNEPHRINE) 50 mg/250 mL infusion 10 mcg/min (02/01/23 0600)       PRN Meds:  QUEtiapine, polyvinyl alcohol-povidone, sodium chloride, ipratropium-albuterol, HYDROmorphone **OR** HYDROmorphone, metoprolol, hydrALAZINE, perflutren lipid microspheres, phenol, sodium chloride flush, sodium chloride, ondansetron **OR** ondansetron    Labs reviewed:  CBC:   Recent Labs     02/03/23  0404 02/04/23  0852   WBC 11.7* 9.6   HGB 7.5* 7.8*   HCT 23.2* 24.2*   MCV 91.1 91.5   PLT 86* 117*     BMP:   Recent Labs     02/03/23  0404 02/04/23  0852   * 148*   K 4.1 3.7   * 112*   CO2 22 24   BUN 28* 31*   CREATININE 1.3 1.2     LIVER PROFILE: No results for input(s): AST, ALT, LIPASE, BILIDIR, BILITOT, ALKPHOS in the last 72 hours. Invalid input(s): AMYLASE,  ALB  PT/INR: No results for input(s): PROTIME, INR in the last 72 hours. APTT: No results for input(s): APTT in the last 72 hours. UA:No results for input(s): NITRITE, COLORU, PHUR, LABCAST, WBCUA, RBCUA, MUCUS, TRICHOMONAS, YEAST, BACTERIA, CLARITYU, SPECGRAV, LEUKOCYTESUR, UROBILINOGEN, BILIRUBINUR, BLOODU, GLUCOSEU, AMORPHOUS in the last 72 hours. Invalid input(s): Nita Bran  No results for input(s): PH, PCO2, PO2 in the last 72 hours.       Films:  Radiology Review:  Pertinent images / reports were reviewed as a part of this visit. XR ABDOMEN (KUB) (SINGLE AP VIEW)  Narrative: EXAMINATION:  ONE SUPINE XRAY VIEW(S) OF THE ABDOMEN    2/3/2023 9:39 am    COMPARISON:  Prior CT examinations of the abdomen/pelvis most recent 11/19/2022. HISTORY:  ORDERING SYSTEM PROVIDED HISTORY: Evaluate for Ileus, S/P Open AAA Repair  TECHNOLOGIST PROVIDED HISTORY:  Reason for exam:->Evaluate for Ileus, S/P Open AAA Repair  Reason for Exam: Evaluate for Ileus, S/P Open AAA Repair    FINDINGS:  There is gaseous distention of small and large bowel. There is mild-moderate  distention of multiple small bowel loops in the mid/lower abdomen and pelvis. Findings may be on the basis of postoperative ileus in this patient status  post repair of abdominal aortic aneurysm. Multiple left paramidline surgical  clips are identified. Multiple skin staples are identified about the  abdomen/pelvis. Gao catheter is in place. Visualized portions of the lung  bases are clear. Impression: Gaseous distention of small and large bowel may be on the basis of  postoperative ileus as described above. Short-term follow-up examination  recommended if clinically indicated. Access  CVC   Arterial          PICC             CVC        CVC Triple Lumen 01/31/23 (Active)   Central Line Being Utilized Yes 02/05/23 0641   Criteria for Appropriate Use Hemodynamically unstable, requiring monitoring lines, vasopressors, or volume resuscitation 02/05/23 0641   Site Assessment Dry; Intact 02/05/23 0641   Phlebitis Assessment No symptoms 02/05/23 0641   Infiltration Assessment 0 02/05/23 0641   Color/Movement/Sensation Capillary refill less than 3 sec 02/05/23 0641   Proximal Lumen Color/Status Normal saline locked; Alcohol cap applied 02/05/23 0641   Medial Lumen Status Brisk blood return;Flushed;Normal saline locked; Alcohol cap applied 02/05/23 0641   Distal Lumen Color/Status Infusing 02/05/23 0641   Line Care Connections checked and tightened 02/05/23 0641   Alcohol Cap Used Yes 02/05/23 0641   Date of Last Dressing Change 01/31/23 02/05/23 0641   Dressing Type Transparent; Antimicrobial 02/05/23 0641   Dressing Status Dry; Intact; Old drainage noted 02/05/23 0641   Number of days: 4            Sima           Thank you for this consult,    Ailyn Varela MD  [facility] Pulmonary, Critical Care, and Sleep Medicine

## 2023-02-05 NOTE — PROGRESS NOTES
Late entry due to patient care. Bedside shift hand-off done w/ off-going RN Kamille SUNG Pt. is alert and oriented, calm, SR on the monitor, on room air, not in any distress, w/ intact R IJ CVC, and w/ intact mid-abdominal dressing w/ JESSICA NPWT (w/ flashing green OK indicator). He's on Precedex drip @ 0.2 mcg/kg/hr and NS @ 30 ml/hr. Call light and bedside table are within his reach. Bed alarm is activated.     Electronically signed by Apolonia Beck RN on 2/5/2023 at 3:00 AM

## 2023-02-05 NOTE — PROGRESS NOTES
4 Eyes Skin Assessment     NAME:  Remington Bob  YOB: 1945  MEDICAL RECORD NUMBER:  [id number]    The patient is being assessed for  Shift Handoff    I agree that One RN have performed a thorough Head to Toe Skin Assessment on the patient. ALL assessment sites listed below have been assessed. Areas assessed by both nurses:    Head, Face, Ears, Shoulders, Back, Chest, Arms, Elbows, Hands, Sacrum. Buttock, Coccyx, Ischium, and Legs. Feet and Heels        Does the Patient have a Wound? Yes wound(s) were present on assessment.  LDA wound assessment was Initiated and completed by RN (surgical wound; no pressure injury)       Kristian Prevention initiated by RN: Yes   Wound Care Orders initiated by RN: NA    Pressure Injury (Stage 3,4, Unstageable, DTI, NWPT, and Complex wounds) if present place referral order by RN under : NA    New and Established Ostomies, if present place, referral order under : NA      Nurse 1 eSignature: Electronically signed by Estrada Smart RN on 2/5/23 at 7:40 AM EST    **SHARE this note so that the co-signing nurse is able to place an eSignature**    Nurse 2 eSignature: Electronically signed by Korey Urena RN on 2/5/23 at 7: 39 AM EST

## 2023-02-05 NOTE — PROGRESS NOTES
Vascular    POD #5  No nausea  + flatus    AFVSS    Intake/Output Summary (Last 24 hours) at 2/5/2023 0928  Last data filed at 2/5/2023 0645  Gross per 24 hour   Intake 2050.69 ml   Output 2730 ml   Net -679.31 ml     Abd soft, NT  Ext warm with palpable distal pulses    Lab Results   Component Value Date    WBC 9.6 02/04/2023    HGB 7.8 (L) 02/04/2023    HCT 24.2 (L) 02/04/2023    MCV 91.5 02/04/2023     (L) 02/04/2023     I/P:  POD #5  Advance to clear liquid diet  Home meds  Activity as tolerated      Jos Mathew M.D., FACS.  2/5/2023  9:28 AM

## 2023-02-05 NOTE — PLAN OF CARE
Problem: Confusion  Goal: Confusion, delirium, dementia, or psychosis is improved or at baseline  Outcome: Progressing  Effect of thought disturbance (confusion, delirium, dementia, or psychosis) are managed with adequate functional status:   Assess for contributors to thought disturbance, including medications, impaired vision or hearing, underlying metabolic abnormalities, dehydration, psychiatric diagnoses, notify Scott Jordan high risk fall precautions, as indicated   Provide frequent short contacts to provide reality reorientation, refocusing and direction   Decrease environmental stimuli, including noise as appropriate   Monitor and intervene to maintain adequate nutrition, hydration, elimination, sleep and activity   If unable to ensure safety without constant attention obtain sitter and review sitter guidelines with assigned personnel     Problem: Neurosensory - Adult  Goal: Achieves stable or improved neurological status  Outcome: Progressing  Achieves stable or improved neurological status: Assess for and report changes in neurological status     Problem: Pain  Goal: Verbalizes/displays adequate comfort level or baseline comfort level  Outcome: Progressing  Verbalizes/displays adequate comfort level or baseline comfort level:   Encourage patient to monitor pain and request assistance   Assess pain using appropriate pain scale   Administer analgesics based on type and severity of pain and evaluate response   Implement non-pharmacological measures as appropriate and evaluate response   Notify Licensed Independent Practitioner if interventions unsuccessful or patient reports new pain     Problem: Respiratory - Adult  Goal: Achieves optimal ventilation and oxygenation  Outcome: Progressing  Achieves optimal ventilation and oxygenation:   Assess for changes in respiratory status   Assess for changes in mentation and behavior   Position to facilitate oxygenation and minimize respiratory effort   Oxygen supplementation based on oxygen saturation or arterial blood gases   Encourage broncho-pulmonary hygiene including cough, deep breathe, incentive spirometry   Assess and instruct to report shortness of breath or any respiratory difficulty   Respiratory therapy support as indicated     Problem: Cardiovascular - Adult  Goal: Maintains optimal cardiac output and hemodynamic stability  Outcome: Progressing  Maintains optimal cardiac output and hemodynamic stability:   Monitor blood pressure and heart rate   Monitor urine output and notify Licensed Independent Practitioner for values outside of normal range   Assess for signs of decreased cardiac output     Problem: Infection - Adult  Goal: Absence of infection at discharge  Outcome: Progressing  Absence of infection at discharge:   Assess and monitor for signs and symptoms of infection   Monitor lab/diagnostic results   Monitor all insertion sites i.e., indwelling lines, tubes and drains   Instruct and encourage patient and family to use good hand hygiene technique     Problem: Infection - Adult  Goal: Absence of infection during hospitalization  Outcome: Progressing  Absence of infection during hospitalization:   Assess and monitor for signs and symptoms of infection   Monitor lab/diagnostic results   Monitor all insertion sites i.e., indwelling lines, tubes and drains   Instruct and encourage patient and family to use good hand hygiene technique     Problem: Genitourinary - Adult  Goal: Absence of urinary retention  Outcome: Progressing  Absence of urinary retention:   Assess patients ability to void and empty bladder   Monitor intake/output and perform bladder scan as needed     Problem: Skin/Tissue Integrity - Adult  Goal: Skin integrity remains intact  Outcome: Progressing  Skin Integrity Remains Intact: Monitor for areas of redness and/or skin breakdown     Problem: Skin/Tissue Integrity - Adult  Goal: Incisions, wounds, or drain sites healing without S/S of infection  Outcome: Progressing  Incisions, Wounds, or Drain Sites Healing Without Sign and Symptoms of Infection:   TWICE DAILY: Assess and document skin integrity   TWICE DAILY: Assess and document dressing/incision, wound bed, drain sites and surrounding tissue   Initiate pressure ulcer prevention bundle as indicated     Problem: ABCDS Injury Assessment  Goal: Absence of physical injury  Outcome: Progressing  Absence of Physical Injury: Implement safety measures based on patient assessment     Electronically signed by Ailyn Escudero RN on 2/5/2023 at 3:45 AM

## 2023-02-05 NOTE — PROGRESS NOTES
Late entry due to patient care. @ 2050 to 2120 - Shift assessment completed. He denies any pain. He has warm extremities and palpable pulses. He denies any nausea and had been tolerating ice chips, sips of H2O w/ medications and popsicles. He had been having incontinent watery BMs since earlier this afternoon and been passing flatus. Plan of care for this shift was explained to him and he verbalized understanding.     Electronically signed by Carlos Treviño RN on 2/5/2023 at 3:14 AM

## 2023-02-06 PROCEDURE — 2100000000 HC CCU R&B

## 2023-02-06 PROCEDURE — 6370000000 HC RX 637 (ALT 250 FOR IP): Performed by: REGISTERED NURSE

## 2023-02-06 PROCEDURE — APPNB30 APP NON BILLABLE TIME 0-30 MINS: Performed by: NURSE PRACTITIONER

## 2023-02-06 PROCEDURE — 2500000003 HC RX 250 WO HCPCS: Performed by: STUDENT IN AN ORGANIZED HEALTH CARE EDUCATION/TRAINING PROGRAM

## 2023-02-06 PROCEDURE — 97530 THERAPEUTIC ACTIVITIES: CPT

## 2023-02-06 PROCEDURE — 97166 OT EVAL MOD COMPLEX 45 MIN: CPT

## 2023-02-06 PROCEDURE — 6370000000 HC RX 637 (ALT 250 FOR IP): Performed by: STUDENT IN AN ORGANIZED HEALTH CARE EDUCATION/TRAINING PROGRAM

## 2023-02-06 PROCEDURE — 6360000002 HC RX W HCPCS: Performed by: STUDENT IN AN ORGANIZED HEALTH CARE EDUCATION/TRAINING PROGRAM

## 2023-02-06 PROCEDURE — 94640 AIRWAY INHALATION TREATMENT: CPT

## 2023-02-06 PROCEDURE — 97162 PT EVAL MOD COMPLEX 30 MIN: CPT

## 2023-02-06 PROCEDURE — 2580000003 HC RX 258: Performed by: STUDENT IN AN ORGANIZED HEALTH CARE EDUCATION/TRAINING PROGRAM

## 2023-02-06 PROCEDURE — 6370000000 HC RX 637 (ALT 250 FOR IP): Performed by: NURSE PRACTITIONER

## 2023-02-06 PROCEDURE — 6370000000 HC RX 637 (ALT 250 FOR IP): Performed by: INTERNAL MEDICINE

## 2023-02-06 PROCEDURE — 94760 N-INVAS EAR/PLS OXIMETRY 1: CPT

## 2023-02-06 PROCEDURE — 6370000000 HC RX 637 (ALT 250 FOR IP): Performed by: SURGERY

## 2023-02-06 RX ORDER — ASPIRIN 81 MG/1
81 TABLET, CHEWABLE ORAL DAILY
Status: DISCONTINUED | OUTPATIENT
Start: 2023-02-06 | End: 2023-02-07 | Stop reason: HOSPADM

## 2023-02-06 RX ORDER — ATORVASTATIN CALCIUM 10 MG/1
10 TABLET, FILM COATED ORAL NIGHTLY
Status: DISCONTINUED | OUTPATIENT
Start: 2023-02-06 | End: 2023-02-07 | Stop reason: HOSPADM

## 2023-02-06 RX ORDER — OXYCODONE HYDROCHLORIDE 10 MG/1
10 TABLET ORAL EVERY 4 HOURS PRN
Status: DISCONTINUED | OUTPATIENT
Start: 2023-02-06 | End: 2023-02-07 | Stop reason: HOSPADM

## 2023-02-06 RX ORDER — MIRTAZAPINE 15 MG/1
45 TABLET, ORALLY DISINTEGRATING ORAL NIGHTLY
Status: DISCONTINUED | OUTPATIENT
Start: 2023-02-06 | End: 2023-02-07 | Stop reason: HOSPADM

## 2023-02-06 RX ORDER — OXYCODONE HYDROCHLORIDE 5 MG/1
5 TABLET ORAL EVERY 4 HOURS PRN
Status: DISCONTINUED | OUTPATIENT
Start: 2023-02-06 | End: 2023-02-07 | Stop reason: HOSPADM

## 2023-02-06 RX ADMIN — AMIODARONE HYDROCHLORIDE 400 MG: 200 TABLET ORAL at 22:46

## 2023-02-06 RX ADMIN — IPRATROPIUM BROMIDE AND ALBUTEROL SULFATE 1 AMPULE: 2.5; .5 SOLUTION RESPIRATORY (INHALATION) at 20:09

## 2023-02-06 RX ADMIN — AMIODARONE HYDROCHLORIDE 400 MG: 200 TABLET ORAL at 08:58

## 2023-02-06 RX ADMIN — IPRATROPIUM BROMIDE AND ALBUTEROL SULFATE 1 AMPULE: 2.5; .5 SOLUTION RESPIRATORY (INHALATION) at 07:34

## 2023-02-06 RX ADMIN — MOMETASONE FUROATE AND FORMOTEROL FUMARATE DIHYDRATE 2 PUFF: 200; 5 AEROSOL RESPIRATORY (INHALATION) at 07:34

## 2023-02-06 RX ADMIN — SODIUM CHLORIDE, PRESERVATIVE FREE 10 ML: 5 INJECTION INTRAVENOUS at 22:50

## 2023-02-06 RX ADMIN — MOMETASONE FUROATE AND FORMOTEROL FUMARATE DIHYDRATE 2 PUFF: 200; 5 AEROSOL RESPIRATORY (INHALATION) at 20:09

## 2023-02-06 RX ADMIN — GABAPENTIN 300 MG: 300 CAPSULE ORAL at 08:57

## 2023-02-06 RX ADMIN — MIRTAZAPINE 45 MG: 15 TABLET, ORALLY DISINTEGRATING ORAL at 22:47

## 2023-02-06 RX ADMIN — Medication 10 MG: at 22:47

## 2023-02-06 RX ADMIN — Medication 6 MG: at 22:46

## 2023-02-06 RX ADMIN — IPRATROPIUM BROMIDE AND ALBUTEROL SULFATE 1 AMPULE: 2.5; .5 SOLUTION RESPIRATORY (INHALATION) at 15:14

## 2023-02-06 RX ADMIN — SODIUM CHLORIDE, PRESERVATIVE FREE 10 ML: 5 INJECTION INTRAVENOUS at 10:40

## 2023-02-06 RX ADMIN — Medication 320 MG: at 17:25

## 2023-02-06 RX ADMIN — Medication 10 MG: at 08:58

## 2023-02-06 RX ADMIN — ASPIRIN 81 MG 81 MG: 81 TABLET ORAL at 10:43

## 2023-02-06 RX ADMIN — METOPROLOL TARTRATE 25 MG: 25 TABLET, FILM COATED ORAL at 10:40

## 2023-02-06 RX ADMIN — LORAZEPAM 1 MG: 1 TABLET ORAL at 22:47

## 2023-02-06 RX ADMIN — METOPROLOL TARTRATE 25 MG: 25 TABLET, FILM COATED ORAL at 22:46

## 2023-02-06 RX ADMIN — ATORVASTATIN CALCIUM 10 MG: 10 TABLET, FILM COATED ORAL at 22:46

## 2023-02-06 RX ADMIN — LORAZEPAM 1 MG: 1 TABLET ORAL at 08:57

## 2023-02-06 RX ADMIN — ESCITALOPRAM OXALATE 20 MG: 10 TABLET, FILM COATED ORAL at 08:58

## 2023-02-06 RX ADMIN — ENOXAPARIN SODIUM 30 MG: 100 INJECTION SUBCUTANEOUS at 08:58

## 2023-02-06 RX ADMIN — LEVOTHYROXINE SODIUM 88 MCG: 0.09 TABLET ORAL at 05:32

## 2023-02-06 RX ADMIN — AMLODIPINE BESYLATE 10 MG: 10 TABLET ORAL at 08:57

## 2023-02-06 RX ADMIN — IPRATROPIUM BROMIDE AND ALBUTEROL SULFATE 1 AMPULE: 2.5; .5 SOLUTION RESPIRATORY (INHALATION) at 11:21

## 2023-02-06 RX ADMIN — GABAPENTIN 300 MG: 300 CAPSULE ORAL at 22:46

## 2023-02-06 RX ADMIN — GABAPENTIN 300 MG: 300 CAPSULE ORAL at 14:15

## 2023-02-06 ASSESSMENT — PAIN SCALES - GENERAL: PAINLEVEL_OUTOF10: 0

## 2023-02-06 NOTE — CARE COORDINATION
02/06/23 1640   IMM Letter   IMM Letter given to Patient/Family/Significant other/Guardian/POA/by: presented to patient by rianna Clay who verbalized understanding.    IMM Letter date given: 02/06/23   IMM Letter time given: 1640

## 2023-02-06 NOTE — PROGRESS NOTES
Bedside shift hand-off done w/ oncoming HITESH Cazares, to assume pt. care. This RN handed-off the pt. in a stable condition.     Electronically signed by Loy Kaplan RN on 2/6/2023 at 7:21 AM

## 2023-02-06 NOTE — PROGRESS NOTES
4 Eyes Skin Assessment     NAME:  Andriy Contreras  YOB: 1945  MEDICAL RECORD NUMBER:  [id number]    The patient is being assessed for  Shift Handoff    I agree that One RN have performed a thorough Head to Toe Skin Assessment on the patient. ALL assessment sites listed below have been assessed. Areas assessed by both nurses:    Head, Face, Ears, Shoulders, Back, Chest, Arms, Elbows, Hands, Sacrum. Buttock, Coccyx, Ischium, and Legs. Feet and Heels        Does the Patient have a Wound?  No noted wound(s) (only surgical wound; no pressure injury)       Kristian Prevention initiated by RN: NA   Wound Care Orders initiated by RN: NA    Pressure Injury (Stage 3,4, Unstageable, DTI, NWPT, and Complex wounds) if present place referral order by RN under : NA    New and Established Ostomies, if present place, referral order under : NA      Nurse 1 eSignature: Electronically signed by Anay Newell RN on 2/6/23 at 7:22 AM EST    **SHARE this note so that the co-signing nurse is able to place an eSignature**    Nurse 2 eSignature: {Esignature:[id number]}

## 2023-02-06 NOTE — CARE COORDINATION
Met with patient to discuss dc planning. He is very pleased with his stay here but he reports he is going home tomorrow. Therapy is NOT recommending home care services at this time. IMM letter presented. Goal is home.   Angelique Lawson     Case Management   [phone]    2/6/2023  4:41 PM

## 2023-02-06 NOTE — ACP (ADVANCE CARE PLANNING)
Advance Care Planning   Advance Care Planning Note  Ambulatory Spiritual Care Services    Date:  12/16/2022    Received request from patient and family. Consultation conversation participants:   Patient who understands ACP conversation  Spouse     Goals of ACP Conversation:  Discuss advance care planning documents  Facilitate a discussion related to patient's goals of care as they align with the patient's values and beliefs. Health Care Decision Makers:      Primary Decision Maker: Nisha Kaminski - [phone]    Secondary Decision Maker: Naeem Pike [facility] - [phone]   Summary:  Completed New Documents  Verified Healthcare Decision [facility]    Advance Care Planning Documents (Patient Wishes)  Currently on file:   Healthcare Power of /Advance Directive Appointment of Health Care Agent  Living Will/Advance Directive    Assessment:   Pt is alert and oriented. Pt stated he had desired to get this done a long time ago.  facilitated completion of the HCPOA  and LW documents as per pt wishes. Interventions:  Provided education on documents for clarity and greater understanding  Discussed and provided education on state decision maker hierarchy  Assisted in the completion of documents according to patient's wishes at this time  Encouraged ongoing ACP conversation with future decision makers and loved ones    Care Preferences Communicated:   No    Outcomes:  ACP Discussion: Completed  New advance directive completed. Returned original document(s) to patient, as well as copies for distribution to appointed agents  Copy of advance directive given to staff to scan into medical record. Teach Back Method used to verify the patient's and/or Healthcare Decision Maker's understanding of key information in the advance directive documents    Patient / Healthcare Decision Maker Instructions:   Follow up with their individual provider to further discussion of health status    Electronically signed by Chaplain China on 2/6/2023 at 2:37 PM.

## 2023-02-06 NOTE — PROGRESS NOTES
Throughout the night he had been alert, oriented (x4) and cooperative. His vitals had been stable and he just needed to be given his PRN Dilaudid (surgical abdominal pain due to activity and coughing) one-time last night. He has warm and dry skin and palpable pulses. He didn't have any BM tonight but last BM was yesterday at noon time. He had been tolerating clear liquids.     Electronically signed by Estrada Smart RN on 2/6/2023 at 6:04 AM

## 2023-02-06 NOTE — PROGRESS NOTES
Patient tolerated liquid diet today. Denied nausea and no episodes of emesis. Bowel sounds active. Denied pain. Patient ambulated bennett with walker and standby assistance from this RN and returned to chair. Patient transferred from chair to bed with this RN and Eduar Munguia RN, during bedside handoff. Patient handed off in stable condition.

## 2023-02-06 NOTE — PROGRESS NOTES
Occupational Therapy  Facility/Department: [facility]  Occupational Therapy Initial Assessment    Name: Ronit Wilson  : 1945  MRN: [id number]  Date of Service: 2023    Discharge Recommendations:  Home with assist PRN  OT Equipment Recommendations  Equipment Needed: No     Ronit Wilson scored a 20/24 on the AM-PAC ADL Inpatient form. At this time, no further OT is recommended upon discharge. Recommend patient returns to prior setting with prior services. Patient Diagnosis(es): The encounter diagnosis was Infrarenal abdominal aortic aneurysm (AAA) without rupture. Past Medical History:  has a past medical history of AAA (abdominal aortic aneurysm), Anxiety, Depression, Hyperlipidemia, Hypertension, Hypothyroidism, and Iliac artery stenosis, left ([address] Past Surgical History:  has a past surgical history that includes lumbar laminectomy; Colonoscopy (2014); other surgical history (2015); Anus surgery (N/A, 2021); and Abdominal aortic aneurysm repair (N/A, 2023). Assessment   Assessment: 67 y/o male admitted 2023 s/p AAA repair. PTA pt lives at home with spouse and was independent with ADLs and functional mobility. Today, pt required supervision for ADLs and SBA for transfers/mobility around room/bennett with RW. Pt with functional UE ROM for self care and anticipate will be SBA for ADLs. Pt is functioning close to baseline and safe to return home at discharge. No further OT warranted. Prognosis: Good  Decision Making: Medium Complexity  No Skilled OT: Safe to return home; No OT goals identified  REQUIRES OT FOLLOW-UP: No  Activity Tolerance  Activity Tolerance: Patient Tolerated treatment well        Plan   Occupational Therapy Plan  Times Per Week: DC acute OT     Restrictions  Restrictions/Precautions  Restrictions/Precautions: Up as Tolerated  Position Activity Restriction  Other position/activity restrictions: JESSICA drain; abdominal lesley    Subjective   General  Chart Reviewed: Yes  Patient assessed for rehabilitation services?: Yes  Additional Pertinent Hx: 67 y/o male admitted 1/31/2023 s/p Open AAA Repair. Family / Caregiver Present: No  Referring Practitioner: DEDRA Genao CNP  Subjective  Subjective: Pt seen bedside and agreeable to therapy. General Comment  Comments: Per RN ok for therapy     Social/Functional History  Social/Functional History  Lives With: Spouse  Type of Home: Condo  Home Layout: One level  Home Access: Level entry  Bathroom Shower/Tub: Tub/Shower unit, Walk-in shower (primarily uses walk in shower)  Bathroom Toilet: Standard  Bathroom Equipment: Grab bars in shower, Shower chair  Home Equipment: [address]  Has the patient had two or more falls in the past year or any fall with injury in the past year?: No  ADL Assistance: Independent  Homemaking Assistance: Independent  Ambulation Assistance: Independent (no AD)  Transfer Assistance: Independent  Active : Yes  Occupation: Retired  Additional Comments: Pt reports spouse available to assist as needed at discharge       Objective   Safety Devices  Type of Devices: Call light within reach;Nurse notified; Left in chair;Chair alarm in place           ADL  LE Dressing:  (pt able to cross LE over knee to annie slippers prior to ambulation)  Additional Comments: Pt declined further ADLs. Anticipate pt will be supervision for ADls based on balance and endurance observed        Bed mobility  Supine to Sit: Supervision  Sit to Supine:  (pt in chair at end of session)    Transfers  Sit to stand: Supervision  Stand to sit: Supervision  Transfer Comments: pt ambulated around room and in bennett with RW and SBA/supervision. Pt ambulated short distance without AD and SBA.     Vision  Vision: Impaired  Vision Exceptions: Wears glasses for reading  Hearing  Hearing: Within functional limits    Cognition  Overall Cognitive Status: [facility]  Orientation  Overall Orientation Status: Within Functional Limits  Perception  Overall Perceptual Status: WFL               Education Given To: Patient  Education Provided: Role of Therapy;Plan of Care;Transfer Training  Education Method: Demonstration;Verbal  Barriers to Learning: None  Education Outcome: Verbalized understanding;Demonstrated understanding    LUE AROM (degrees)  LUE AROM : WFL  Left Hand AROM (degrees)  Left Hand AROM: WFL  RUE AROM (degrees)  RUE AROM : WFL  Right Hand AROM (degrees)  Right Hand AROM: [facility]       AM-PAC Score  AM-PAC Inpatient Daily Activity Raw Score: 20 (02/06/23 1349)  AM-PAC Inpatient ADL T-Scale Score : 42.03 (02/06/23 1349)  ADL Inpatient CMS 0-100% Score: 38.32 (02/06/23 1349)  ADL Inpatient CMS G-Code Modifier : CJ (02/06/23 1349)    Goals  Short Term Goals  Time Frame for Short Term Goals: No acute OT goals identified  Patient Goals   Patient goals : to return home       Therapy Time   Individual Concurrent Group Co-treatment   Time In 1310         Time Out 1340         Minutes 30         Timed Code Treatment Minutes: 15 Minutes     This note to serve as OT d/c summary if pt is d/c-ed prior to next therapy session.     Radhika Nam OTR/L

## 2023-02-06 NOTE — PROGRESS NOTES
4 Eyes Skin Assessment     NAME:  Gela Syed  YOB: 1945  MEDICAL RECORD NUMBER:  [id number]    The patient is being assessed for  Shift Handoff    I agree that One RN have performed a thorough Head to Toe Skin Assessment on the patient. ALL assessment sites listed below have been assessed. Areas assessed by both nurses:    Head, Face, Ears, Shoulders, Back, Chest, Arms, Elbows, Hands, Sacrum. Buttock, Coccyx, Ischium, and Legs. Feet and Heels        Does the Patient have a Wound?  No noted wound(s) (just surgical wound, no pressure injury)       Kristian Prevention initiated by RN: Yes   Wound Care Orders initiated by RN: NA    Pressure Injury (Stage 3,4, Unstageable, DTI, NWPT, and Complex wounds) if present place referral order by RN under : NA    New and Established Ostomies, if present place, referral order under : NA      Nurse 1 eSignature: Electronically signed by Pradip Coburn RN on 2/5/23 at 7:08 PM EST    **SHARE this note so that the co-signing nurse is able to place an eSignature**    Nurse 2 eSignature: Electronically signed by Lemar Peabody, RN on 2/5/23 at 8:01 PM EST

## 2023-02-06 NOTE — PROGRESS NOTES
Physical Therapy  Facility/Department: [address]  Physical Therapy Initial Assessment    Name: Sophia Gaytan  : 1945  MRN: [id number]  Date of Service: 2023    Discharge Recommendations:  Continue to assess pending progress, Home with assist PRN   PT Equipment Recommendations  Other: Will monitor for potential equipt needs. Sophia Gaytan scored a 19/24 on the AM-PAC short mobility form. At this time, no further PT is recommended upon discharge. Assessment   Body Structures, Functions, Activity Limitations Requiring Skilled Therapeutic Intervention: Decreased functional mobility ; Decreased endurance  Assessment: 69 y/o male admit 2023 with AAA, PAF.  2023 S/P Open Repair AAA. Post-op onset Confusion/Combativeness (improved this date). PMH as noted including AAA, Pulm Emphysema, HTN. PTA pt living in [facility] with level entry/access; independent daily care and functional mobility. Currently, Pt isabella oob, amb with/without assist device. No specific LOB noted; abit less guarded/improved sandrita with use of Walker at this time. At this time, anticipate adequate progress for d/c home. Do not anticipate need cont PT upon d/c. Will monitor pt's progress. Therapy Prognosis: Good  Decision Making: Medium Complexity  History: 69 y/o male admit 2023 with AAA, PAF.  2023 S/P Open Repair AAA. Post-op onset Confusion/Combativeness (improved this date). PMH as noted including AAA, Pulm Emphysema, HTN  Exam: See above. Clinical Presentation: See above. Barriers to Learning: None. Requires PT Follow-Up: Yes  Activity Tolerance  Activity Tolerance: Patient tolerated treatment well  Activity Tolerance Comments: Pt isabella oob, amb with/without assist device. No specific LOB noted; abit less guarded/improved sandrita with use of Walker at this time.      Plan   Physcial Therapy Plan  General Plan:  (1-2 further PT Rxs.)  Current Treatment Recommendations: Functional mobility training, Transfer training, Gait training, Safety education & training, Patient/Caregiver education & training  Safety Devices  Type of Devices: Call light within reach, Chair alarm in place, Left in chair, Nurse notified     Restrictions  Restrictions/Precautions  Restrictions/Precautions: Up as Tolerated  Position Activity Restriction  Other position/activity restrictions: Abd JESSICA Drain. Abd Binder. Subjective   General  Chart Reviewed: Yes  Patient assessed for rehabilitation services?: Yes  Additional Pertinent Hx: 69 y/o male admit 1/31/2023 with AAA, PAF.  1/31/2023 S/P Open Repair AAA. Post-op onset Confusion/Combativeness (improved this date). PMH as noted including AAA, Pulm Emphysema, HTN  Family / Caregiver Present: No  Referring Practitioner: P Scheurer  Follows Commands: Within Functional Limits  Subjective  Subjective: Pt agreeable to PT Eval/Rx. Social/Functional History  Social/Functional History  Lives With: Spouse  Type of Home: Condo  Home Layout: One level  Home Access: Level entry  Bathroom Shower/Tub: Tub/Shower unit, Walk-in shower (Pt uses walk in shower)  Bathroom Toilet: Standard  Bathroom Equipment: Grab bars in shower, Shower chair  Bathroom Accessibility: Accessible  Home Equipment: [facility], Crutches  Has the patient had two or more falls in the past year or any fall with injury in the past year?: No  ADL Assistance: Independent  Homemaking Assistance: Independent  Ambulation Assistance: Independent (Without assist device pta.)  Transfer Assistance: Independent  Active : Yes  Occupation: Retired  Additional Comments: Pt reports spouse available to assist as needed at discharge.   Vision/Hearing  Vision  Vision: Impaired  Vision Exceptions: Wears glasses for reading  Hearing  Hearing: Within functional limits    Cognition   Orientation  Overall Orientation Status: Within Functional Limits  Cognition  Overall Cognitive Status: WFL     Objective           Gross Assessment  AROM: Within functional limits  Strength: Generally decreased, functional                    Bed mobility  Supine to Sit: Supervision  Transfers  Sit to Stand: Supervision  Stand to Sit: Supervision  Ambulation  Surface: Level tile  Distance: Pt amb within hospital room setting and additional 76' with Walker SBA/Supervision; 25' without assist device SBA. No LE buckling/giving way; abit slower/more guarded without assist device. No specific LOB. AM-PAC Score  AM-PAC Inpatient Mobility Raw Score : 19 (02/06/23 1450)  AM-PAC Inpatient T-Scale Score : 45.44 (02/06/23 1450)  Mobility Inpatient CMS 0-100% Score: 41.77 (02/06/23 1450)  Mobility Inpatient CMS G-Code Modifier : CK (02/06/23 1450)            Goals  Short Term Goals  Time Frame for Short Term Goals: Upon d/c acute care setting. Short Term Goal 1: Bed Mob Independent. Short Term Goal 2: Transfers with/without assist device Independent. Short Term Goal 3: Amb with/without assist device 150' Supervision. Patient Goals   Patient Goals : Return home with wife. Education  Patient Education  Education Given To: Patient  Education Provided Comments: Role of PT, POC, Need to call for assist, Safe use of Walker.   Education Method: Verbal;Demonstration  Education Outcome: Continued education needed      Therapy Time   Individual Concurrent Group Co-treatment   Time In [address] [place]         Time Out [address] [facility] [address] Jayjay Montoya PT

## 2023-02-06 NOTE — PLAN OF CARE
Problem: Confusion  Goal: Confusion, delirium, dementia, or psychosis is improved or at baseline  Outcome: Progressing  Effect of thought disturbance (confusion, delirium, dementia, or psychosis) are managed with adequate functional status:   Assess for contributors to thought disturbance, including medications, impaired vision or hearing, underlying metabolic abnormalities, dehydration, psychiatric diagnoses, notify [place] high risk fall precautions, as indicated     Problem: Neurosensory - Adult  Goal: Achieves stable or improved neurological status  Outcome: Progressing  Achieves stable or improved neurological status: Assess for and report changes in neurological status     Problem: Pain  Goal: Verbalizes/displays adequate comfort level or baseline comfort level  Outcome: Progressing  Verbalizes/displays adequate comfort level or baseline comfort level:   Encourage patient to monitor pain and request assistance   Assess pain using appropriate pain scale   Administer analgesics based on type and severity of pain and evaluate response   Implement non-pharmacological measures as appropriate and evaluate response   Notify Licensed Independent Practitioner if interventions unsuccessful or patient reports new pain     Problem: Respiratory - Adult  Goal: Achieves optimal ventilation and oxygenation  Outcome: Progressing  Achieves optimal ventilation and oxygenation:   Assess for changes in respiratory status   Assess for changes in mentation and behavior   Position to facilitate oxygenation and minimize respiratory effort   Oxygen supplementation based on oxygen saturation or arterial blood gases   Initiate smoking cessation protocol as indicated   Encourage broncho-pulmonary hygiene including cough, deep breathe, incentive spirometry   Assess and instruct to report shortness of breath or any respiratory difficulty   Respiratory therapy support as indicated     Problem: Cardiovascular - Adult  Goal: Maintains optimal cardiac output and hemodynamic stability  Outcome: Progressing  Maintains optimal cardiac output and hemodynamic stability:   Monitor blood pressure and heart rate   Monitor urine output and notify Licensed Independent Practitioner for values outside of normal range   Assess for signs of decreased cardiac output     Problem: Genitourinary - Adult  Goal: Absence of urinary retention  Outcome: Progressing  Absence of urinary retention:   Assess patients ability to void and empty bladder   Monitor intake/output and perform bladder scan as needed     Problem: Infection - Adult  Goal: Absence of infection at discharge  Outcome: Progressing  Absence of infection at discharge:   Assess and monitor for signs and symptoms of infection   Monitor lab/diagnostic results   Monitor all insertion sites i.e., indwelling lines, tubes and drains   Instruct and encourage patient and family to use good hand hygiene technique     Problem: Infection - Adult  Goal: Absence of infection during hospitalization  Outcome: Progressing  Absence of infection during hospitalization:   Assess and monitor for signs and symptoms of infection   Monitor lab/diagnostic results   Monitor all insertion sites i.e., indwelling lines, tubes and drains   Instruct and encourage patient and family to use good hand hygiene technique     Problem: Skin/Tissue Integrity - Adult  Goal: Skin integrity remains intact  Outcome: Progressing  Skin Integrity Remains Intact: Monitor for areas of redness and/or skin breakdown     Problem: Skin/Tissue Integrity - Adult  Goal: Incisions, wounds, or drain sites healing without S/S of infection  Outcome: Progressing  Incisions, Wounds, or Drain Sites Healing Without Sign and Symptoms of Infection:   TWICE DAILY: Assess and document skin integrity   TWICE DAILY: Assess and document dressing/incision, wound bed, drain sites and surrounding tissue     Problem: ABCDS Injury Assessment  Goal: Absence of physical injury  Outcome: Progressing  Absence of Physical Injury: Implement safety measures based on patient assessment     Electronically signed by Kassandra Hayes RN on 2/6/2023 at 4:01 AM

## 2023-02-06 NOTE — PROGRESS NOTES
[facility]  Progress Note    2/6/2023 8:46 AM    Chief Complaint: AAA     POD #6 Open AAA Repair with Dr. Edelmira Trinidad    Subjective: Pt seen resting in bed in CVU, alert, oriented and calm, reports mild incisional pain with coughing or movement, otherwise, pain doing okay, asking to have a normal diet    Vital Signs:  Vitals:    02/06/23 0600 02/06/23 0700 02/06/23 0734 02/06/23 0736   BP: 132/73 121/68     Pulse: 59 64 67 66   Resp: 22 24 20 12   Temp:       TempSrc:       SpO2: 94% 94% 98% 98%   Weight:       Height:           I/O:    Intake/Output Summary (Last 24 hours) at 2/6/2023 0846  Last data filed at 2/6/2023 0532  Gross per 24 hour   Intake 2738.7 ml   Output 2480 ml   Net 258.7 ml       Physical Exam:   General: alert and oriented, no signs or symptoms of distress, afebrile  Chest/Lungs: non labored breathing no tachypnea, retractions or cyanosis  Cardiac: regular rate and rhythm  Abdomen: soft, distended, non-tender, bowel sounds active, JESSICA dressing to midline incision with mild strike-through drainage, abdominal binder in place  Extremities: normal strength, tone, and muscle mass, no deformities, no significant edema to BLE  Vascular: extremities warm and well perfused, no signs of cyanosis or ischemia, able to move BUE and BLE to command  Pulses:  -R DP: +2/4 palpable  -L DP: +2/4 palpable    Labs:   Lab Results   Component Value Date/Time     02/04/2023 08:52 AM    K 3.7 02/04/2023 08:52 AM     02/04/2023 08:52 AM    CO2 24 02/04/2023 08:52 AM    BUN 31 02/04/2023 08:52 AM    CREATININE 1.2 02/04/2023 08:52 AM    GFRAA >60 09/13/2022 11:50 AM    GFRAA >60 04/26/2013 09:53 AM    LABGLOM >60 02/04/2023 08:52 AM    GLUCOSE 103 02/04/2023 08:52 AM    GLUCOSE 113 08/01/2011 08:50 AM    MG 2.20 02/03/2023 04:04 AM    CALCIUM 8.3 02/04/2023 08:52 AM     Lab Results   Component Value Date/Time    WBC 9.6 02/04/2023 08:52 AM    RBC 2.64 02/04/2023 08:52 AM    HGB 7.8 02/04/2023 08:52 AM    HCT 24.2 02/04/2023 08:52 AM    MCV 91.5 02/04/2023 08:52 AM    RDW 16.0 02/04/2023 08:52 AM     02/04/2023 08:52 AM     Lab Results   Component Value Date    INR 1.47 (H) 02/01/2023    PROTIME 17.8 (H) 02/01/2023        Scheduled Meds:    mirtazapine  45 mg Oral Nightly    aspirin  81 mg Oral Daily    atorvastatin  10 mg Oral Daily    levothyroxine  88 mcg Oral Daily    LORazepam  1 mg Oral BID    ipratropium-albuterol  1 ampule Inhalation Q4H WA    mometasone-formoterol  2 puff Inhalation BID    escitalopram  20 mg Oral Daily    metoprolol tartrate  25 mg Oral BID    gabapentin  300 mg Oral TID    amLODIPine  10 mg Oral Daily    amiodarone  400 mg Oral BID    Followed by    Lena Potts ON 2/10/2023] amiodarone  200 mg Oral Daily    enoxaparin Sodium  30 mg SubCUTAneous Daily    famotidine (PEPCID) injection  10 mg IntraVENous BID    sodium chloride flush  5-40 mL IntraVENous 2 times per day    nicotine  1 patch TransDERmal Daily     Continuous Infusions:    sodium chloride Stopped (02/03/23 1510)    sodium chloride Stopped (02/02/23 1143)       Assessment:   -POD #6 Open AAA Repair with Dr. Fely Townsend remains in place to mid-line incision with small amount of drainage noted  -A-fib last week - now in NSR - Cardiology following - planning for 30 day event monitor after discharge  -Confusion significantly improved from last week    Plan:  -Resume home dose of psychotropic medications given pt is no longer requiring Precedex at the recommendation of Psychiatry  -PT/OT ordered  -Restart home medications  -Advance to regular diet - instructed pt and nursing to choose easily digestible foods and advance slowly  -PO PRN Pain medication added  -Continue to monitor progress, hopefully D/C home either today or tomorrow      All pertinent information and plan of care discussed with Dr. Cristopher Gil. All questions and concerns were addressed with the patient.  I have discussed the above stated plan with the patient and the nurse. The patient verbalized understanding and agreed with the plan.     Thank you for allowing to us to participate in the care of Sierra Maradiaga      Electronically signed by DEDRA Mendez CNP on 2/6/2023 at 8:46 AM

## 2023-02-06 NOTE — CARE COORDINATION
Chart Reviewed. Spoke wit bedside RN. Goal:   home with wife. Waiting on PT/OT evals this morning. Possible DC today/tomorrow.   Sandra Chi, [place]     Case Management   [phone]    2/6/2023  11:09 AM

## 2023-02-06 NOTE — PROGRESS NOTES
Bedside shift hand-off done w/ off-going HITESH SUNG Pt. was assisted from the recliner and back to bed. He was a minimal assist x 1 w/ a walker. He tolerated this well. He's alert and oriented (x4), calm, cooperative, SR on the monitor, on room air, and not in any distress. He has an intact mid-abdominal dressing w/ JESSICA NPWT (w/ flashing green OK indicator). Call light and bedside table are within his reach. Bed alarm is activated.     Electronically signed by Estrada Smart RN on 2/5/2023 at 8:00 PM

## 2023-02-07 VITALS
SYSTOLIC BLOOD PRESSURE: 158 MMHG | HEART RATE: 66 BPM | DIASTOLIC BLOOD PRESSURE: 80 MMHG | WEIGHT: 187.83 LBS | BODY MASS INDEX: 26.89 KG/M2 | RESPIRATION RATE: 20 BRPM | HEIGHT: 70 IN | OXYGEN SATURATION: 100 % | TEMPERATURE: 98.1 F

## 2023-02-07 PROCEDURE — 2580000003 HC RX 258: Performed by: STUDENT IN AN ORGANIZED HEALTH CARE EDUCATION/TRAINING PROGRAM

## 2023-02-07 PROCEDURE — 6370000000 HC RX 637 (ALT 250 FOR IP): Performed by: REGISTERED NURSE

## 2023-02-07 PROCEDURE — 6370000000 HC RX 637 (ALT 250 FOR IP): Performed by: NURSE PRACTITIONER

## 2023-02-07 PROCEDURE — 6370000000 HC RX 637 (ALT 250 FOR IP): Performed by: SURGERY

## 2023-02-07 PROCEDURE — 6360000002 HC RX W HCPCS: Performed by: STUDENT IN AN ORGANIZED HEALTH CARE EDUCATION/TRAINING PROGRAM

## 2023-02-07 PROCEDURE — 2500000003 HC RX 250 WO HCPCS: Performed by: STUDENT IN AN ORGANIZED HEALTH CARE EDUCATION/TRAINING PROGRAM

## 2023-02-07 PROCEDURE — APPNB30 APP NON BILLABLE TIME 0-30 MINS: Performed by: NURSE PRACTITIONER

## 2023-02-07 PROCEDURE — 6370000000 HC RX 637 (ALT 250 FOR IP): Performed by: INTERNAL MEDICINE

## 2023-02-07 PROCEDURE — 6370000000 HC RX 637 (ALT 250 FOR IP): Performed by: STUDENT IN AN ORGANIZED HEALTH CARE EDUCATION/TRAINING PROGRAM

## 2023-02-07 PROCEDURE — 94760 N-INVAS EAR/PLS OXIMETRY 1: CPT

## 2023-02-07 PROCEDURE — 94640 AIRWAY INHALATION TREATMENT: CPT

## 2023-02-07 RX ORDER — OXYCODONE HYDROCHLORIDE 5 MG/1
5 TABLET ORAL EVERY 6 HOURS PRN
Qty: 12 TABLET | Refills: 0 | Status: SHIPPED | OUTPATIENT
Start: 2023-02-07 | End: 2023-02-10 | Stop reason: ALTCHOICE

## 2023-02-07 RX ADMIN — ENOXAPARIN SODIUM 30 MG: 100 INJECTION SUBCUTANEOUS at 08:59

## 2023-02-07 RX ADMIN — Medication 10 MG: at 08:58

## 2023-02-07 RX ADMIN — AMIODARONE HYDROCHLORIDE 400 MG: 200 TABLET ORAL at 08:57

## 2023-02-07 RX ADMIN — GABAPENTIN 300 MG: 300 CAPSULE ORAL at 08:57

## 2023-02-07 RX ADMIN — ASPIRIN 81 MG 81 MG: 81 TABLET ORAL at 08:58

## 2023-02-07 RX ADMIN — ESCITALOPRAM OXALATE 20 MG: 10 TABLET, FILM COATED ORAL at 08:58

## 2023-02-07 RX ADMIN — LORAZEPAM 1 MG: 1 TABLET ORAL at 08:57

## 2023-02-07 RX ADMIN — IPRATROPIUM BROMIDE AND ALBUTEROL SULFATE 1 AMPULE: 2.5; .5 SOLUTION RESPIRATORY (INHALATION) at 08:19

## 2023-02-07 RX ADMIN — AMLODIPINE BESYLATE 10 MG: 10 TABLET ORAL at 08:58

## 2023-02-07 RX ADMIN — LEVOTHYROXINE SODIUM 88 MCG: 0.09 TABLET ORAL at 09:27

## 2023-02-07 RX ADMIN — METOPROLOL TARTRATE 25 MG: 25 TABLET, FILM COATED ORAL at 08:58

## 2023-02-07 RX ADMIN — MOMETASONE FUROATE AND FORMOTEROL FUMARATE DIHYDRATE 2 PUFF: 200; 5 AEROSOL RESPIRATORY (INHALATION) at 08:20

## 2023-02-07 ASSESSMENT — PAIN SCALES - GENERAL
PAINLEVEL_OUTOF10: 0
PAINLEVEL_OUTOF10: 0

## 2023-02-07 NOTE — PROGRESS NOTES
Discharge teaching completed with patient and his wife present. Educated patient about incision care, post op signs onf infection. And retail pharmacy provided medication. [facility] his home supply of medication that pt brought with him to hospital.    IV removed, Patient dressed himself. Pt and wife gathered his belongings. Patient and wife denied needs and all questions answered by RN. Discharge AVS supplied to patient. Pt accepted RN offer of wheelchair discharge. Wife pulled car around and RN assisted patient into car.     Electronically signed by Nikunj Alves RN on 2/7/2023 at 1:40 PM

## 2023-02-07 NOTE — DISCHARGE INSTR - DIET

## 2023-02-07 NOTE — PROGRESS NOTES
Dr. Brigid Epstein rounding. Removed JESSICA dressing to examine incision. Dr Brigid Epstein verbally expressed permission to leave dressing open to air and he instructed patient about incision care and follow up in office for staple removal in 1-2 weeks.

## 2023-02-07 NOTE — PLAN OF CARE
Problem: Discharge Planning  Goal: Discharge to home or other facility with appropriate resources  Outcome: Progressing     Problem: Pain  Goal: Verbalizes/displays adequate comfort level or baseline comfort level  Outcome: Progressing     Problem: Safety - Adult  Goal: Free from fall injury  Outcome: Progressing     Problem: Skin/Tissue Integrity  Goal: Absence of new skin breakdown  Description: 1. Monitor for areas of redness and/or skin breakdown  2. Assess vascular access sites hourly  3. Every 4-6 hours minimum:  Change oxygen saturation probe site  4. Every 4-6 hours:  If on nasal continuous positive airway pressure, respiratory therapy assess nares and determine need for appliance change or resting period. Outcome: Progressing     Problem: Safety - Medical Restraint  Goal: Remains free of injury from restraints (Restraint for Interference with Medical Device)  Description: INTERVENTIONS:  1. Determine that other, less restrictive measures have been tried or would not be effective before applying the restraint  2. Evaluate the patient's condition at the time of restraint application  3. Inform patient/family regarding the reason for restraint  4.  Q2H: Monitor safety, psychosocial status, comfort, nutrition and hydration  Outcome: Progressing     Problem: Neurosensory - Adult  Goal: Achieves stable or improved neurological status  Outcome: Progressing     Problem: Respiratory - Adult  Goal: Achieves optimal ventilation and oxygenation  Outcome: Progressing     Problem: Cardiovascular - Adult  Goal: Maintains optimal cardiac output and hemodynamic stability  Outcome: Progressing  Goal: Absence of cardiac dysrhythmias or at baseline  Outcome: Progressing     Problem: Skin/Tissue Integrity - Adult  Goal: Skin integrity remains intact  Outcome: Progressing  Goal: Incisions, wounds, or drain sites healing without S/S of infection  Outcome: Progressing     Problem: Genitourinary - Adult  Goal: Urinary catheter remains patent  Outcome: Progressing  Goal: Absence of urinary retention  Outcome: Progressing     Problem: Infection - Adult  Goal: Absence of infection at discharge  Outcome: Progressing  Goal: Absence of infection during hospitalization  Outcome: Progressing     Problem: Metabolic/Fluid and Electrolytes - Adult  Goal: Electrolytes maintained within normal limits  Outcome: Progressing     Problem: ABCDS Injury Assessment  Goal: Absence of physical injury  Outcome: Progressing     Problem: Confusion  Goal: Confusion, delirium, dementia, or psychosis is improved or at baseline  Description: INTERVENTIONS:  1. Assess for possible contributors to thought disturbance, including medications, impaired vision or hearing, underlying metabolic abnormalities, dehydration, psychiatric diagnoses, and notify attending LIP  2. [place] high risk fall precautions, as indicated  3. Provide frequent short contacts to provide reality reorientation, refocusing and direction  4. Decrease environmental stimuli, including noise as appropriate  5. Monitor and intervene to maintain adequate nutrition, hydration, elimination, sleep and activity  6. If unable to ensure safety without constant attention obtain sitter and review sitter guidelines with assigned personnel  7.  Initiate Psychosocial CNS and Spiritual Care consult, as indicated  Outcome: Progressing

## 2023-02-07 NOTE — DISCHARGE INSTR - COC
Continuity of Care Form    Patient Name: Miri Lambert   :  1945  MRN:  [id number]    Admit date:  2023  Discharge date:  ***    Code Status Order: Full Code   Advance Directives:   Advance Care Flowsheet Documentation       Date/Time Healthcare Directive Type of Healthcare Directive Copy in [address] Agent's Name Healthcare Agent's Phone Number    [phone] No, patient does not have an advance directive for healthcare treatment -- -- -- -- --            Admitting Physician:  Alta Buchanan DO  PCP: Bhaskar Cain MD    Discharging Nurse: [facility] Unit/Room#: M4R-8122/3230-66  Discharging Unit Phone Number: ***    Emergency Contact:   Extended Emergency Contact Information  Primary Emergency Contact: Livan Carolina  Address: [address]  Home Phone: [phone]  Mobile Phone: [phone]  Relation: Spouse  Secondary Emergency Contact: [address]  Mobile Phone: [phone]  Relation: Child    Past Surgical History:  Past Surgical History:   Procedure Laterality Date    ABDOMINAL AORTIC ANEURYSM REPAIR N/A 2023    DIRECT REPAIR OF INFRARENAL ABDOMINAL AORTIC ANEURYSM performed by Alta Buchanan DO at [facility]  N/A 2021    EXAM UNDER ANESTHESIA, BOTOX INJECTION ANAL FISSURE performed by Chandrika Jacome MD at [address]  2014    Dr Lesley Mcclain / [facility]  2015    neck cyst excision       Immunization History:   Immunization History   Administered Date(s) Administered    COVID-19, PFIZER Bivalent BOOSTER, DO NOT Dilute, (age 12y+), IM, 30 mcg/0.3 mL 10/13/2022    COVID-19, PFIZER GRAY top, DO NOT Dilute, (age 15 y+), IM, 30 mcg/0.3 mL 2022    COVID-19, PFIZER PURPLE top, DILUTE for use, (age 15 y+), 30mcg/0.3mL 2021, 2021, 2021    Influenza 10/16/2012, 10/24/2013    Influenza Virus Vaccine 10/28/2014, 2015    Influenza, FLUAD, (age 72 y+), Adjuvanted, 0.5mL 2021    Influenza, FLUARIX, FLULAVAL, FLUZONE (age 10 mo+) AND AFLURIA, (age 1 y+), PF, 0.5mL 2016    Influenza, High Dose (Fluzone 65 yrs and older) 2017, 2018    Influenza, Triv, inactivated, subunit, adjuvanted, IM (Fluad 65 yrs and older) 10/29/2019    Pneumococcal Conjugate 13-valent (Jsutyoj06) 10/28/2014    Pneumococcal Polysaccharide ([id number]) 2010    Tdap (Boostrix, Adacel) 2009    Zoster Live (Zostavax) 12/15/2008       Active Problems:  Patient Active Problem List   Diagnosis Code    HTN (hypertension) I10    Hypertrophy of prostate without urinary obstruction and other lower urinary tract symptoms (LUTS) N40.0    Hypercholesterolemia E78.00    Mood disorder ([facility] [address] F39    Renal cyst N28.1    BPH w/o urinary obs/LUTS N40.0    Pulmonary emphysema (HCC) J43.9    Hyperglycemia R73.9    Tobacco abuse Z72.0    Monoclonal paraproteinemia D47. 2    Abdominal aortic aneurysm (AAA) without rupture I71.40    Anal fissure K60.2    Preop examination Z01.818    Abdominal aortic aneurysm (AAA) greater than 5.5 cm in diameter in male I71.40    PAF (paroxysmal atrial fibrillation) (HCC) I48.0    Agitation R45.1    EILEEN (generalized anxiety disorder) F41.1       Isolation/Infection:   Isolation            No Isolation          Patient Infection Status       None to display            Nurse Assessment:  Last Vital Signs: BP (!) 158/80   Pulse 66   Temp 98.1 °F (36.7 °C) (Oral)   Resp 20   Ht 5' 9.5\" (1.765 m)   Wt 187 lb 13.3 oz (85.2 kg)   SpO2 100%   BMI 27.34 kg/m²     Last documented pain score (0-10 scale): Pain Level: 0  Last Weight:   Wt Readings from Last 1 Encounters:   23 187 lb 13.3 oz (85.2 kg)     Mental Status:  {IP PT MENTAL STATUS:}    IV Access:  {[facility] IV ACCESS:595583575}    Nursing Mobility/ADLs:  Walking   {[facility] PEI}  Transfer  {[facility] VSTL:058514822}  Bathing {[facility] DME ATQB:287828326}  Dressing  {[facility] DME ECLF:993118910}  Toileting  {[facility] D[facility] CTTA:645247920}  Feeding  {[facility] DME ZFMB:271781792}  Med Admin  {CH[facility] DME CKJE:822008971}  Med Delivery   { [facility] MED Delivery:251733831}    Wound Care Documentation and Therapy:  Negative Pressure Wound Therapy Abdomen Medial (Active)   Wound Type Surgical 02/06/23 2045   Unit Type JESSICA NPWT 02/06/23 2045   Dressing Type Other (Comment) 02/06/23 2045   Cycle Continuous 02/04/23 0800   Dressing Status Dry; Intact; Old drainage noted 02/06/23 2045   Drainage Amount Small 02/06/23 2045   Drainage Description Sanguinous 02/06/23 2045   Wound Assessment Other (Comment) 02/06/23 2045   Tete-wound Assessment Other (Comment) 02/06/23 2045   Odor None 02/06/23 2045   Number of days: 6       Incision 01/31/23 Abdomen Medial (Active)   Dressing Status Dry; Intact; Old drainage noted 02/06/23 2045   Incision Cleansed Not Cleansed 02/04/23 0800   Dressing/Treatment Negative pressure wound therapy 02/06/23 2045   Closure Other (Comment) 02/06/23 2045   Margins Other (Comment) 02/06/23 2045   Incision Assessment Other (Comment) 02/06/23 2045   Drainage Amount Small 02/06/23 2045   Drainage Description Sanguinous 02/06/23 2045   Odor None 02/06/23 2045   Tete-incision Assessment Other (Comment) 02/06/23 2045   Number of days: 7       Incision 05/13/21 Buttocks (Active)   Number of days: 634        Elimination:  Continence: Bowel: {YES / EI:38143}  Bladder: {YES / YT:35060}  Urinary Catheter: {Urinary Catheter:372855398}   Colostomy/Ileostomy/Ileal Conduit: {YES / :69531}       Date of Last BM: ***    Intake/Output Summary (Last 24 hours) at 2/7/2023 1107  Last data filed at 2/7/2023 0431  Gross per 24 hour   Intake 412 ml   Output 1645 ml   Net -1233 ml     I/O last 3 completed shifts: In: 1150.7 [P.O.:912;  I.V.:238.7]  Out: 3365 [Urine:3365]    Safety Concerns:     [address] Safety Concerns:635262444}    Impairments/Disabilities:      [address] Impairments/Disabilities:949681315}    Nutrition Therapy:  Current Nutrition Therapy:   [address] ARIELLE Diet List:650053305}    Routes of Feeding: {CHP DME Other Feedings:844780195}  Liquids: {Slp liquid thickness:08476}  Daily Fluid Restriction: {CHP DME Yes amt example:746367649}  Last Modified Barium Swallow with Video (Video Swallowing Test): {Done Not Done AUKU:628359868}    Treatments at the Time of Hospital Discharge:   Respiratory Treatments: ***  Oxygen Therapy:  {Therapy; copd oxygen:14484}  Ventilator:    { CC Vent DEHM:834438560}    Rehab Therapies: {THERAPEUTIC INTERVENTION:1916377107}  Weight Bearing Status/Restrictions: 50Marya BARAJAS Weight Bearin}  Other Medical Equipment (for information only, NOT a DME order):  {EQUIPMENT:082876871}  Other Treatments: ***    Patient's personal belongings (please select all that are sent with patient):  {[facility] DME Belongings:494122613}    RN SIGNATURE:  {Esignature:039188894}    CASE MANAGEMENT/SOCIAL WORK SECTION    Inpatient Status Date: ***    Readmission Risk Assessment Score:  Readmission Risk              Risk of Unplanned Readmission:  14           Discharging to Facility/ Agency   Name:   Address:  Phone:  Fax:    Dialysis Facility (if applicable)   Name:  Address:  Dialysis Schedule:  Phone:  Fax:    / signature: {Esignature:504889834}    PHYSICIAN SECTION    Prognosis: {Prognosis:0509429037}    Condition at Discharge: [address]Mary[address] Chow Patient Condition:492478440}    Rehab Potential (if transferring to Rehab): {Prognosis:7418336308}    Recommended Labs or Other Treatments After Discharge: ***    Physician Certification: I certify the above information and transfer of Kristyn Zamora  is necessary for the continuing treatment of the diagnosis listed and that he requires {Admit to Appropriate Level of Care:36134} for {GREATER/LESS:055066252} 30 days.      Update Admission H&P: {CHP DME Changes in XJF:788540351}    PHYSICIAN SIGNATURE: {[facility]:459858061}

## 2023-02-07 NOTE — PROGRESS NOTES
[facility]  Progress Note    2/7/2023 9:46 AM    Chief Complaint: AAA     POD #7 Open AAA Repair with Dr. Federico Madrigal    Subjective: Pt seen sitting on edge of bed in CVU, alert, oriented and calm, tolerating a regular diet, asking to D/C home today, no complaints    Vital Signs:  Vitals:    02/07/23 0600 02/07/23 0700 02/07/23 0819 02/07/23 0821   BP: 125/66 (!) 158/80     Pulse: 59 70 67 66   Resp:       Temp:       TempSrc:       SpO2: 95%  100% 100%   Weight:       Height:           I/O:    Intake/Output Summary (Last 24 hours) at 2/7/2023 0946  Last data filed at 2/7/2023 0431  Gross per 24 hour   Intake 412 ml   Output 1935 ml   Net -1523 ml       Physical Exam:   General: alert and oriented, no signs or symptoms of distress, afebrile  Chest/Lungs: non labored breathing no tachypnea, retractions or cyanosis  Cardiac: regular rate and rhythm  Abdomen: soft, mildly distended, non-tender, JESSICA dressing previously removed, incision intact with staples, no evidence of drainage or erythema noted  Extremities: normal strength, tone, and muscle mass, no deformities, no significant edema to BLE  Vascular: extremities warm and well perfused, no signs of cyanosis or ischemia, able to move BUE and BLE to command    Labs:   Lab Results   Component Value Date/Time     02/04/2023 08:52 AM    K 3.7 02/04/2023 08:52 AM     02/04/2023 08:52 AM    CO2 24 02/04/2023 08:52 AM    BUN 31 02/04/2023 08:52 AM    CREATININE 1.2 02/04/2023 08:52 AM    GFRAA >60 09/13/2022 11:50 AM    GFRAA >60 04/26/2013 09:53 AM    LABGLOM >60 02/04/2023 08:52 AM    GLUCOSE 103 02/04/2023 08:52 AM    GLUCOSE 113 08/01/2011 08:50 AM    MG 2.20 02/03/2023 04:04 AM    CALCIUM 8.3 02/04/2023 08:52 AM     Lab Results   Component Value Date/Time    WBC 9.6 02/04/2023 08:52 AM    RBC 2.64 02/04/2023 08:52 AM    HGB 7.8 02/04/2023 08:52 AM    HCT 24.2 02/04/2023 08:52 AM    MCV 91.5 02/04/2023 08:52 AM    RDW 16.0 02/04/2023 08:52 AM     02/04/2023 08:52 AM     Lab Results   Component Value Date    INR 1.47 (H) 02/01/2023    PROTIME 17.8 (H) 02/01/2023        Scheduled Meds:    mirtazapine  45 mg Oral Nightly    aspirin  81 mg Oral Daily    atorvastatin  10 mg Oral Nightly    Prostate  320 mg Oral Daily with breakfast    levothyroxine  88 mcg Oral Daily    LORazepam  1 mg Oral BID    ipratropium-albuterol  1 ampule Inhalation Q4H WA    mometasone-formoterol  2 puff Inhalation BID    escitalopram  20 mg Oral Daily    metoprolol tartrate  25 mg Oral BID    gabapentin  300 mg Oral TID    amLODIPine  10 mg Oral Daily    amiodarone  400 mg Oral BID    Followed by    Cecilia Lorenzo ON 2/10/2023] amiodarone  200 mg Oral Daily    enoxaparin Sodium  30 mg SubCUTAneous Daily    famotidine (PEPCID) injection  10 mg IntraVENous BID    sodium chloride flush  5-40 mL IntraVENous 2 times per day    nicotine  1 patch TransDERmal Daily     Continuous Infusions:    sodium chloride Stopped (02/03/23 1510)    sodium chloride Stopped (02/02/23 1143)       Assessment:   -POD #7 Open AAA Repair with Dr. Lisa Brandt  -Abdominal Incision intact with staples, no drainage or signs of infection  -A-fib last week - now in NSR  -Alert, oriented, calm  -Stable for D/C from PT/OT    Plan:  -Home dose of psychotropic medications resumed  -D/C home today  -F/U with Dr. Lisa Brandt in 2 weeks for post-op visit    -Pt has tolerated pain medications in combination with home medication regimen without complication while in hospital - discussed taking PRN pain medications at D/C as prescribed, with caution and to use the lowest dose possible and to use Tylenol as possible for post-op pain control - pt states understanding    All pertinent information and plan of care discussed with Dr. Kailee Shepherd. All questions and concerns were addressed with the patient. I have discussed the above stated plan with the patient and the nurse.  The patient verbalized understanding and agreed with the plan.     Thank you for allowing to us to participate in the care of Mayra Powell      Electronically signed by DEDRA Pelletier CNP on 2/7/2023 at 9:46 AM

## 2023-02-08 ENCOUNTER — CARE COORDINATION (OUTPATIENT)
Dept: CASE MANAGEMENT | Age: 78
End: 2023-02-08

## 2023-02-08 DIAGNOSIS — I71.40 ABDOMINAL AORTIC ANEURYSM (AAA) GREATER THAN 5.5 CM IN DIAMETER IN MALE: Primary | ICD-10-CM

## 2023-02-08 PROCEDURE — 1111F DSCHRG MED/CURRENT MED MERGE: CPT | Performed by: INTERNAL MEDICINE

## 2023-02-08 NOTE — CARE COORDINATION
[facility] Care Transitions Initial Follow Up Call    Call within 2 business days of discharge: Yes    Patient Current Location: [place] [address] Coordinator contacted the patient by telephone to perform post hospital discharge assessment. Verified name and  with patient as identifiers. Provided introduction to self, and explanation of the LPN Care Coordinator role. Patient: Miri Lambert Patient : 1945   MRN: [id number]  Reason for Admission: s/p AAA repair  Discharge Date: 23 RARS: Readmission Risk Score: 8.9      Last Discharge  Street       Date Complaint Diagnosis Description Type Department Provider    23  S/P AAA repair . .. Admission (Discharged) [address], DO            Was this an external facility discharge? No Discharge Facility: NA    Challenges to be reviewed by the provider   Additional needs identified to be addressed with provider: Yes  medications-requesting an inhaler               Method of communication with provider: none. LPN CC spoke with patient's wife, Major Pratt, HIPAA verified. States patient is resting. Has not smoked since his procedure. Pain is manageable. Incisions CDI. Has some cough that is productive. They are requesting an inhaler. LPN CC to forward request to PCP. States some SOB r/t cough. Denies CP, palpitations, N/V/D, fever/chills. Appetite good. Denies problems with bowels or bladder. States they've monitored BP and it is \"normal.\" MS at baseline. Full medication reconciliation completed. F/u noted below. Jamison Mcfadden LPN CC  Care Transitions  [phone]     LPN Care Coordinator reviewed discharge instructions, medical action plan, and red flags with patient who verbalized understanding. The patient was given an opportunity to ask questions and does not have any further questions or concerns at this time. Were discharge instructions available to patient? Yes.  Reviewed appropriate site of care based on symptoms and resources available to patient including: PCP  Specialist  Urgent care clinics  When to call 911. The patient agrees to contact the PCP office for questions related to their healthcare. Advance Care Planning:   Does patient have an Advance Directive: not on file. Medication reconciliation was performed with patient, who verbalizes understanding of administration of home medications. Medications reviewed, 1111F entered: yes    Was patient discharged with a pulse oximeter? no    Non-face-to-face services provided:  Obtained and reviewed discharge summary and/or continuity of care documents  Education of patient/family/caregiver/guardian to support self-management-BP, SPO2, f/u  Assessment and support for treatment adherence and medication management-full medication reconciliation and 1111f completed    Offered patient enrollment in the Remote Patient Monitoring (RPM) program for in-home monitoring: NA.    Care Transitions 24 Hour Call    Do you have a copy of your discharge instructions?: Yes  Do you have all of your prescriptions and are they filled?: Yes  Have you been contacted by a [facility] Pharmacist?: No  Have you scheduled your follow up appointment?: Yes  How are you going to get to your appointment?: Car - family or friend to transport  Do you feel like you have everything you need to keep you well at home?: Yes  Care Transitions Interventions         Discussed follow-up appointments. If no appointment was previously scheduled, appointment scheduling offered: Yes. Is follow up appointment scheduled within 7 days of discharge? Yes. Follow Up  Future Appointments   Date Time Provider Taylor Ignacio   2/23/2023  8:45 AM Dominique Guerrero DO [place] [facility]   3/3/2023  2:30 PM Anahi Rolle MD [facility]   3/15/2023 10:40 AM Sakina Fry MD [facility]       N Care Coordinator provided contact information.   Plan for follow-up call in 5-7 days based on severity of symptoms and risk factors. Plan for next call: symptom management-pain, cough  self management-incisions, BP, SPO2  follow-up appointment-post op 2/23  medication management-new or changed meds? Inhaler?     Wilton Isabel LPN

## 2023-02-08 NOTE — DISCHARGE SUMMARY
DISCHARGE SUMMARY      Patient ID:  Mackenzie Mallory  [id number] 89 y.o. 1945    Admission Date: 1/31/2023  5:52 AM  Discharge Date: 2/7/2023    Principle Diagnosis: Abdominal aortic aneurysm (AAA) greater than 5.5 cm in diameter in male  Secondary Diagnosis: Principal Problem:    Abdominal aortic aneurysm (AAA) greater than 5.5 cm in diameter in male  Active Problems:    PAF (paroxysmal atrial fibrillation) (HCC)    Agitation    EILEEN (generalized anxiety disorder)    Mood disorder ([facility] [address]  Resolved Problems:    * No resolved hospital problems.  *    Patient Active Problem List   Diagnosis    HTN (hypertension)    Hypertrophy of prostate without urinary obstruction and other lower urinary tract symptoms (LUTS)    Hypercholesterolemia    Mood disorder (HCC)    Renal cyst    BPH w/o urinary obs/LUTS    Pulmonary emphysema (HCC)    Hyperglycemia    Tobacco abuse    Monoclonal paraproteinemia    Abdominal aortic aneurysm (AAA) without rupture    Anal fissure    Preop examination    Abdominal aortic aneurysm (AAA) greater than 5.5 cm in diameter in male    PAF (paroxysmal atrial fibrillation) (HCC)    Agitation    EILEEN (generalized anxiety disorder)        Consults: IP CONSULT TO CARDIOLOGY  IP CONSULT TO PSYCHIATRY  IP CONSULT TO CRITICAL CARE    Procedure: Procedure(s):  DIRECT REPAIR OF INFRARENAL ABDOMINAL AORTIC ANEURYSM with Dr. Sienna Wagner on 1/31/2023    History: The patient is a 68 y.o. male with past medical and surgical history of:  Past Medical History:   Diagnosis Date    AAA (abdominal aortic aneurysm)     Anxiety     Depression     Hyperlipidemia     Hypertension     Hypothyroidism     Iliac artery stenosis, left ([facility] [address] 09/16/2021      and   Past Surgical History:   Procedure Laterality Date    ABDOMINAL AORTIC ANEURYSM REPAIR N/A 1/31/2023    DIRECT REPAIR OF INFRARENAL ABDOMINAL AORTIC ANEURYSM performed by Sienna Wagner DO at [facility]  N/A 05/13/2021    EXAM UNDER ANESTHESIA, BOTOX INJECTION ANAL FISSURE performed by Bronson Morales MD at [address]  09/12/2014    Dr Tri Hartman June 01/29/2015    neck cyst excision       Hospital Course: The patient underwent an Open Repair of AAA with Dr. Tram Page on 1/31/2023. His post-operative course progressed as expected from a surgical standpoint. The patient went into A-fib post-operatively for which Cardiology was consulted - he was started on Amiodarone and returned to [facility]. He did experience agitation and delirium post-operatively for which Psychiatry and Critical Care were consulted for. Psychiatry reviewed and adjusted the patients psychotropic medications. The patient recovered from this episode and returned to his baseline mental status. His JESSICA dressing was remved on the day of discharge (POD #7) and his incision appeared well approximated with staples and without any significant drainage. His pain was controlled with combination of oral and IV medications. He was able to ambulate without difficulty and tolerated a regular diet. The patient was discharged on 2/7/2023. Disposition: home in stable condition    Patient Instructions:     Medication List        START taking these medications      amiodarone 200 MG tablet  Commonly known as: CORDARONE  Take 2 tabs by mouth twice a day for 4 days and then 1 tab by mouth daily. oxyCODONE 5 MG immediate release tablet  Commonly known as: ROXICODONE  Take 1 tablet by mouth every 6 hours as needed for Pain for up to 3 days.  Max Daily Amount: 20 mg            CONTINUE taking these medications      amLODIPine 10 MG tablet  Commonly known as: NORVASC  TAKE 1 TABLET EVERY DAY     aspirin 81 MG tablet     Chelated Zinc 50 MG Tabs     CoQ-10 100 MG Caps     escitalopram 10 MG tablet  Commonly known as: LEXAPRO     gabapentin 300 MG capsule  Commonly known as: NEURONTIN     levothyroxine 88 MCG tablet  Commonly known as: SYNTHROID  TAKE 1 TABLET EVERY DAY     lisinopril 30 MG tablet  Commonly known as: PRINIVIL;ZESTRIL  TAKE 1 TABLET EVERY DAY     LORazepam 1 MG tablet  Commonly known as: ATIVAN     Melatonin 5 MG Caps     metoprolol tartrate 25 MG tablet  Commonly known as: LOPRESSOR  TAKE 1 TABLET DAILY     mirtazapine 45 MG tablet  Commonly known as: REMERON     multivitamin Tabs tablet     PROSTATE THERAPY COMPLEX PO     simvastatin 20 MG tablet  Commonly known as: ZOCOR  TAKE 1 TABLET AT BEDTIME     VITAMIN D3 PO               Where to Get Your Medications        These medications were sent to [address] [place] [address], [address] [place] [address] & [facility]  [address] [address]      Phone: [phone]   amiodarone 200 MG tablet  oxyCODONE 5 MG immediate release tablet         Activity: No lifting, driving, or strenuous exercise until released by Dr. Ceasar Caldwell to do so. Diet: Resume diet prior to admission  Wound Care: Keep wound clean and dry. Use soap and water to clean around your wound, keep open to air. Vascular Surgery Discharge Medications  Antiplatelet - ASA 81 mg  Statin - Simvastatin 20 mg    Follow up with Dr. Ceasar Caldwell in 2 weeks. Please call Dr. Nasreen Bowen office at [phone] to make an appointment. Follow up with Cardiology in 2 weeks for A-fib and for 30 day event monitor set-up as planned by Cardiology while hospitalized.     Signed:  Electronically signed by DEDRA Kearns CNP on 2/8/2023 at 7:34 AM

## 2023-02-09 PROBLEM — Z01.818 PREOP EXAMINATION: Status: RESOLVED | Noted: 2023-01-10 | Resolved: 2023-02-09

## 2023-02-10 ENCOUNTER — TELEPHONE (OUTPATIENT)
Dept: VASCULAR SURGERY | Age: 78
End: 2023-02-10

## 2023-02-10 DIAGNOSIS — G89.18 POST-OP PAIN: Primary | ICD-10-CM

## 2023-02-10 PROBLEM — D69.6 THROMBOCYTOPENIA, UNSPECIFIED (HCC): Status: ACTIVE | Noted: 2023-02-10

## 2023-02-10 RX ORDER — OXYCODONE HYDROCHLORIDE 5 MG/1
5 TABLET ORAL EVERY 6 HOURS PRN
Qty: 28 TABLET | Refills: 0 | Status: SHIPPED | OUTPATIENT
Start: 2023-02-11 | End: 2023-02-18

## 2023-02-10 NOTE — TELEPHONE ENCOUNTER
Pt called to report he still has left side pain about an inch and a half from the sutures. He only has 2 oxycodone left and requests a refill to [facility] in [place].

## 2023-02-15 ENCOUNTER — CARE COORDINATION (OUTPATIENT)
Dept: CASE MANAGEMENT | Age: 78
End: 2023-02-15

## 2023-02-15 NOTE — CARE COORDINATION
[address] Care Transitions Follow Up Call    Patient: Madhu Alegre  Patient : 1945   MRN: [id number]  Reason for Admission: s/p AAA  Discharge Date: 23 RARS: Readmission Risk Score: 8.9    First initial 24 hr follow up outreach call attempt, no answer. CTN left  with contact information and request for return call. CTN will continue with outreach call attempts.        Care Transitions Subsequent and Final Call    Subsequent and Final Calls  Care Transitions Interventions  Other Interventions:           SHANTELL Matos, RN   Care Transition Nurse  Mobile: [phone]

## 2023-02-22 ENCOUNTER — CARE COORDINATION (OUTPATIENT)
Dept: CASE MANAGEMENT | Age: 78
End: 2023-02-22

## 2023-02-22 NOTE — CARE COORDINATION
[facility] Care Transitions Follow Up Call    Patient Current Location:  Home: [facility] [address] [place] [address] Care Coordinator contacted the patient by telephone to follow up after admission on 2023. Verified name and  with patient as identifiers. Patient: Herminia Forde  Patient : 1945   MRN: [id number]  Reason for Admission: s/p AAA  Discharge Date: 23 RARS: Readmission Risk Score: 8.9      Needs to be reviewed by the provider   Additional needs identified to be addressed with provider: No  none             Method of communication with provider: none. Spoke with patient he reports he is healing slowly. He will see his surgeon tomorrow for staple removal. Eating and drinking well. No urinary problems. Has had diarrhea, but stool was better today. He denies redness, tenderness or pain to incision. No pain, cough, sob, dizziness, cp, palpitations or confusion noted. No needs or concerns at this time. Will continue to follow. Addressed changes since last contact:  none  Discussed follow-up appointments. If no appointment was previously scheduled, appointment scheduling offered: Yes. Is follow up appointment scheduled within 7 days of discharge? No.    Follow Up  Future Appointments   Date Time Provider Taylor Ignacio   2023  8:45 AM Phoebe Harman DO [facility]   3/3/2023  2:30 PM Brenden Massey MD [facility]   3/15/2023 10:45 AM Eleno Sprague MD [facility] [place] [facility]     Non-[facility] follow up appointment(s):       N Care Coordinator reviewed medical action plan and red flags with patient and discussed any barriers to care and/or understanding of plan of care after discharge. Discussed appropriate site of care based on symptoms and resources available to patient including: PCP  Specialist. The patient agrees to contact the PCP office for questions related to their healthcare. Advance Care Planning:   reviewed and current.      Patients top risk factors for readmission: depression, functional physical ability, and ineffective coping, medical condition recent surgery, copd, htn,afib  Interventions to address risk factors: Education of patient/family/caregiver/guardian to support self-management-     Offered patient enrollment in the Remote Patient Monitoring (RPM) program for in-home monitoring: NA.     Care Transitions Subsequent and Final Call    Subsequent and Final Calls  Do you have any ongoing symptoms?: No  Have your medications changed?: No  Do you have any questions related to your medications?: No  Do you currently have any active services?: No  Do you have any needs or concerns that I can assist you with?: No  Identified Barriers: None  Care Transitions Interventions  Other Interventions:             LPN Care Coordinator provided contact information for future needs. Plan for follow-up call in 5-7 days based on severity of symptoms and risk factors.   Plan for next call: symptom management-   self management-     Angy Damon LPN

## 2023-02-23 ENCOUNTER — OFFICE VISIT (OUTPATIENT)
Dept: VASCULAR SURGERY | Age: 78
End: 2023-02-23

## 2023-02-23 DIAGNOSIS — Z09 POSTOP CHECK: Primary | ICD-10-CM

## 2023-02-23 PROCEDURE — 99024 POSTOP FOLLOW-UP VISIT: CPT | Performed by: STUDENT IN AN ORGANIZED HEALTH CARE EDUCATION/TRAINING PROGRAM

## 2023-02-23 NOTE — PROGRESS NOTES
Valerie Harris (:  1945) is a 68 y.o. male,Established patient, here for evaluation of the following chief complaint(s):  Post-Op Check         ASSESSMENT/PLAN:  1. Postop check    This is a 68year old male patient s/p open AAA repair. Overall he is convalescing as expected. Will remove staples today. Plan for another office visit in 4 weeks. All questions answered. Subjective   SUBJECTIVE/OBJECTIVE:  This is a 68year old male patient who is s/p open AAA repair. Overall he is doing well. He is eating, tolerated diet, having normal bowel function. He has no further pain. No issues with leg swelling or claudication. No back pain. His energy levels are improving daily. Objective   Physical Exam  Constitutional:       Appearance: Normal appearance. Cardiovascular:      Rate and Rhythm: Normal rate and regular rhythm. Pulses: Normal pulses. Pulmonary:      Effort: Pulmonary effort is normal.   Abdominal:      Palpations: Abdomen is soft. Comments: Midline incision well healed   Musculoskeletal:      Right lower leg: No edema. Left lower leg: No edema. Skin:     General: Skin is warm and dry. Neurological:      Mental Status: He is alert.             Nettie Joe DO, FACS, FSVS, [address] [facility] Endovascular [facility]

## 2023-03-01 ENCOUNTER — CARE COORDINATION (OUTPATIENT)
Dept: CASE MANAGEMENT | Age: 78
End: 2023-03-01

## 2023-03-03 ENCOUNTER — CARE COORDINATION (OUTPATIENT)
Dept: CASE MANAGEMENT | Age: 78
End: 2023-03-03

## 2023-03-03 NOTE — CARE COORDINATION
[facility] Care Transitions Follow Up Call    Patient: Timmy Bowden  Patient : 1945   MRN: [id number]  Reason for Admission: s/p AAA repair  Discharge Date: 23 RARS: Readmission Risk Score: 8.9      Second and final outreach call attempt, no answer.  CTN left  with contact information and request for return call.  CTN will resolve episode and remain available.    Follow Up  Future Appointments   Date Time Provider Department Center   3/15/2023 10:45 AM Ayden Richard MD [facility] [place] [facility]   3/23/2023  1:00 PM Jayant Horn DO [facility]     SHANTELL Paris, RN   Care Transition Nurse  Mobile: [phone]

## 2023-03-23 ENCOUNTER — OFFICE VISIT (OUTPATIENT)
Dept: VASCULAR SURGERY | Age: 78
End: 2023-03-23

## 2023-03-23 VITALS — BODY MASS INDEX: 27.91 KG/M2 | HEIGHT: 69 IN

## 2023-03-23 DIAGNOSIS — Z09 POSTOP CHECK: Primary | ICD-10-CM

## 2023-03-23 PROCEDURE — 99024 POSTOP FOLLOW-UP VISIT: CPT | Performed by: STUDENT IN AN ORGANIZED HEALTH CARE EDUCATION/TRAINING PROGRAM

## 2023-03-23 NOTE — PROGRESS NOTES
Bindu Meraz (:  1945) is a 68 y.o. male,Established patient, here for evaluation of the following chief complaint(s):  Post-Op Check         ASSESSMENT/PLAN:  1. Postop check    This is a 68year old male patient s/p open AAA. Recommend 1 year follow up. Will need non contrast CT scan chest/abdomen/pelvis within 5 years per SVS guidelines after open repair. All questions answered. Subjective   SUBJECTIVE/OBJECTIVE:  This is a 68year old male patient who presents 2 months after open AAA repair. No acute complaints. Abdominal wound has healed. No back or abdominal pain. Has \"pudendal nerve pain\" which is a prior complaint. Present with wife and asking if RFA of the nerve is acceptable to do at this time. Objective   Physical Exam  Constitutional:       Appearance: Normal appearance. Cardiovascular:      Rate and Rhythm: Normal rate and regular rhythm. Pulses: Normal pulses. Pulmonary:      Effort: Pulmonary effort is normal.   Abdominal:      Palpations: Abdomen is soft. Comments: Midline incision well healed   Musculoskeletal:      Right lower leg: No edema. Left lower leg: No edema. Skin:     General: Skin is warm and dry. Neurological:      Mental Status: He is alert.      Malik Marsh, DO, FACS, FSVS, [address] [facility] Vascular and Endovascular Surgery

## 2023-03-27 ENCOUNTER — HOSPITAL ENCOUNTER (OUTPATIENT)
Dept: PULMONOLOGY | Age: 78
Discharge: HOME | End: 2023-03-27
Payer: MEDICARE

## 2023-03-27 DIAGNOSIS — J44.9 CHRONIC OBSTRUCTIVE PULMONARY DISEASE, UNSPECIFIED COPD TYPE (HCC): ICD-10-CM

## 2023-03-27 LAB
DLCO %PRED: 87 %
DLCO PRED: NORMAL
DLCO/VA %PRED: NORMAL
DLCO/VA PRED: NORMAL
DLCO/VA: NORMAL
DLCO: NORMAL
EXPIRATORY TIME-POST: NORMAL
EXPIRATORY TIME: NORMAL
FEF 25-75% %CHNG: NORMAL
FEF 25-75% %PRED-POST: NORMAL
FEF 25-75% %PRED-PRE: NORMAL
FEF 25-75% PRED: NORMAL
FEF 25-75%-POST: NORMAL
FEF 25-75%-PRE: NORMAL
FEV1 %PRED-POST: 85 %
FEV1 %PRED-PRE: 89 %
FEV1 PRED: NORMAL
FEV1-POST: NORMAL
FEV1-PRE: NORMAL
FEV1/FVC %PRED-POST: NORMAL
FEV1/FVC %PRED-PRE: NORMAL
FEV1/FVC PRED: NORMAL
FEV1/FVC-POST: 68 %
FEV1/FVC-PRE: 64 %
FVC %PRED-POST: NORMAL
FVC %PRED-PRE: NORMAL
FVC PRED: NORMAL
FVC-POST: NORMAL
FVC-PRE: NORMAL
GAW %PRED: NORMAL
GAW PRED: NORMAL
GAW: NORMAL
IC %PRED: NORMAL
IC PRED: NORMAL
IC: NORMAL
MEP: NORMAL
MIP: NORMAL
MVV %PRED-PRE: NORMAL
MVV PRED: NORMAL
MVV-PRE: NORMAL
PEF %PRED-POST: NORMAL
PEF %PRED-PRE: NORMAL
PEF PRED: NORMAL
PEF%CHNG: NORMAL
PEF-POST: NORMAL
PEF-PRE: NORMAL
RAW %PRED: NORMAL
RAW PRED: NORMAL
RAW: NORMAL
RV %PRED: NORMAL
RV PRED: NORMAL
RV: NORMAL
SVC %PRED: NORMAL
SVC PRED: NORMAL
SVC: NORMAL
TLC %PRED: 105 %
TLC PRED: NORMAL
TLC: NORMAL
VA %PRED: NORMAL
VA PRED: NORMAL
VA: NORMAL
VTG %PRED: NORMAL
VTG PRED: NORMAL
VTG: NORMAL

## 2023-03-27 PROCEDURE — 94664 DEMO&/EVAL PT USE INHALER: CPT

## 2023-03-27 PROCEDURE — 94726 PLETHYSMOGRAPHY LUNG VOLUMES: CPT

## 2023-03-27 PROCEDURE — 94640 AIRWAY INHALATION TREATMENT: CPT

## 2023-03-27 PROCEDURE — 6370000000 HC RX 637 (ALT 250 FOR IP): Performed by: STUDENT IN AN ORGANIZED HEALTH CARE EDUCATION/TRAINING PROGRAM

## 2023-03-27 PROCEDURE — 94729 DIFFUSING CAPACITY: CPT

## 2023-03-27 PROCEDURE — 94060 EVALUATION OF WHEEZING: CPT

## 2023-03-27 RX ORDER — ALBUTEROL SULFATE 90 UG/1
4 AEROSOL, METERED RESPIRATORY (INHALATION) ONCE
Status: COMPLETED | OUTPATIENT
Start: 2023-03-27 | End: 2023-03-27

## 2023-03-27 RX ADMIN — ALBUTEROL SULFATE 4 PUFF: 90 AEROSOL, METERED RESPIRATORY (INHALATION) at 13:20

## 2023-03-27 ASSESSMENT — PULMONARY FUNCTION TESTS
FEV1/FVC_POST: 68
FEV1_PERCENT_PREDICTED_PRE: 89
FEV1_PERCENT_PREDICTED_POST: 85
FEV1/FVC_PRE: 64

## 2023-03-29 PROCEDURE — 94726 PLETHYSMOGRAPHY LUNG VOLUMES: CPT | Performed by: INTERNAL MEDICINE

## 2023-03-29 PROCEDURE — 94729 DIFFUSING CAPACITY: CPT | Performed by: INTERNAL MEDICINE

## 2023-03-29 PROCEDURE — 94060 EVALUATION OF WHEEZING: CPT | Performed by: INTERNAL MEDICINE

## 2023-03-29 NOTE — PROCEDURES
Spirometry was acceptable and reproducible by ATS standards      Spirometry/Flow volume loop:    Spirometry and flow volume loops consistent with mild airflow obstruction. FEV1 of 85%, and FVC 93%. There was not a significant bronchodilator response. Lung volumes:  Lung volumes within normal limits. No evidence of air trapping or hyperinflation. Diffusing capacity:  Diffusion capacity within normal limits    Summary:  Mild airflow obstruction consistent with mild COPD. FEV1 %Pred-Post   Date Value Ref Range Status   03/27/2023 85 % Final     FEV1/FVC-Post   Date Value Ref Range Status   03/27/2023 68 % Final     TLC %Pred   Date Value Ref Range Status   03/27/2023 105 % Final       PFT data will be scanned into the media tab under this encounter. Please see the scanned data for numerical values.      Rosio Dominguez MD  [facility] Pul[facility], Sleep and Critical Care Medicine

## 2024-06-19 NOTE — PROGRESS NOTES
-- DO NOT REPLY / DO NOT REPLY ALL --  -- This inbox is not monitored  -- Message is from Engagement Center Operations (ECO) --    General Patient Message: Representative called for status of paperwork. Representative states pt is receiving home health services from this agency. They need the form filled out and last face to face visit information pt had with their pcp.  (Visit notes). Pt was originally was referred to home health by orthopedic surgeon.  Caller Information         Type Contact Phone/Fax    06/17/2024 01:52 PM CDT Phone (Incoming) [employer] [phone]     [facility]    06/19/2024 10:05 AM CDT Phone (Incoming) [employer] [phone]     [facility]            Alternative phone number: None    Can a detailed message be left? Yes - Voicemail      Patient has been advised the message will be addressed within 2-3 business days.             PACU Transfer to [facility]    Vitals:    05/13/21 1315   BP: 123/75   Pulse: 54   Resp: 14   Temp: 97.2 °F (36.2 °C)   SpO2: 98%         Intake/Output Summary (Last 24 hours) at 5/13/2021 1324  Last data filed at 5/13/2021 1304  Gross per 24 hour   Intake 975 ml   Output 0 ml   Net 975 ml       Pain assessment:  none  Pain Level: 0    Patient transferred to care of [facility] RN. Transferred with all belongings to [facility] #16. Patient is aware he needs to void prior to discharge. Sent with soda for sipping and urinal in hand. Call placed to wife for update and to make aware of plan to move to discharge area.  Patient states he already has sitz bath at home form use.    5/13/2021 1:24 PM

## (undated) DEVICE — APPLIER LIG CLP M L11IN TI STR RNG HNDL FOR 20 CLP DISP

## (undated) DEVICE — INTENDED FOR TISSUE SEPARATION, AND OTHER PROCEDURES THAT REQUIRE A SHARP SURGICAL BLADE TO PUNCTURE OR CUT.: Brand: BARD-PARKER ® STAINLESS STEEL BLADES

## (undated) DEVICE — SUTURE PERMA-HAND SZ 2-0 L30IN NONABSORBABLE BLK L26MM SH K833H

## (undated) DEVICE — DRAPE WARMER SLUSH 66X44IN

## (undated) DEVICE — SUTURE PERMAHAND SZ 2-0 L18IN NONABSORBABLE BLK L26MM SH C012D

## (undated) DEVICE — POSITIONER HD REST FOAM CMFRT TCH

## (undated) DEVICE — PREVENA PEEL & PLACE SYSTEM KIT- 13 CM: Brand: PREVENA™ PEEL & PLACE™

## (undated) DEVICE — UNDERPANTS INCONT XL 45-70IN KNIT SEAMLESS DSGN COLOR-CODED

## (undated) DEVICE — TAPE MED W1/8XL30IN WHT POLY

## (undated) DEVICE — AGENT HEMSTAT 3GM OXIDIZED REGENERATED CELOS ABSRB FOR CONT (ORDER MULTIPLES OF 5EA)

## (undated) DEVICE — ST FLUFF LG 1 PLY: Brand: DEROYAL

## (undated) DEVICE — GLUE TISS 10ML PLAS STD SPRD TIP SYR PLUNG PREFIL SKIN CLSR 5PK

## (undated) DEVICE — PLATE ES AD W 9FT CRD 2

## (undated) DEVICE — NEEDLE PROC L2.75IN OD18GA WNG SGL WALL PUNC GUID WIRE FOR

## (undated) DEVICE — 4-PORT MANIFOLD: Brand: NEPTUNE 2

## (undated) DEVICE — TAPE UMB 0.25X30 IN BRAIDED POLYESTER STRL

## (undated) DEVICE — COVER,TABLE,77X90,STERILE: Brand: MEDLINE

## (undated) DEVICE — TUBING, SUCTION, 1/4" X 12', STRAIGHT: Brand: MEDLINE

## (undated) DEVICE — SOLUTION IV 1000ML 0.9% SOD CHL PH 5 INJ USP VIAFLX PLAS

## (undated) DEVICE — 450 ML BOTTLE OF 0.05% CHLORHEXIDINE GLUCONATE IN 99.95% STERILE WATER FOR IRRIGATION, USP AND APPLICATOR.: Brand: IRRISEPT ANTIMICROBIAL WOUND LAVAGE

## (undated) DEVICE — GARMENT,MEDLINE,DVT,INT,CALF,MED, GEN2: Brand: MEDLINE

## (undated) DEVICE — SURE SET-DOUBLE BASIN-LF: Brand: MEDLINE INDUSTRIES, INC.

## (undated) DEVICE — FLOSEAL HEMOSTATIC MATRIX, 10ML: Brand: FLOSEAL HEMOSTATIC MATRIX

## (undated) DEVICE — TOTAL TRAY, 16FR 10ML SIL FOLEY, URN: Brand: MEDLINE

## (undated) DEVICE — LOOP,VESSEL,MAXI,BLUE,2/PK,STERILE: Brand: MEDLINE

## (undated) DEVICE — COUNTER NDL 40 COUNT HLD 70 NUM FOAM BLK SGL MAG W BLDE REMV

## (undated) DEVICE — AGENT HEMSTAT W4XL4IN OXIDIZED REGENERATED CELOS ABSRB SFT

## (undated) DEVICE — SUTURE PDS II SZ 0 L60IN ABSRB VLT L48MM CTX 1/2 CIR Z990G

## (undated) DEVICE — STANDARD HYPODERMIC NEEDLE,POLYPROPYLENE HUB: Brand: MONOJECT

## (undated) DEVICE — PICO 7 10CM X 40CM: Brand: PICO™ 7

## (undated) DEVICE — SYRINGE MED 30ML STD CLR PLAS LUERLOCK TIP N CTRL DISP

## (undated) DEVICE — TRAY PREP DRY W/ PREM GLV 2 APPL 6 SPNG 2 UNDPD 1 OVERWRAP

## (undated) DEVICE — PACK DRP UNIV W BK TBL MAYO STD BTM TOP SIDE UTIL CVR ZONE

## (undated) DEVICE — FELT SURG W6XL6IN THK1.65MM PTFE FOR THE REP OF SEPT DEFCT

## (undated) DEVICE — APPLIER CLP L L13IN TI MULT RNG HNDL 20 CLP STR LIGACLP

## (undated) DEVICE — APPLIER CLP L9.375IN APER 2.1MM CLS L3.8MM 20 SM TI CLP

## (undated) DEVICE — LOOP VES W1.3MM THK0.9MM MINI YEL SIL FLD REPELLENT

## (undated) DEVICE — APPLIER CLP L9.38IN M LIG TI DISP STR RNG HNDL LIGACLP

## (undated) DEVICE — PENCIL SMK EVAC 10 FT BLADE ELECTRD ROCKER FOR TELSCP

## (undated) DEVICE — SUTURE PROL SZ 4-0 L36IN NONABSORBABLE BLU L17MM RB-1 1/2 M8557

## (undated) DEVICE — JEWISH HOSPITAL TURNOVER KIT: Brand: MEDLINE INDUSTRIES, INC.

## (undated) DEVICE — GLOVE ORTHO 7 1/2   MSG9475

## (undated) DEVICE — STAPLER SKIN H3.9MM WIRE DIA0.58MM CRWN 6.9MM 35 STPL FIX

## (undated) DEVICE — SUTURE VCRL + SZ 0 L27IN ABSRB UD L36MM CT-1 1/2 CIR VCPP41D

## (undated) DEVICE — SHEET,T,THYROID,STERILE: Brand: MEDLINE

## (undated) DEVICE — DRAPE,INSTRUMENT,MAGNETIC,10X16: Brand: MEDLINE

## (undated) DEVICE — SYRINGE 60ML BULB IRRIG ST LF

## (undated) DEVICE — SPONGE LAP W18XL18IN WHT COT 4 PLY FLD STRUNG RADPQ DISP ST

## (undated) DEVICE — SUTURE PROL SZ 3-0 L48IN NONABSORBABLE BLU SH L26MM 1/2 CIR 8534H

## (undated) DEVICE — SOLUTION IV IRRIG POUR BRL 0.9% SODIUM CHL 2F7124

## (undated) DEVICE — SUTURE NONABSORBABLE MONOFILAMENT 5-0 C-1 4X36 IN PROLENE M8720

## (undated) DEVICE — SYRINGE TB 1ML NDL 27GA L0.5IN PLAS W/ SFTY LOK PERM NDL

## (undated) DEVICE — FOGARTY - HYDRAGRIP SURGICAL - CLAMP INSERTS: Brand: FOGARTY SOFTJAW

## (undated) DEVICE — TOWEL,OR,DSP,ST,BLUE,STD,4/PK,20PK/CS: Brand: MEDLINE

## (undated) DEVICE — LOOP VES W25MM THK1MM MAXI RED SIL FLD REPELLENT 100 PER

## (undated) DEVICE — ELECTRODE BLDE L6.5IN CAUT EXT DISP

## (undated) DEVICE — 3M™ IOBAN™ 2 ANTIMICROBIAL INCISE DRAPE 6650EZ: Brand: IOBAN™ 2

## (undated) DEVICE — SYRINGE MED 10ML LUERLOCK TIP W/O SFTY DISP

## (undated) DEVICE — SUPPLIED AS A PAIR FOR USE IN JAWS OF RESUABLE CLAMPS.: Brand: INTRACK® INSERTS

## (undated) DEVICE — GLOVE SURG SZ 7 CRM LTX FREE POLYISOPRENE POLYMER BEAD ANTI

## (undated) DEVICE — PAD,ABDOMINAL,5"X9",ST,LF,25/BX: Brand: MEDLINE INDUSTRIES, INC.

## (undated) DEVICE — PAD,NON-ADHERENT,3X8,STERILE,LF,1/PK: Brand: MEDLINE

## (undated) DEVICE — STAPLER SKIN H3.9MM WIRE DIA0.58MM CRWN 6.9MM 35 STPL ROT

## (undated) DEVICE — SURGICAL SET UP - SURE SET: Brand: MEDLINE INDUSTRIES, INC.

## (undated) DEVICE — MAJOR VASCULAR: Brand: MEDLINE INDUSTRIES, INC.

## (undated) DEVICE — TAPE UMBILICAL W1/16XL30IN COTTON ROUND NONRADIOPAQUE

## (undated) DEVICE — SUTURE VCRL + SZ 2-0 L27IN ABSRB CLR CT-1 1/2 CIR TAPERCUT VCP259H

## (undated) DEVICE — SUTURE SURG O PROLENE 1X36IN CT-1 DBL ARM BL D7717

## (undated) DEVICE — RETAINER VISCERA GLASSMAN FISH DISP ST

## (undated) DEVICE — SPONGE GZ W4XL4IN COT 12 PLY TYP VII WVN C FLD DSGN STERILE